# Patient Record
Sex: FEMALE | Race: WHITE | Employment: UNEMPLOYED | URBAN - METROPOLITAN AREA
[De-identification: names, ages, dates, MRNs, and addresses within clinical notes are randomized per-mention and may not be internally consistent; named-entity substitution may affect disease eponyms.]

---

## 2018-06-04 ENCOUNTER — OFFICE VISIT (OUTPATIENT)
Dept: ORTHOPEDIC SURGERY | Age: 53
End: 2018-06-04
Payer: COMMERCIAL

## 2018-06-04 VITALS — BODY MASS INDEX: 44.39 KG/M2 | HEIGHT: 64 IN | WEIGHT: 260 LBS

## 2018-06-04 DIAGNOSIS — M17.12 PRIMARY OSTEOARTHRITIS OF LEFT KNEE: Primary | ICD-10-CM

## 2018-06-04 DIAGNOSIS — M77.50 TENDONITIS OF ANKLE: ICD-10-CM

## 2018-06-04 PROCEDURE — 99204 OFFICE O/P NEW MOD 45 MIN: CPT | Performed by: ORTHOPAEDIC SURGERY

## 2018-06-04 RX ORDER — METHYLPREDNISOLONE 4 MG/1
4 TABLET ORAL SEE ADMIN INSTRUCTIONS
Qty: 1 KIT | Refills: 0 | Status: SHIPPED | OUTPATIENT
Start: 2018-06-04 | End: 2018-06-10

## 2018-06-04 RX ORDER — ATENOLOL AND CHLORTHALIDONE TABLET 50; 25 MG/1; MG/1
1 TABLET ORAL NIGHTLY
COMMUNITY
Start: 2018-05-16 | End: 2022-01-07

## 2018-06-04 RX ORDER — MELOXICAM 15 MG/1
15 TABLET ORAL DAILY
Qty: 30 TABLET | Refills: 3 | Status: SHIPPED | OUTPATIENT
Start: 2018-06-04 | End: 2019-01-25 | Stop reason: SDUPTHER

## 2018-06-04 RX ORDER — CITALOPRAM 40 MG/1
40 TABLET ORAL NIGHTLY
COMMUNITY

## 2018-06-04 RX ORDER — POTASSIUM CHLORIDE 1500 MG/1
20 TABLET, EXTENDED RELEASE ORAL DAILY
COMMUNITY
Start: 2018-03-29

## 2019-01-25 ENCOUNTER — OFFICE VISIT (OUTPATIENT)
Dept: ORTHOPEDIC SURGERY | Age: 54
End: 2019-01-25
Payer: COMMERCIAL

## 2019-01-25 VITALS — BODY MASS INDEX: 45.75 KG/M2 | HEIGHT: 64 IN | WEIGHT: 268 LBS | TEMPERATURE: 98 F

## 2019-01-25 DIAGNOSIS — M17.11 PRIMARY OSTEOARTHRITIS OF RIGHT KNEE: ICD-10-CM

## 2019-01-25 DIAGNOSIS — M17.12 PRIMARY OSTEOARTHRITIS OF LEFT KNEE: Primary | ICD-10-CM

## 2019-01-25 PROCEDURE — 20610 DRAIN/INJ JOINT/BURSA W/O US: CPT | Performed by: NURSE PRACTITIONER

## 2019-01-25 PROCEDURE — 99213 OFFICE O/P EST LOW 20 MIN: CPT | Performed by: NURSE PRACTITIONER

## 2019-01-25 RX ORDER — MELOXICAM 15 MG/1
15 TABLET ORAL DAILY
Qty: 30 TABLET | Refills: 3 | Status: SHIPPED | OUTPATIENT
Start: 2019-01-25 | End: 2019-11-01 | Stop reason: SDUPTHER

## 2019-04-18 ENCOUNTER — HOSPITAL ENCOUNTER (OUTPATIENT)
Dept: MAMMOGRAPHY | Age: 54
Discharge: HOME | End: 2019-04-20
Payer: COMMERCIAL

## 2019-04-18 DIAGNOSIS — Z12.39 BREAST SCREENING: ICD-10-CM

## 2019-04-18 PROCEDURE — 77067 SCR MAMMO BI INCL CAD: CPT

## 2019-07-24 ENCOUNTER — OFFICE VISIT (OUTPATIENT)
Dept: ORTHOPEDIC SURGERY | Age: 54
End: 2019-07-24
Payer: COMMERCIAL

## 2019-07-24 VITALS — BODY MASS INDEX: 44.39 KG/M2 | WEIGHT: 260 LBS | HEIGHT: 64 IN | TEMPERATURE: 98 F

## 2019-07-24 DIAGNOSIS — M17.11 PRIMARY OSTEOARTHRITIS OF RIGHT KNEE: ICD-10-CM

## 2019-07-24 DIAGNOSIS — M17.12 PRIMARY OSTEOARTHRITIS OF LEFT KNEE: Primary | ICD-10-CM

## 2019-07-24 PROCEDURE — 99213 OFFICE O/P EST LOW 20 MIN: CPT | Performed by: NURSE PRACTITIONER

## 2019-08-22 ENCOUNTER — OFFICE VISIT (OUTPATIENT)
Dept: ORTHOPEDIC SURGERY | Age: 54
End: 2019-08-22
Payer: COMMERCIAL

## 2019-08-22 VITALS — HEIGHT: 64 IN | WEIGHT: 259 LBS | BODY MASS INDEX: 44.22 KG/M2

## 2019-08-22 DIAGNOSIS — M17.11 PRIMARY OSTEOARTHRITIS OF RIGHT KNEE: ICD-10-CM

## 2019-08-22 DIAGNOSIS — M17.12 PRIMARY OSTEOARTHRITIS OF LEFT KNEE: Primary | ICD-10-CM

## 2019-08-22 PROCEDURE — 99213 OFFICE O/P EST LOW 20 MIN: CPT | Performed by: ORTHOPAEDIC SURGERY

## 2019-08-22 PROCEDURE — 20610 DRAIN/INJ JOINT/BURSA W/O US: CPT | Performed by: ORTHOPAEDIC SURGERY

## 2019-08-22 RX ORDER — TRIAMCINOLONE ACETONIDE 40 MG/ML
40 INJECTION, SUSPENSION INTRA-ARTICULAR; INTRAMUSCULAR ONCE
Status: COMPLETED | OUTPATIENT
Start: 2019-08-22 | End: 2019-08-22

## 2019-08-22 RX ADMIN — TRIAMCINOLONE ACETONIDE 40 MG: 40 INJECTION, SUSPENSION INTRA-ARTICULAR; INTRAMUSCULAR at 12:15

## 2019-08-22 NOTE — PROGRESS NOTES
organizations: Not on file     Relationship status: Not on file    Intimate partner violence:     Fear of current or ex partner: Not on file     Emotionally abused: Not on file     Physically abused: Not on file     Forced sexual activity: Not on file   Other Topics Concern    Not on file   Social History Narrative    Not on file     No family history on file. REVIEW OF SYSTEMS:     General/Constitution:  (-)weight loss, (-)fever, (-)chills, (-)weakness. Skin: (-) rash,(-) psoriasis,(-) eczema, (-)skin cancer. Musculoskeletal: (-) fractures,  (-) dislocations,(-) collagen vascular disease, (-) fibromyalgia, (-) multiple sclerosis, (-) muscular dystrophy, (-) RSD,(-) joint pain (-)swelling, (-) joint pain,swelling. Neurologic: (-) epilepsy, (-)seizures,(-) brain tumor,(-) TIA, (-)stroke, (-)headaches, (-)Parkinson disease,(-) memory loss, (-) LOC. Cardiovascular: (-) Chest pain, (-) swelling in legs/feet, (-) SOB, (-) cramping in legs/feet with walking. Respiratory: (-) SOB, (-) Coughing, (-) night sweats. GI: (-) nausea, (-) vomiting, (-) diarrhea, (-) blood in stool, (-) gastric ulcer. Psychiatric: (-) Depression, (-) Anxiety, (-) bipolar disease, (-) Alzheimer's Disease  Allergic/Immunologic: (-) allergies latex, (-) allergies metal, (-) skin sensitivity. Hematlogic: (-) anemia, (-) blood transfusion, (-) DVT/PE, (-) Clotting disorders      Subjective:    Constitution:  Ht 5' 4\" (1.626 m)   Wt 259 lb (117.5 kg)   BMI 44.46 kg/m²     Psycihatric:  The patient is alert and oriented x 3, appears to be stated age and in no distress. Respiratory:  Respiratory effort is not labored. Patient is not gasping. Palpation of the chest reveals no tactile fremitus. Skin:  Upon inspection: the skin appears warm, dry and intact. There is  a previous scar over the affected area. There is any cellulitis, lymphedema or cutaneous lesions noted in the lower extremities.    Upon palpation there is no Knee:  Lachman's negative, Anterior Drawer negative, Posterior Drawer negative  Tigre's positive, Thallasy  positive,   PF grind test positive, Apprehension test negative,  Patellar J sign  Negative      Xray Exam:  Medial joint space narrowing with osteophyte formation     Radiographic findings reviewed with patient    Assessment:  Encounter Diagnoses   Name Primary?  Primary osteoarthritis of left knee Yes    Primary osteoarthritis of right knee      Plan:  Natural history and expected course discussed. Questions answered. Educational materials distributed. Rest, ice, compression, and elevation (RICE) therapy. Reduction in offending activity. I had a lengthy discussion with the patient regarding their diagnosis. I explained treatment options including surgical vs non surgical treatment. I reviewed in detail the risks and benefits and outlined the procedure in detail with expected outcomes and possible complications. I also discussed non surgical treatment such as injections (CSI and visco supplementation), physical therapy, topical creams and NSAID's. They have elected for conservative management at this time. I will proceed with a cortisone injection in the Bilateral knees. Verbal and written consent was obtained for the injections. Skin was prepped with alcohol. 1 ml of Kenalog 40mg and 9 ml of 0.25% Marcaine was  injected to Bilateral knees. The injections were given through the lateral side of the knees. The patient tolerated the injections well. I will see the patient back prn   The patient has failed conservative measures such as NSAIDS, HEP, and cortisone injections. She is an excellent candidate for Zilretta injections  in the Bilateral knee.  We will contact the patient's insurance company and see them back in the office once we have received approval.

## 2019-11-01 ENCOUNTER — OFFICE VISIT (OUTPATIENT)
Dept: ORTHOPEDIC SURGERY | Age: 54
End: 2019-11-01
Payer: COMMERCIAL

## 2019-11-01 VITALS — WEIGHT: 270 LBS | BODY MASS INDEX: 46.35 KG/M2

## 2019-11-01 DIAGNOSIS — M17.12 PRIMARY OSTEOARTHRITIS OF LEFT KNEE: Primary | ICD-10-CM

## 2019-11-01 PROCEDURE — 99213 OFFICE O/P EST LOW 20 MIN: CPT | Performed by: NURSE PRACTITIONER

## 2019-11-01 RX ORDER — MELOXICAM 15 MG/1
15 TABLET ORAL DAILY
Qty: 90 TABLET | Refills: 3 | Status: SHIPPED
Start: 2019-11-01 | End: 2020-03-05 | Stop reason: ALTCHOICE

## 2019-12-03 ENCOUNTER — OFFICE VISIT (OUTPATIENT)
Dept: ORTHOPEDIC SURGERY | Age: 54
End: 2019-12-03
Payer: COMMERCIAL

## 2019-12-03 VITALS — BODY MASS INDEX: 46.1 KG/M2 | HEIGHT: 64 IN | WEIGHT: 270 LBS | TEMPERATURE: 98 F

## 2019-12-03 DIAGNOSIS — M17.11 PRIMARY OSTEOARTHRITIS OF RIGHT KNEE: ICD-10-CM

## 2019-12-03 DIAGNOSIS — M17.12 PRIMARY OSTEOARTHRITIS OF LEFT KNEE: Primary | ICD-10-CM

## 2019-12-03 PROCEDURE — 99214 OFFICE O/P EST MOD 30 MIN: CPT | Performed by: ORTHOPAEDIC SURGERY

## 2019-12-03 PROCEDURE — 20610 DRAIN/INJ JOINT/BURSA W/O US: CPT | Performed by: ORTHOPAEDIC SURGERY

## 2019-12-04 DIAGNOSIS — M17.12 PRIMARY OSTEOARTHRITIS OF LEFT KNEE: Primary | ICD-10-CM

## 2019-12-04 DIAGNOSIS — M17.11 PRIMARY OSTEOARTHRITIS OF RIGHT KNEE: ICD-10-CM

## 2019-12-29 RX ORDER — TRIAMCINOLONE ACETONIDE 40 MG/ML
40 INJECTION, SUSPENSION INTRA-ARTICULAR; INTRAMUSCULAR ONCE
Status: COMPLETED | OUTPATIENT
Start: 2019-12-29 | End: 2019-12-29

## 2019-12-29 RX ADMIN — TRIAMCINOLONE ACETONIDE 40 MG: 40 INJECTION, SUSPENSION INTRA-ARTICULAR; INTRAMUSCULAR at 20:15

## 2020-03-05 ENCOUNTER — OFFICE VISIT (OUTPATIENT)
Dept: ORTHOPEDIC SURGERY | Age: 55
End: 2020-03-05
Payer: COMMERCIAL

## 2020-03-05 VITALS — BODY MASS INDEX: 46.1 KG/M2 | WEIGHT: 270 LBS | HEIGHT: 64 IN

## 2020-03-05 PROCEDURE — 99213 OFFICE O/P EST LOW 20 MIN: CPT | Performed by: ORTHOPAEDIC SURGERY

## 2020-03-05 PROCEDURE — 20610 DRAIN/INJ JOINT/BURSA W/O US: CPT | Performed by: ORTHOPAEDIC SURGERY

## 2020-03-05 RX ORDER — TRIAMCINOLONE ACETONIDE 40 MG/ML
40 INJECTION, SUSPENSION INTRA-ARTICULAR; INTRAMUSCULAR ONCE
Status: COMPLETED | OUTPATIENT
Start: 2020-03-05 | End: 2020-03-05

## 2020-03-05 RX ADMIN — TRIAMCINOLONE ACETONIDE 40 MG: 40 INJECTION, SUSPENSION INTRA-ARTICULAR; INTRAMUSCULAR at 11:31

## 2020-03-05 NOTE — PROGRESS NOTES
Chief Complaint   Patient presents with    Follow-up     follow up from bialteral knee pain, patient last injection was 12/3/19, she states the injection last only a month. Patient is doing home exercise program       Subjective:     Patient ID: Tala Snowden is a 48 y.o..  female    Knee Pain    Patient complains of left knee pain. She has failed the following therapies: weight loss, physical therapy including aqua therapy, Mobic, cane, brace And cortisone injections with short term effects. Snd has only had relief for 6 weeks with regular cotisone injections. The current insurance does not approve visco supplementation.      Past Medical History:   Diagnosis Date    Depression     HTN (hypertension)      Past Surgical History:   Procedure Laterality Date    CHOLECYSTECTOMY      DILATION AND CURETTAGE OF UTERUS      TONSILLECTOMY         Current Outpatient Medications:     Elastic Bandages & Supports (KNEE BRACE ADJUSTABLE HINGED) MISC, Wear brace when active, Disp: 1 each, Rfl: 0    KLOR-CON M20 20 MEQ extended release tablet, , Disp: , Rfl:     atenolol-chlorthalidone (TENORETIC) 50-25 MG per tablet, , Disp: , Rfl:     citalopram (CELEXA) 40 MG tablet, Take 40 mg by mouth daily, Disp: , Rfl:   Allergies   Allergen Reactions    Ace Inhibitors Other (See Comments)    Pcn [Penicillins]      Social History     Socioeconomic History    Marital status:      Spouse name: Not on file    Number of children: Not on file    Years of education: Not on file    Highest education level: Not on file   Occupational History    Not on file   Social Needs    Financial resource strain: Not on file    Food insecurity:     Worry: Not on file     Inability: Not on file    Transportation needs:     Medical: Not on file     Non-medical: Not on file   Tobacco Use    Smoking status: Never Smoker    Smokeless tobacco: Never Used   Substance and Sexual Activity    Alcohol use: No    Drug use: Never LMP  (Approximate) Comment: 2-3 months ago  BMI 46.35 kg/m²     Psycihatric:  The patient is alert and oriented x 3, appears to be stated age and in no distress. Respiratory:  Respiratory effort is not labored. Patient is not gasping. Palpation of the chest reveals no tactile fremitus. Skin:  Upon inspection: the skin appears warm, dry and intact. There is  a previous scar over the affected area. There is any cellulitis, lymphedema or cutaneous lesions noted in the lower extremities. Upon palpation there is no induration noted. Neurologic:  Gait: normal;  Motor exam of the lower extremities show ; quadriceps, hamstrings, foot dorsi and plantar flexors intact R.  5/5 and L. 5/5. Deep tendon reflexes are 2/4 at the knees and 2/4 at the ankles with strong extensor hallicus longus motor strength bilaterally. Sensory to both feet is intact to all sensory roots. Cardiovascular: The vascular exam is normal and is well perfused to distal extremities. Distal pulses DP/PT: R. 2+; L. 2+. There is cap refill noted less than two seconds in all digits. There is not edema of the bilateral lower extremities. There is not varicosities noted in the distal extremities. Lymph:  Upon palpation,  there is no lymphadenopathy noted in bilateral lower extremities. Musculoskeletal:  Gait: normal; examination of the nails and digits reveal no cyanosis or clubbing. Lumbar exam:  On visual inspection, there is not deformity of the spine. full range of motion, no tenderness, palpable spasm or pain on motion. Special tests: Straight Leg Raise negative, Rubén test negative. Hip exam:   Upon inspection, there is not deformity noted. Upon palpation there is not tenderness. ROM: is  full and symmetrical.   Strength: Hip Flexors 5/5; Hip Abductors 5/5; Hip Adduction 5/5. Knee exam:  Left knee exam shows;  range of motion of R. Knee is 0 to 115, and L. Knee is 0 to 110.  The patient does have  pain on lateral side of the knees. The patient tolerated the injections well.  I will see the patient back prn

## 2020-06-25 ENCOUNTER — OFFICE VISIT (OUTPATIENT)
Dept: ORTHOPEDIC SURGERY | Age: 55
End: 2020-06-25
Payer: COMMERCIAL

## 2020-06-25 VITALS — HEIGHT: 64 IN | WEIGHT: 270 LBS | BODY MASS INDEX: 46.1 KG/M2 | TEMPERATURE: 98 F

## 2020-06-25 PROCEDURE — 99213 OFFICE O/P EST LOW 20 MIN: CPT | Performed by: NURSE PRACTITIONER

## 2020-06-25 RX ORDER — CELECOXIB 200 MG/1
200 CAPSULE ORAL DAILY
Qty: 30 CAPSULE | Refills: 3 | Status: SHIPPED
Start: 2020-06-25 | End: 2021-05-21 | Stop reason: ALTCHOICE

## 2020-06-25 NOTE — PROGRESS NOTES
Physical activity     Days per week: Not on file     Minutes per session: Not on file    Stress: Not on file   Relationships    Social connections     Talks on phone: Not on file     Gets together: Not on file     Attends Jewish service: Not on file     Active member of club or organization: Not on file     Attends meetings of clubs or organizations: Not on file     Relationship status: Not on file    Intimate partner violence     Fear of current or ex partner: Not on file     Emotionally abused: Not on file     Physically abused: Not on file     Forced sexual activity: Not on file   Other Topics Concern    Not on file   Social History Narrative    Not on file     Family History   Problem Relation Age of Onset    Diabetes Mother     Hypertension Mother     High Cholesterol Father          REVIEW OF SYSTEMS:     General/Constitution:  (-)weight loss, (-)fever, (-)chills, (-)weakness. Skin: (-) rash,(-) psoriasis,(-) eczema, (-)skin cancer. Musculoskeletal: (-) fractures,  (-) dislocations,(-) collagen vascular disease, (-) fibromyalgia, (-) multiple sclerosis, (-) muscular dystrophy, (-) RSD,(-) joint pain (-)swelling, (-) joint pain,swelling. Neurologic: (-) epilepsy, (-)seizures,(-) brain tumor,(-) TIA, (-)stroke, (-)headaches, (-)Parkinson disease,(-) memory loss, (-) LOC. Cardiovascular: (-) Chest pain, (-) swelling in legs/feet, (-) SOB, (-) cramping in legs/feet with walking. Respiratory: (-) SOB, (-) Coughing, (-) night sweats. GI: (-) nausea, (-) vomiting, (-) diarrhea, (-) blood in stool, (-) gastric ulcer. Psychiatric: (-) Depression, (-) Anxiety, (-) bipolar disease, (-) Alzheimer's Disease  Allergic/Immunologic: (-) allergies latex, (-) allergies metal, (-) skin sensitivity.   Hematlogic: (-) anemia, (-) blood transfusion, (-) DVT/PE, (-) Clotting disorders      Subjective:    Constitution:  Temp 98 °F (36.7 °C)   Ht 5' 4\" (1.626 m)   Wt 270 lb (122.5 kg)   BMI 46.35 kg/m² Psycihatric:  The patient is alert and oriented x 3, appears to be stated age and in no distress. Respiratory:  Respiratory effort is not labored. Patient is not gasping. Palpation of the chest reveals no tactile fremitus. Skin:  Upon inspection: the skin appears warm, dry and intact. There is  a previous scar over the affected area. There is any cellulitis, lymphedema or cutaneous lesions noted in the lower extremities. Upon palpation there is no induration noted. Neurologic:  Gait: normal;  Motor exam of the lower extremities show ; quadriceps, hamstrings, foot dorsi and plantar flexors intact R.  5/5 and L. 5/5. Deep tendon reflexes are 2/4 at the knees and 2/4 at the ankles with strong extensor hallicus longus motor strength bilaterally. Sensory to both feet is intact to all sensory roots. Cardiovascular: The vascular exam is normal and is well perfused to distal extremities. Distal pulses DP/PT: R. 2+; L. 2+. There is cap refill noted less than two seconds in all digits. There is not edema of the bilateral lower extremities. There is not varicosities noted in the distal extremities. Lymph:  Upon palpation,  there is no lymphadenopathy noted in bilateral lower extremities. Musculoskeletal:  Gait: normal; examination of the nails and digits reveal no cyanosis or clubbing. Lumbar exam:  On visual inspection, there is not deformity of the spine. full range of motion, no tenderness, palpable spasm or pain on motion. Special tests: Straight Leg Raise negative, Rubén test negative. Hip exam:   Upon inspection, there is not deformity noted. Upon palpation there is not tenderness. ROM: is  full and symmetrical.   Strength: Hip Flexors 5/5; Hip Abductors 5/5; Hip Adduction 5/5. Knee exam:  Left knee exam shows;  range of motion of R. Knee is 0 to 115, and L. Knee is 0 to 110.  The patient does have  pain on motion, effusion is mild, there is tenderness over the  medial region, there are not any masses, there is ligamentous instability, there is  Varus deformity noted. Knee exam: left positive for moderate crepitations, some mild tenderness laxity is noted with valgus stress. There is not a popliteal cyst.    R. Knee:  Lachman's negative, Anterior Drawer negative, Posterior Drawer negative  Tigre's negative, Thallasy  negative,   PF grind test negative, Apprehension test negative, Patellar J sign  negative  L. Knee:  Lachman's negative, Anterior Drawer negative, Posterior Drawer negative  Tigre's positive, Thallasy  positive,   PF grind test positive, Apprehension test negative,  Patellar J sign  Negative      Xray Exam:  Right knee: Right knee demonstrates medial compartment grade 2/3   osteoarthritis changes with moderate joint space loss, small marginal   osteophytes, and no subchondral cystic change.  Findings have progressed   compared to January 2019.  Mild genu varus.  The lateral joint space is   maintained.  Mild patellofemoral osteoarthritis (grade 1/2).  No joint   effusion.       Left knee: Grade 4 medial compartment osteoarthritis with bone on bone and   genu varus.           Radiographic findings reviewed with patient    Assessment:  Encounter Diagnoses   Name Primary?  Primary osteoarthritis of left knee Yes    Primary osteoarthritis of right knee      Plan:    I had a lengthy discussion with the patient regarding their diagnosis. I explained treatment options including surgical vs non surgical treatment. I reviewed in detail the risks and benefits and outlined the procedure in detail with expected outcomes and possible complications. I also discussed non surgical treatment such as injections (CSI and visco supplementation), physical therapy, topical creams and NSAID's. They have elected for conservative management at this time. The patient has failed conservative measures such as NSAIDS, HEP, PT and cortisone injections, weight loss .   She is an excellent candidate for Zilretta injections  in the bilateral knee.  We will contact the patient's insurance company and see them back in the office once we have received approval.  celebrex

## 2020-07-16 ENCOUNTER — TELEPHONE (OUTPATIENT)
Dept: ORTHOPEDIC SURGERY | Age: 55
End: 2020-07-16

## 2020-07-16 NOTE — TELEPHONE ENCOUNTER
Left a message on voicemail, in regards to samples of Zilretta injections, and to call the office to schedule with Sai or .

## 2020-07-20 ENCOUNTER — NURSE ONLY (OUTPATIENT)
Dept: ORTHOPEDIC SURGERY | Age: 55
End: 2020-07-20
Payer: COMMERCIAL

## 2020-07-20 PROCEDURE — 20610 DRAIN/INJ JOINT/BURSA W/O US: CPT | Performed by: NURSE PRACTITIONER

## 2020-11-24 ENCOUNTER — OFFICE VISIT (OUTPATIENT)
Dept: ORTHOPEDIC SURGERY | Age: 55
End: 2020-11-24
Payer: COMMERCIAL

## 2020-11-24 VITALS — WEIGHT: 270 LBS | HEIGHT: 64 IN | BODY MASS INDEX: 46.1 KG/M2

## 2020-11-24 PROCEDURE — 99213 OFFICE O/P EST LOW 20 MIN: CPT | Performed by: ORTHOPAEDIC SURGERY

## 2020-11-24 NOTE — PROGRESS NOTES
Chief Complaint   Patient presents with    Knee Pain     Bilateral knee follow up. She did have some relief from zilretta. using cane and braces. Risa Benitez returns today for follow-up of her bilateral knee pain R>L. she reports this is worse than when I saw her last.  The patient's pain level is a 8/10. The previous treatment was not successful.     Past Medical History:   Diagnosis Date    Depression     HTN (hypertension)      Past Surgical History:   Procedure Laterality Date    CHOLECYSTECTOMY      DILATION AND CURETTAGE OF UTERUS      TONSILLECTOMY         Current Outpatient Medications:     celecoxib (CELEBREX) 200 MG capsule, Take 1 capsule by mouth daily, Disp: 30 capsule, Rfl: 3    Elastic Bandages & Supports (KNEE BRACE ADJUSTABLE HINGED) MISC, Wear brace when active, Disp: 1 each, Rfl: 0    KLOR-CON M20 20 MEQ extended release tablet, , Disp: , Rfl:     atenolol-chlorthalidone (TENORETIC) 50-25 MG per tablet, , Disp: , Rfl:     citalopram (CELEXA) 40 MG tablet, Take 40 mg by mouth daily, Disp: , Rfl:   Allergies   Allergen Reactions    Ace Inhibitors Other (See Comments)    Pcn [Penicillins]      Social History     Socioeconomic History    Marital status:      Spouse name: Not on file    Number of children: Not on file    Years of education: Not on file    Highest education level: Not on file   Occupational History    Not on file   Social Needs    Financial resource strain: Not on file    Food insecurity     Worry: Not on file     Inability: Not on file    Transportation needs     Medical: Not on file     Non-medical: Not on file   Tobacco Use    Smoking status: Never Smoker    Smokeless tobacco: Never Used   Substance and Sexual Activity    Alcohol use: No    Drug use: Never    Sexual activity: Not on file   Lifestyle    Physical activity     Days per week: Not on file     Minutes per session: Not on file    Stress: Not on file   Relationships    Social connections     Talks on phone: Not on file     Gets together: Not on file     Attends Rastafarian service: Not on file     Active member of club or organization: Not on file     Attends meetings of clubs or organizations: Not on file     Relationship status: Not on file    Intimate partner violence     Fear of current or ex partner: Not on file     Emotionally abused: Not on file     Physically abused: Not on file     Forced sexual activity: Not on file   Other Topics Concern    Not on file   Social History Narrative    Not on file     Family History   Problem Relation Age of Onset    Diabetes Mother     Hypertension Mother     High Cholesterol Father        Review of Systems:     Skin: (-) rash,(-) psoriasis,(-) eczema, (-)skin cancer. Musculoskeletal: (-) fractures,  (-) dislocations,(-) collagen vascular disease, (-) fibromyalgia, (-) multiple sclerosis, (-) muscular dystrophy, (-) RSD,(-) joint pain (-)swelling, (-) joint pain,swelling. Neurologic: (-) epilepsy, (-)seizures,(-) brain tumor,(-) TIA, (-)stroke, (-)headaches, (-)Parkinson disease,(-) memory loss, (-) LOC. Cardiovascular: (-) Chest pain, (-) swelling in legs/feet, (-) SOB, (-) cramping in legs/feet with walking. Constitutional:  The patient is alert and oriented x 3, appears to be stated age and in no distress. Ht 5' 4\" (1.626 m)   Wt 270 lb (122.5 kg)   BMI 46.35 kg/m²     Skin:  Upon inspection: the skin appears warm, dry and intact. There is not a previous scar over the affected area. There is not any cellulitis, lymphedema or cutaneous lesions noted in the lower extremities. Upon palpation there is no induration noted. Neurologic:  Gait: normal;  Motor exam of the lower extremities show ; quadriceps, hamstrings, foot dorsi and plantar flexors intact R.  5/5 and L. 5/5. Deep tendon reflexes are 2/4 at the knees and 2/4 at the ankles with strong extensor hallicus longus motor strength bilaterally.  Sensory to both feet is intact to all sensory roots. Cardiovascular: The vascular exam is normal and is well perfused to distal extremities. Distal pulses DP/PT: R. 2+; L. 2+. There is cap refill noted less than two seconds in all digits. There is not edema of the bilateral lower extremities. There is not varicosities noted in the distal extremities. Lymph:  Upon palpation,  there is no lymphadenopathy noted in bilateral lower extremities. Musculoskeletal:  Gait: antalgic; examination of the nails and digits reveal no cyanosis or clubbing    Lumbar exam:  On visual inspection, there is no deformity of the spine. full range of motion, no tenderness, palpable spasm or pain on motion. Special tests: Straight Leg Raise negative, Rubén testnegative. Hip exam:  Upon inspection, there is no deformity noted. Upon palpation there is not tenderness. ROM: is   full and semetrical.   Strength: Hip Flexors 5/5; Hip Abductors 5/5; Hip Adduction 5/5. Knee exam:  Bilateral knee exam shows;  range of motion of R. Knee is 0 to 110, and L. Knee is 0 to 110. She does have  pain on motion, effusion is mild, there is tenderness over the  medial region, there are not any masses, there is ligamentous instability, there is  Varus deformity noted. Knee exam: bilateral positive for moderate crepitations, some mild tenderness laxity is not noted with  stress. R. Knee:  Lachman's negative, Anterior Drawer negative, Posterior Drawer negative  Tigre's negative, Thallasy  negative,   PF grind test positive, Apprehension test negative, Patellar J sign  negative  L. Knee:  Lachman's negative, Anterior Drawer negative, Posterior Drawer negative  Tigre's negative, Thallasy  negative,   PF grind test positive, Apprehension test negative,  Patellar J sign  negative    Xrays:   1.  Severe osteoarthritis involving the left knee, with a medial compartment    predominance, increased in severity since the 01/25/2019 exam.        Right knee demonstrates grade 2/3 osteoarthritis in the medial compartment. Grade 1/2 patellofemoral osteoarthritis.         Left knee grade 4 osteoarthritis in the medial compartment. MRI:   n/a  Radiographic findings reviewed with patient    Impression:  Encounter Diagnoses   Name Primary?  Primary osteoarthritis of right knee Yes    Primary osteoarthritis of left knee        Plan:   Natural history and expected course discussed. Questions answered. Educational materials distributed. Rest, ice, compression, and elevation (RICE) therapy. Reduction in offending activity. I had a lengthy discussion with the patient regarding their diagnosis. I explained treatment options including surgical vs non surgical treatment. I reviewed in detail the risks and benefits and outlined the procedure in detail with expected outcomes and possible complications. I also discussed non surgical treatment such as injections (CSI and visco supplementation), physical therapy, topical creams and NSAID's. They have elected for conservative  management at this time. The patient has failed conservative measures such as NSAIDS, HEP, and cortisone injections. She is an excellent candidate for viscous supplementation injections including euflexxa in the Bilateral knee.    We will contact the patient's insurance company and see them back in the office once we have received approval.

## 2020-12-16 ENCOUNTER — NURSE ONLY (OUTPATIENT)
Dept: ORTHOPEDIC SURGERY | Age: 55
End: 2020-12-16
Payer: COMMERCIAL

## 2020-12-16 PROCEDURE — 20610 DRAIN/INJ JOINT/BURSA W/O US: CPT | Performed by: NURSE PRACTITIONER

## 2020-12-16 NOTE — PROGRESS NOTES
Chief Complaint   Patient presents with    Injections     b/l sinvisc #1       Verbal and written consent was obtained by the patient. The following is a well known patient to me that is here synvisc number 1 injection. Her knee was prepped in a sterile fashion. synvisc 16mg was injected into the Bilateral lateral knee . The patient tolerated the injection well. I will see the patient back in 1 weeks time for repeat injection. There are no diagnoses linked to this encounter.

## 2020-12-22 ENCOUNTER — OFFICE VISIT (OUTPATIENT)
Dept: ORTHOPEDIC SURGERY | Age: 55
End: 2020-12-22
Payer: COMMERCIAL

## 2020-12-22 PROCEDURE — 20610 DRAIN/INJ JOINT/BURSA W/O US: CPT | Performed by: ORTHOPAEDIC SURGERY

## 2020-12-23 NOTE — PROGRESS NOTES
Chief Complaint   Patient presents with    Knee Pain     Bilateral Knee Synvisc #2       Verbal and written consent was obtained by the patient. The following is a well known to me that is here for bilateral knee injections. They are here for  Synvisc  # 2. Her knees were prepped in sterile fashion. Synvisc 16mg injected to Bilateral knees. The patient tolerated the injections well and I will see the patient back in 1 week for repeat injections. There are no diagnoses linked to this encounter.

## 2020-12-29 ENCOUNTER — OFFICE VISIT (OUTPATIENT)
Dept: ORTHOPEDIC SURGERY | Age: 55
End: 2020-12-29
Payer: COMMERCIAL

## 2020-12-29 PROCEDURE — 20610 DRAIN/INJ JOINT/BURSA W/O US: CPT | Performed by: ORTHOPAEDIC SURGERY

## 2020-12-30 NOTE — PROGRESS NOTES
Chief Complaint   Patient presents with    Knee Pain     Bilateral Knee Synvisc #3       Verbal and written consent was obtained by the patient. The following is a well known to me that is here for bilateral knee injections. They are here for  Synvisc  # 3. Her knees were prepped in sterile fashion. Synvisc 16mg injected to Bilateral knees. The patient tolerated the injections well and I will see the patient back in 1 week for repeat injections. Roly Carrillo was seen today for knee pain.     Diagnoses and all orders for this visit:    Primary osteoarthritis of right knee  -     [place] ARTHROCENTESIS ASPIR&/INJ MAJOR JT/BURSA W/O US  -     Hylan injection 16 mg    Primary osteoarthritis of left knee  -     [place] ARTHROCENTESIS ASPIR&/INJ MAJOR JT/BURSA W/O US  -     Hylan injection 16 mg

## 2021-04-12 ENCOUNTER — OFFICE VISIT (OUTPATIENT)
Dept: ORTHOPEDIC SURGERY | Age: 56
End: 2021-04-12
Payer: COMMERCIAL

## 2021-04-12 VITALS — TEMPERATURE: 98 F | HEIGHT: 64 IN | BODY MASS INDEX: 42.34 KG/M2 | WEIGHT: 248 LBS

## 2021-04-12 DIAGNOSIS — M17.11 PRIMARY OSTEOARTHRITIS OF RIGHT KNEE: Primary | ICD-10-CM

## 2021-04-12 DIAGNOSIS — M17.12 PRIMARY OSTEOARTHRITIS OF LEFT KNEE: ICD-10-CM

## 2021-04-12 PROCEDURE — 99214 OFFICE O/P EST MOD 30 MIN: CPT | Performed by: ORTHOPAEDIC SURGERY

## 2021-04-12 NOTE — PROGRESS NOTES
Chief Complaint   Patient presents with    Knee Pain     Bilatearl Knee F/U, finished Synvisc on 12/29/2020 with minimal relief, has tried PT and cortisone injections with minimal relief. Александр Gillette returns today for follow-up of her bilateral knee pain. she reports this is worse than when I saw her last.  The patient's pain level is a 8-9/10. The previous treatment was not successful.     Past Medical History:   Diagnosis Date    Depression     HTN (hypertension)      Past Surgical History:   Procedure Laterality Date    CHOLECYSTECTOMY      DILATION AND CURETTAGE OF UTERUS      TONSILLECTOMY         Current Outpatient Medications:     Elastic Bandages & Supports (KNEE BRACE ADJUSTABLE HINGED) MISC, Wear brace when active, Disp: 1 each, Rfl: 0    KLOR-CON M20 20 MEQ extended release tablet, , Disp: , Rfl:     atenolol-chlorthalidone (TENORETIC) 50-25 MG per tablet, , Disp: , Rfl:     citalopram (CELEXA) 40 MG tablet, Take 40 mg by mouth daily, Disp: , Rfl:     celecoxib (CELEBREX) 200 MG capsule, Take 1 capsule by mouth daily, Disp: 30 capsule, Rfl: 3  Allergies   Allergen Reactions    Ace Inhibitors Other (See Comments)    Pcn [Penicillins]      Social History     Socioeconomic History    Marital status:      Spouse name: Not on file    Number of children: Not on file    Years of education: Not on file    Highest education level: Not on file   Occupational History    Not on file   Social Needs    Financial resource strain: Not on file    Food insecurity     Worry: Not on file     Inability: Not on file    Transportation needs     Medical: Not on file     Non-medical: Not on file   Tobacco Use    Smoking status: Never Smoker    Smokeless tobacco: Never Used   Substance and Sexual Activity    Alcohol use: No    Drug use: Never    Sexual activity: Not on file   Lifestyle    Physical activity     Days per week: Not on file     Minutes per session: Not on file    Stress: Not and in no distress. Temp 98 °F (36.7 °C)   Ht 5' 4\" (1.626 m)   Wt 248 lb (112.5 kg)   BMI 42.57 kg/m²     Skin:  Upon inspection: the skin appears warm, dry and intact. There is not a previous scar over the affected area. There is not any cellulitis, lymphedema or cutaneous lesions noted in the lower extremities. Upon palpation there is no induration noted. Neurologic:  Gait: normal;  Motor exam of the lower extremities show ; quadriceps, hamstrings, foot dorsi and plantar flexors intact R.  5/5 and L. 5/5. Deep tendon reflexes are 2/4 at the knees and 2/4 at the ankles with strong extensor hallicus longus motor strength bilaterally. Sensory to both feet is intact to all sensory roots. Cardiovascular: The vascular exam is normal and is well perfused to distal extremities. Distal pulses DP/PT: R. 2+; L. 2+. There is cap refill noted less than two seconds in all digits. There is not edema of the bilateral lower extremities. There is not varicosities noted in the distal extremities. Lymph:  Upon palpation,  there is no lymphadenopathy noted in bilateral lower extremities. Musculoskeletal:  Gait: antalgic; examination of the nails and digits reveal no cyanosis or clubbing    Lumbar exam:  On visual inspection, there is no deformity of the spine. full range of motion, no tenderness, palpable spasm or pain on motion. Special tests: Straight Leg Raise negative, Rubén testnegative. Hip exam:  Upon inspection, there is no deformity noted. Upon palpation there is not tenderness. ROM: is   full and semetrical.   Strength: Hip Flexors 5/5; Hip Abductors 5/5; Hip Adduction 5/5. Knee exam:  Bilateral knee exam shows;  range of motion of R. Knee is 5 to 115, and L. Knee is 5 to 115. She does have  pain on motion, effusion is mild, there is tenderness over the  medial region, there are not any masses, there is not ligamentous instability, there is  Varus deformity noted.  Knee exam: bilateral positive for moderate crepitations, some mild tenderness laxity is not noted with  stress. R. Knee:  Lachman's negative, Anterior Drawer negative, Posterior Drawer negative  Tigre's positive, Thallasy  positive,   PF grind test positive, Apprehension test negative, Patellar J sign  negative  L. Knee:  Lachman's negative, Anterior Drawer negative, Posterior Drawer negative  Tigre's positive, Thallasy  positive,   PF grind test positive, Apprehension test negative,  Patellar J sign  negative    Xrays:   Right knee demonstrates grade 2/3 osteoarthritis in the medial compartment. Grade 1/2 patellofemoral osteoarthritis.       Left knee grade 4 osteoarthritis in the medial compartment.             MRI:   n/a  Radiographic findings reviewed with patient    Impression:  Encounter Diagnoses   Name Primary?  Primary osteoarthritis of right knee Yes    Primary osteoarthritis of left knee        Plan:   Natural history and expected course discussed. Questions answered. Educational materials distributed. Rest, ice, compression, and elevation (RICE) therapy. Reduction in offending activity. Patellar compression sleeve. I had a lengthy discussion with the patient regarding their diagnosis. I explained treatment options including surgical vs non surgical treatment. I reviewed in detail the risks and benefits and outlined the procedure in detail with expected outcomes and possible complications. I also discussed non surgical treatment such as injections (CSI and visco supplementation), physical therapy, topical creams and NSAID's. They have elected for surgical management at this time. We discussed various treatment options both surgical and non-surgical.   The patient is unable to ambulate more than 100 feet and is unable to perform the average daily activities including:  Light housework, ADLs, donning clothes, toileting and exercise.   Patient has failed previous conservative measures including cortisone injections, NSAIDs, PT, HEP and pain medication and is currently a fall risk to the disability and decreased functioning. The patient wishes to have the total knee arthroplasty. The risks and benefits of a total knee replacement were discussed with the patient. The risks include but are not limited to: infection, injury to blood vessels and nerves, non relief of symptoms, arthrofibrosis of knee, aseptic loosening of prosthesis, intraoperative fracture, blood loss, PE/DVT, MI, dislocation of hip and knee, need for further operative intervention and death. The patient is aware of the risks and wished to proceed with a Right total knee replacement 6/2/2021. We will obtain medical clearence from the PCP. The patient was counseled at length about the risks of mayra Covid-19 during their perioperative period and any recovery window from their procedure. The patient was made aware that mayra Covid-19  may worsen their prognosis for recovering from their procedure  and lend to a higher morbidity and/or mortality risk. All material risks, benefits, and reasonable alternatives including postponing the procedure were discussed. The patient does wish to proceed with the procedure at this time. At least 30 minutes was spent discussing the diagnosis and treatment options with the patient with at least 50% of the time was spent with decision making and counseling the patient.

## 2021-05-14 ASSESSMENT — PROMIS GLOBAL HEALTH SCALE
TO WHAT EXTENT ARE YOU ABLE TO CARRY OUT YOUR EVERYDAY PHYSICAL ACTIVITIES SUCH AS WALKING, CLIMBING STAIRS, CARRYING GROCERIES, OR MOVING A CHAIR [ON A SCALE OF 1 (NOT AT ALL) TO 5 (COMPLETELY)]?: 3
IN GENERAL, WOULD YOU SAY YOUR QUALITY OF LIFE IS...[ON A SCALE OF 1 (POOR) TO 5 (EXCELLENT)]: 2
IN THE PAST 7 DAYS, HOW OFTEN HAVE YOU BEEN BOTHERED BY EMOTIONAL PROBLEMS, SUCH AS FEELING ANXIOUS, DEPRESSED, OR IRRITABLE [ON A SCALE FROM 1 (NEVER) TO 5 (ALWAYS)]?: 3
IN GENERAL, HOW WOULD YOU RATE YOUR PHYSICAL HEALTH [ON A SCALE OF 1 (POOR) TO 5 (EXCELLENT)]?: 2
WHO IS THE PERSON COMPLETING THE PROMIS V1.1 SURVEY?: 0
SUM OF RESPONSES TO QUESTIONS 2, 4, 5, & 10: 12
HOW IS THE PROMIS V1.1 BEING ADMINISTERED?: 2

## 2021-05-14 ASSESSMENT — KOOS JR
TWISING OR PIVOTING ON KNEE: 3
RISING FROM SITTING: 3
STANDING UPRIGHT: 3
STRAIGHTENING KNEE FULLY: 2

## 2021-05-21 ENCOUNTER — ANESTHESIA EVENT (OUTPATIENT)
Dept: OPERATING ROOM | Age: 56
End: 2021-05-21
Payer: COMMERCIAL

## 2021-05-21 ENCOUNTER — HOSPITAL ENCOUNTER (OUTPATIENT)
Dept: PREADMISSION TESTING | Age: 56
Discharge: HOME | End: 2021-05-21
Payer: COMMERCIAL

## 2021-05-21 ENCOUNTER — HOSPITAL ENCOUNTER (OUTPATIENT)
Dept: GENERAL RADIOLOGY | Age: 56
Discharge: HOME | End: 2021-05-23
Payer: COMMERCIAL

## 2021-05-21 VITALS
HEART RATE: 70 BPM | SYSTOLIC BLOOD PRESSURE: 110 MMHG | HEIGHT: 64 IN | BODY MASS INDEX: 41.23 KG/M2 | RESPIRATION RATE: 18 BRPM | DIASTOLIC BLOOD PRESSURE: 57 MMHG | TEMPERATURE: 98.1 F | OXYGEN SATURATION: 95 % | WEIGHT: 241.5 LBS

## 2021-05-21 DIAGNOSIS — M17.11 PRIMARY OSTEOARTHRITIS OF RIGHT KNEE: ICD-10-CM

## 2021-05-21 LAB
ABO/RH: NORMAL
ANTIBODY SCREEN: NORMAL
APTT: 30.1 SEC (ref 24.5–35.1)
INR BLD: 1
PREALBUMIN: 20 MG/DL (ref 20–40)
PROTHROMBIN TIME: 11.8 SEC (ref 9.3–12.4)

## 2021-05-21 PROCEDURE — 87088 URINE BACTERIA CULTURE: CPT

## 2021-05-21 PROCEDURE — 84134 ASSAY OF PREALBUMIN: CPT

## 2021-05-21 PROCEDURE — 86901 BLOOD TYPING SEROLOGIC RH(D): CPT

## 2021-05-21 PROCEDURE — 85610 PROTHROMBIN TIME: CPT

## 2021-05-21 PROCEDURE — 36415 COLL VENOUS BLD VENIPUNCTURE: CPT

## 2021-05-21 PROCEDURE — 86850 RBC ANTIBODY SCREEN: CPT

## 2021-05-21 PROCEDURE — 85730 THROMBOPLASTIN TIME PARTIAL: CPT

## 2021-05-21 PROCEDURE — 87081 CULTURE SCREEN ONLY: CPT

## 2021-05-21 PROCEDURE — 71046 X-RAY EXAM CHEST 2 VIEWS: CPT

## 2021-05-21 PROCEDURE — 86900 BLOOD TYPING SEROLOGIC ABO: CPT

## 2021-05-21 RX ORDER — CETIRIZINE HYDROCHLORIDE 10 MG/1
10 TABLET ORAL DAILY PRN
COMMUNITY

## 2021-05-21 RX ORDER — SODIUM CHLORIDE, SODIUM LACTATE, POTASSIUM CHLORIDE, CALCIUM CHLORIDE 600; 310; 30; 20 MG/100ML; MG/100ML; MG/100ML; MG/100ML
INJECTION, SOLUTION INTRAVENOUS CONTINUOUS
Status: CANCELLED | OUTPATIENT
Start: 2021-05-21

## 2021-05-21 RX ORDER — MIDAZOLAM HYDROCHLORIDE 1 MG/ML
1 INJECTION INTRAMUSCULAR; INTRAVENOUS PRN
Status: CANCELLED | OUTPATIENT
Start: 2021-05-21

## 2021-05-21 RX ORDER — ROPIVACAINE HYDROCHLORIDE 5 MG/ML
40 INJECTION, SOLUTION EPIDURAL; INFILTRATION; PERINEURAL
Status: CANCELLED | OUTPATIENT
Start: 2021-05-21 | End: 2021-05-21

## 2021-05-21 RX ORDER — FENTANYL CITRATE 50 UG/ML
50 INJECTION, SOLUTION INTRAMUSCULAR; INTRAVENOUS EVERY 10 MIN PRN
Status: DISCONTINUED | OUTPATIENT
Start: 2021-05-21 | End: 2021-05-22 | Stop reason: HOSPADM

## 2021-05-21 ASSESSMENT — PAIN DESCRIPTION - FREQUENCY: FREQUENCY: CONTINUOUS

## 2021-05-21 ASSESSMENT — PAIN DESCRIPTION - LOCATION: LOCATION: KNEE

## 2021-05-21 ASSESSMENT — PAIN DESCRIPTION - PAIN TYPE: TYPE: CHRONIC PAIN

## 2021-05-21 NOTE — ANESTHESIA PRE PROCEDURE
Department of Anesthesiology  Preprocedure Note       Name:  Pino Castorena   Age:  50 y.o.  :  1965                                          MRN:  [id number]         Date:  2021      Surgeon: Evelia Awan): Erica Olivier DO    Procedure: Procedure(s):  RIGHT KNEE TOTAL KNEE ARTHROPLASTY [facility])    Medications prior to admission:   Prior to Admission medications    Medication Sig Start Date End Date Taking?  Authorizing Provider   KLOR-CON M20 20 MEQ extended release tablet Take 40 mEq by mouth daily  3/29/18  Yes Historical Provider, MD   atenolol-chlorthalidone (TENORETIC) 50-25 MG per tablet Take 1 tablet by mouth nightly  18  Yes Historical Provider, MD   citalopram (CELEXA) 40 MG tablet Take 40 mg by mouth nightly    Yes Historical Provider, MD   cetirizine (ZYRTEC) 10 MG tablet Take 10 mg by mouth daily    Historical Provider, MD   Elastic Bandages & Supports (KNEE BRACE ADJUSTABLE HINGED) MISC Wear brace when active 12/3/19   Erica Olivier DO       Current medications:    Current Facility-Administered Medications   Medication Dose Route Frequency Provider Last Rate Last Admin    chlorhexidine (HIBICLENS) 4 % liquid   Topical On Call to [facility], APRN - CNP        sodium chloride flush 0.9 % injection 10 mL  10 mL Intravenous 2 times per day Chuck Brumfield APRN - CNP        sodium chloride flush 0.9 % injection 10 mL  10 mL Intravenous PRN Chuck Brumfield APRN - CNP        0.9 % sodium chloride infusion  25 mL Intravenous PRN Chuck Brumfield APRN - CNP        vancomycin (VANCOCIN) 1,000 mg in dextrose 5 % 250 mL IVPB  1,000 mg Intravenous On Call to [facility], APRN - CNP        ortho mix injection   Injection On Call Erica Olivier DO        lactated ringers infusion   Intravenous Continuous Howard Becerra MD 50 mL/hr at 21 1130 New Bag at 21 1130    midazolam (VERSED) injection 1 mg  1 mg Intravenous PRN Howard Becerra complications:   Airway: Mallampati: I  TM distance: >3 FB   Neck ROM: full  Mouth opening: > = 3 FB Dental:          Pulmonary:Negative Pulmonary ROS breath sounds clear to auscultation      (-) not a current smoker          Patient did not smoke on day of surgery. ROS comment: Seasonal Allergies. Cardiovascular:    (+) hypertension:,       ECG reviewed  Rhythm: regular  Rate: normal           Beta Blocker:  Dose within 24 Hrs      ROS comment: EKG: Normal Sinus Rhythm 61. Neuro/Psych:   (+) psychiatric history:            GI/Hepatic/Renal:   (+) morbid obesity          Endo/Other:    (+) : arthritis: OA., .                 Abdominal:   (+) obese,         Vascular: negative vascular ROS. Anesthesia Plan      spinal     ASA 3     (Single shot Right Adductor Canal Nerve Block)  Induction: intravenous. MIPS: Postoperative opioids intended. Anesthetic plan and risks discussed with patient. Plan discussed with surgical team.                Chayito Cortez MD   5/21/2021    DOS STAFF ADDENDUM:    Pt seen and examined, chart reviewed (including anesthesia, drug and allergy history). Anesthetic plan, risks, benefits, alternatives, and personnel involved discussed with patient. Patient verbalized an understanding and agrees to proceed. Plan discussed with care team members and agreed upon.     Valentín Moss MD  Staff Anesthesiologist  11:36 AM

## 2021-05-21 NOTE — PROGRESS NOTES
[address] [facility]                                                                                                                    PRE OP INSTRUCTIONS FOR  Sergey Arteaga        Date: 5/21/2021    Date of surgery: 5-26-21   Arrival Time: Hospital will call you between 5pm and 7pm with your final arrival time for surgery    1. Do not eat or drink anything after midnight prior to surgery. This includes no water, chewing gum, mints or ice chips. 2. Take the following medications with a small sip of water on the morning of Surgery: celexa,   - please bring atenolol-chlorthalidone with you in AM    3. Diabetics may take evening dose of insulin but none after midnight. If you feel symptomatic or low blood sugar morning of surgery drink 1-2 ounces of apple juice only. 4. Aspirin, Ibuprofen, Advil, Naproxen, Vitamin E and other Anti-inflammatory products should be stopped  before surgery  as directed by your physician. Take Tylenol only unless instructed otherwise by your surgeon. 5. Check with your Doctor regarding stopping Plavix, Coumadin, Lovenox, Eliquis, Effient, or other blood thinners. 6. Do not smoke,use illicit drugs and do not drink any alcoholic beverages 24 hours prior to surgery. 7. You may brush your teeth the morning of surgery. DO NOT SWALLOW WATER    8. You MUST make arrangements for a responsible adult to take you home after your surgery. You will not be allowed to leave alone or drive yourself home. It is strongly suggested someone stay with you the first 24 hrs. Your surgery will be cancelled if you do not have a ride home. 9. PEDIATRIC PATIENTS ONLY:  A parent/legal guardian must accompany a child scheduled for surgery and plan to stay at the hospital until the child is discharged. Please do not bring other children with you.     10. Please wear simple, loose fitting clothing to the hospital.  Do not bring valuables (money, credit cards, checkbooks, etc.) Do not wear any makeup (including no eye makeup) or nail polish on your fingers or toes. 11. DO NOT wear any jewelry or piercings on day of surgery. All body piercing jewelry must be removed. 12. Shower the night before surgery with __x_Antibacterial soap /SEAN WIPES__x____    13. TOTAL JOINT REPLACEMENT/HYSTERECTOMY PATIENTS ONLY---Remember to bring Blood Bank bracelet to the hospital on the day of surgery. 14. If you have a Living Will and Durable Power of  for Healthcare, please bring in a copy. 15. If appropriate bring crutches, inspirex, WALKER, CANE etc... 12. Notify your Surgeon if you develop any illness between now and surgery time, cough, cold, fever, sore throat, nausea, vomiting, etc.  Please notify your surgeon if you experience dizziness, shortness of breath or blurred vision between now & the time of your surgery. 17. If you have ___dentures, they will be removed before going to the OR; we will provide you a container. If you wear ___contact lenses or __x_glasses, they will be removed; please bring a case for them. 18. To provide excellent care visitors will be limited to 2 in the room at any given time. 19. Please bring picture ID and insurance card. 20. Sleep apnea patients need to bring CPAP AND SETTINGS to hospital on day of surgery. 21. During flu season no children under the age of 15 are permitted in the hospital for the safety of all patients. 22. Other              Please call AMBULATORY CARE if you have any further questions.    [address] [facility]     [address]

## 2021-05-22 LAB — MRSA CULTURE ONLY: NORMAL

## 2021-05-23 LAB — URINE CULTURE, ROUTINE: NORMAL

## 2021-05-26 ENCOUNTER — HOSPITAL ENCOUNTER (OUTPATIENT)
Age: 56
Setting detail: OBSERVATION
Discharge: HOME | End: 2021-05-27
Attending: ORTHOPAEDIC SURGERY | Admitting: ORTHOPAEDIC SURGERY
Payer: COMMERCIAL

## 2021-05-26 ENCOUNTER — APPOINTMENT (OUTPATIENT)
Dept: GENERAL RADIOLOGY | Age: 56
End: 2021-05-26
Attending: ORTHOPAEDIC SURGERY
Payer: COMMERCIAL

## 2021-05-26 ENCOUNTER — ANESTHESIA (OUTPATIENT)
Dept: OPERATING ROOM | Age: 56
End: 2021-05-26
Payer: COMMERCIAL

## 2021-05-26 VITALS
RESPIRATION RATE: 16 BRPM | OXYGEN SATURATION: 96 % | DIASTOLIC BLOOD PRESSURE: 68 MMHG | SYSTOLIC BLOOD PRESSURE: 101 MMHG

## 2021-05-26 DIAGNOSIS — Z96.651 STATUS POST TOTAL RIGHT KNEE REPLACEMENT: ICD-10-CM

## 2021-05-26 DIAGNOSIS — Z98.890 HISTORY OF SURGERY: ICD-10-CM

## 2021-05-26 DIAGNOSIS — M17.11 PRIMARY OSTEOARTHRITIS OF RIGHT KNEE: Primary | ICD-10-CM

## 2021-05-26 LAB
ALBUMIN SERPL-MCNC: 4 G/DL (ref 3.5–5.2)
ALP BLD-CCNC: 63 U/L (ref 35–104)
ALT SERPL-CCNC: 24 U/L (ref 0–32)
ANION GAP SERPL CALCULATED.3IONS-SCNC: 14 MMOL/L (ref 7–16)
AST SERPL-CCNC: 27 U/L (ref 0–31)
BASOPHILS ABSOLUTE: 0.02 E9/L (ref 0–0.2)
BASOPHILS RELATIVE PERCENT: 0.2 % (ref 0–2)
BILIRUB SERPL-MCNC: 0.4 MG/DL (ref 0–1.2)
BUN BLDV-MCNC: 14 MG/DL (ref 6–20)
CALCIUM SERPL-MCNC: 8.8 MG/DL (ref 8.6–10.2)
CHLORIDE BLD-SCNC: 101 MMOL/L (ref 98–107)
CO2: 24 MMOL/L (ref 22–29)
CREAT SERPL-MCNC: 0.8 MG/DL (ref 0.5–1)
EOSINOPHILS ABSOLUTE: 0 E9/L (ref 0.05–0.5)
EOSINOPHILS RELATIVE PERCENT: 0 % (ref 0–6)
GFR AFRICAN AMERICAN: >60
GFR NON-AFRICAN AMERICAN: >60 ML/MIN/1.73
GLUCOSE BLD-MCNC: 223 MG/DL (ref 74–99)
HCT VFR BLD CALC: 37.1 % (ref 34–48)
HEMOGLOBIN: 12 G/DL (ref 11.5–15.5)
IMMATURE GRANULOCYTES #: 0.06 E9/L
IMMATURE GRANULOCYTES %: 0.6 % (ref 0–5)
LYMPHOCYTES ABSOLUTE: 0.75 E9/L (ref 1.5–4)
LYMPHOCYTES RELATIVE PERCENT: 7.1 % (ref 20–42)
MCH RBC QN AUTO: 30.7 PG (ref 26–35)
MCHC RBC AUTO-ENTMCNC: 32.3 % (ref 32–34.5)
MCV RBC AUTO: 94.9 FL (ref 80–99.9)
MONOCYTES ABSOLUTE: 0.06 E9/L (ref 0.1–0.95)
MONOCYTES RELATIVE PERCENT: 0.6 % (ref 2–12)
NEUTROPHILS ABSOLUTE: 9.74 E9/L (ref 1.8–7.3)
NEUTROPHILS RELATIVE PERCENT: 91.5 % (ref 43–80)
PDW BLD-RTO: 13.1 FL (ref 11.5–15)
PLATELET # BLD: 248 E9/L (ref 130–450)
PMV BLD AUTO: 10.2 FL (ref 7–12)
POTASSIUM SERPL-SCNC: 3.1 MMOL/L (ref 3.5–5)
RBC # BLD: 3.91 E12/L (ref 3.5–5.5)
SODIUM BLD-SCNC: 139 MMOL/L (ref 132–146)
TOTAL PROTEIN: 7.1 G/DL (ref 6.4–8.3)
TROPONIN, HIGH SENSITIVITY: 8 NG/L (ref 0–9)
WBC # BLD: 10.6 E9/L (ref 4.5–11.5)

## 2021-05-26 PROCEDURE — 6360000002 HC RX W HCPCS: Performed by: ORTHOPAEDIC SURGERY

## 2021-05-26 PROCEDURE — 88311 DECALCIFY TISSUE: CPT

## 2021-05-26 PROCEDURE — C1713 ANCHOR/SCREW BN/BN,TIS/BN: HCPCS | Performed by: ORTHOPAEDIC SURGERY

## 2021-05-26 PROCEDURE — 80053 COMPREHEN METABOLIC PANEL: CPT

## 2021-05-26 PROCEDURE — 2580000003 HC RX 258: Performed by: ANESTHESIOLOGY

## 2021-05-26 PROCEDURE — 6360000002 HC RX W HCPCS: Performed by: ANESTHESIOLOGY

## 2021-05-26 PROCEDURE — 73560 X-RAY EXAM OF KNEE 1 OR 2: CPT

## 2021-05-26 PROCEDURE — 36415 COLL VENOUS BLD VENIPUNCTURE: CPT

## 2021-05-26 PROCEDURE — 97110 THERAPEUTIC EXERCISES: CPT | Performed by: PHYSICAL THERAPIST

## 2021-05-26 PROCEDURE — 3600000015 HC SURGERY LEVEL 5 ADDTL 15MIN: Performed by: ORTHOPAEDIC SURGERY

## 2021-05-26 PROCEDURE — 2500000003 HC RX 250 WO HCPCS: Performed by: ORTHOPAEDIC SURGERY

## 2021-05-26 PROCEDURE — 7100000000 HC PACU RECOVERY - FIRST 15 MIN: Performed by: ORTHOPAEDIC SURGERY

## 2021-05-26 PROCEDURE — 84484 ASSAY OF TROPONIN QUANT: CPT

## 2021-05-26 PROCEDURE — 6370000000 HC RX 637 (ALT 250 FOR IP): Performed by: ORTHOPAEDIC SURGERY

## 2021-05-26 PROCEDURE — 2709999900 HC NON-CHARGEABLE SUPPLY: Performed by: ORTHOPAEDIC SURGERY

## 2021-05-26 PROCEDURE — 97112 NEUROMUSCULAR REEDUCATION: CPT | Performed by: PHYSICAL THERAPIST

## 2021-05-26 PROCEDURE — 3700000000 HC ANESTHESIA ATTENDED CARE: Performed by: ORTHOPAEDIC SURGERY

## 2021-05-26 PROCEDURE — 85025 COMPLETE CBC W/AUTO DIFF WBC: CPT

## 2021-05-26 PROCEDURE — G0378 HOSPITAL OBSERVATION PER HR: HCPCS

## 2021-05-26 PROCEDURE — 7100000001 HC PACU RECOVERY - ADDTL 15 MIN: Performed by: ORTHOPAEDIC SURGERY

## 2021-05-26 PROCEDURE — 2580000003 HC RX 258: Performed by: ORTHOPAEDIC SURGERY

## 2021-05-26 PROCEDURE — C1776 JOINT DEVICE (IMPLANTABLE): HCPCS | Performed by: ORTHOPAEDIC SURGERY

## 2021-05-26 PROCEDURE — 6360000002 HC RX W HCPCS

## 2021-05-26 PROCEDURE — 97161 PT EVAL LOW COMPLEX 20 MIN: CPT | Performed by: PHYSICAL THERAPIST

## 2021-05-26 PROCEDURE — 6360000002 HC RX W HCPCS: Performed by: NURSE PRACTITIONER

## 2021-05-26 PROCEDURE — 3600000005 HC SURGERY LEVEL 5 BASE: Performed by: ORTHOPAEDIC SURGERY

## 2021-05-26 PROCEDURE — 88305 TISSUE EXAM BY PATHOLOGIST: CPT

## 2021-05-26 PROCEDURE — 27447 TOTAL KNEE ARTHROPLASTY: CPT | Performed by: ORTHOPAEDIC SURGERY

## 2021-05-26 PROCEDURE — 76942 ECHO GUIDE FOR BIOPSY: CPT | Performed by: ANESTHESIOLOGY

## 2021-05-26 PROCEDURE — 6370000000 HC RX 637 (ALT 250 FOR IP): Performed by: NURSE PRACTITIONER

## 2021-05-26 PROCEDURE — 3700000001 HC ADD 15 MINUTES (ANESTHESIA): Performed by: ORTHOPAEDIC SURGERY

## 2021-05-26 DEVICE — GEN II 7.5MM RESUR PAT 29MM
Type: IMPLANTABLE DEVICE | Site: KNEE | Status: FUNCTIONAL
Brand: GENESIS II

## 2021-05-26 DEVICE — LEGION POSTERIOR STABILIZED                                    OXINIUM FEMORAL SIZE 6 RIGHT
Type: IMPLANTABLE DEVICE | Site: KNEE | Status: FUNCTIONAL
Brand: LEGION

## 2021-05-26 DEVICE — KNEE K1 TOT HEMI STD CEM IMPL CAPPED K1 SN: Type: IMPLANTABLE DEVICE | Status: FUNCTIONAL

## 2021-05-26 DEVICE — LEGION PS HIGH FLEX XLPE SZ 3-4 13MM
Type: IMPLANTABLE DEVICE | Site: KNEE | Status: FUNCTIONAL
Brand: LEGION

## 2021-05-26 DEVICE — GENESIS II NON-POROUS TIBIAL                                    BASEPLATE SIZE 4 RIGHT
Type: IMPLANTABLE DEVICE | Site: KNEE | Status: FUNCTIONAL
Brand: GENESIS II

## 2021-05-26 DEVICE — CEMENT BNE 40 GM RADIOPAQUE BA SIMPLEX P: Type: IMPLANTABLE DEVICE | Site: KNEE | Status: FUNCTIONAL

## 2021-05-26 RX ORDER — FENTANYL CITRATE 50 UG/ML
INJECTION, SOLUTION INTRAMUSCULAR; INTRAVENOUS
Status: COMPLETED
Start: 2021-05-26 | End: 2021-05-26

## 2021-05-26 RX ORDER — CEFAZOLIN SODIUM 2 G/50ML
2000 SOLUTION INTRAVENOUS EVERY 8 HOURS
Status: DISCONTINUED | OUTPATIENT
Start: 2021-05-26 | End: 2021-05-26 | Stop reason: CLARIF

## 2021-05-26 RX ORDER — MEPERIDINE HYDROCHLORIDE 25 MG/ML
12.5 INJECTION INTRAMUSCULAR; INTRAVENOUS; SUBCUTANEOUS EVERY 5 MIN PRN
Status: DISCONTINUED | OUTPATIENT
Start: 2021-05-26 | End: 2021-05-26 | Stop reason: HOSPADM

## 2021-05-26 RX ORDER — ATENOLOL 50 MG/1
50 TABLET ORAL NIGHTLY
Status: DISCONTINUED | OUTPATIENT
Start: 2021-05-26 | End: 2021-05-27 | Stop reason: HOSPADM

## 2021-05-26 RX ORDER — MIDAZOLAM HYDROCHLORIDE 1 MG/ML
INJECTION INTRAMUSCULAR; INTRAVENOUS
Status: DISPENSED
Start: 2021-05-26 | End: 2021-05-26

## 2021-05-26 RX ORDER — ATENOLOL AND CHLORTHALIDONE TABLET 50; 25 MG/1; MG/1
1 TABLET ORAL NIGHTLY
Status: DISCONTINUED | OUTPATIENT
Start: 2021-05-26 | End: 2021-05-26

## 2021-05-26 RX ORDER — PROMETHAZINE HYDROCHLORIDE 25 MG/ML
6.25 INJECTION, SOLUTION INTRAMUSCULAR; INTRAVENOUS
Status: DISCONTINUED | OUTPATIENT
Start: 2021-05-26 | End: 2021-05-26 | Stop reason: HOSPADM

## 2021-05-26 RX ORDER — VANCOMYCIN HYDROCHLORIDE 1 G/20ML
INJECTION, POWDER, LYOPHILIZED, FOR SOLUTION INTRAVENOUS PRN
Status: DISCONTINUED | OUTPATIENT
Start: 2021-05-26 | End: 2021-05-26 | Stop reason: SDUPTHER

## 2021-05-26 RX ORDER — CELECOXIB 100 MG/1
200 CAPSULE ORAL EVERY 12 HOURS
Status: DISCONTINUED | OUTPATIENT
Start: 2021-05-26 | End: 2021-05-27 | Stop reason: HOSPADM

## 2021-05-26 RX ORDER — ROPIVACAINE HYDROCHLORIDE 5 MG/ML
INJECTION, SOLUTION EPIDURAL; INFILTRATION; PERINEURAL
Status: COMPLETED | OUTPATIENT
Start: 2021-05-26 | End: 2021-05-26

## 2021-05-26 RX ORDER — ONDANSETRON 2 MG/ML
INJECTION INTRAMUSCULAR; INTRAVENOUS PRN
Status: DISCONTINUED | OUTPATIENT
Start: 2021-05-26 | End: 2021-05-26 | Stop reason: SDUPTHER

## 2021-05-26 RX ORDER — DEXAMETHASONE SODIUM PHOSPHATE 10 MG/ML
8 INJECTION INTRAMUSCULAR; INTRAVENOUS ONCE
Status: COMPLETED | OUTPATIENT
Start: 2021-05-26 | End: 2021-05-26

## 2021-05-26 RX ORDER — VANCOMYCIN HYDROCHLORIDE 1 G/20ML
INJECTION, POWDER, LYOPHILIZED, FOR SOLUTION INTRAVENOUS PRN
Status: DISCONTINUED | OUTPATIENT
Start: 2021-05-26 | End: 2021-05-26 | Stop reason: ALTCHOICE

## 2021-05-26 RX ORDER — DOCUSATE SODIUM 100 MG/1
100 CAPSULE, LIQUID FILLED ORAL 2 TIMES DAILY
Status: DISCONTINUED | OUTPATIENT
Start: 2021-05-26 | End: 2021-05-27 | Stop reason: HOSPADM

## 2021-05-26 RX ORDER — PROPOFOL 10 MG/ML
INJECTION, EMULSION INTRAVENOUS PRN
Status: DISCONTINUED | OUTPATIENT
Start: 2021-05-26 | End: 2021-05-26 | Stop reason: SDUPTHER

## 2021-05-26 RX ORDER — FENTANYL CITRATE 50 UG/ML
INJECTION, SOLUTION INTRAMUSCULAR; INTRAVENOUS PRN
Status: DISCONTINUED | OUTPATIENT
Start: 2021-05-26 | End: 2021-05-26 | Stop reason: SDUPTHER

## 2021-05-26 RX ORDER — BISACODYL 10 MG
10 SUPPOSITORY, RECTAL RECTAL PRN
Status: DISCONTINUED | OUTPATIENT
Start: 2021-05-26 | End: 2021-05-27 | Stop reason: HOSPADM

## 2021-05-26 RX ORDER — ASPIRIN 325 MG
325 TABLET, DELAYED RELEASE (ENTERIC COATED) ORAL 2 TIMES DAILY
Qty: 56 TABLET | Refills: 0 | Status: ON HOLD | OUTPATIENT
Start: 2021-05-26 | End: 2021-12-01 | Stop reason: HOSPADM

## 2021-05-26 RX ORDER — CHLORTHALIDONE 25 MG/1
25 TABLET ORAL NIGHTLY
Status: DISCONTINUED | OUTPATIENT
Start: 2021-05-26 | End: 2021-05-27 | Stop reason: HOSPADM

## 2021-05-26 RX ORDER — OXYCODONE AND ACETAMINOPHEN 10; 325 MG/1; MG/1
1 TABLET ORAL EVERY 4 HOURS PRN
Status: DISCONTINUED | OUTPATIENT
Start: 2021-05-26 | End: 2021-05-27 | Stop reason: HOSPADM

## 2021-05-26 RX ORDER — CITALOPRAM 20 MG/1
40 TABLET ORAL NIGHTLY
Status: DISCONTINUED | OUTPATIENT
Start: 2021-05-26 | End: 2021-05-27 | Stop reason: HOSPADM

## 2021-05-26 RX ORDER — ROPIVACAINE HYDROCHLORIDE 5 MG/ML
40 INJECTION, SOLUTION EPIDURAL; INFILTRATION; PERINEURAL
Status: DISCONTINUED | OUTPATIENT
Start: 2021-05-26 | End: 2021-05-26 | Stop reason: HOSPADM

## 2021-05-26 RX ORDER — LIDOCAINE HYDROCHLORIDE 20 MG/ML
INJECTION, SOLUTION INTRAVENOUS PRN
Status: DISCONTINUED | OUTPATIENT
Start: 2021-05-26 | End: 2021-05-26 | Stop reason: SDUPTHER

## 2021-05-26 RX ORDER — MORPHINE SULFATE 4 MG/ML
4 INJECTION, SOLUTION INTRAMUSCULAR; INTRAVENOUS EVERY 4 HOURS PRN
Status: DISCONTINUED | OUTPATIENT
Start: 2021-05-26 | End: 2021-05-27 | Stop reason: HOSPADM

## 2021-05-26 RX ORDER — CETIRIZINE HYDROCHLORIDE 10 MG/1
10 TABLET ORAL DAILY
Status: DISCONTINUED | OUTPATIENT
Start: 2021-05-26 | End: 2021-05-27 | Stop reason: HOSPADM

## 2021-05-26 RX ORDER — OXYCODONE HYDROCHLORIDE AND ACETAMINOPHEN 5; 325 MG/1; MG/1
1 TABLET ORAL EVERY 6 HOURS PRN
Qty: 28 TABLET | Refills: 0 | Status: SHIPPED | OUTPATIENT
Start: 2021-05-26 | End: 2021-06-07 | Stop reason: SDUPTHER

## 2021-05-26 RX ORDER — M-VIT,TX,IRON,MINS/CALC/FOLIC 27MG-0.4MG
1 TABLET ORAL DAILY
Status: DISCONTINUED | OUTPATIENT
Start: 2021-05-26 | End: 2021-05-27 | Stop reason: HOSPADM

## 2021-05-26 RX ORDER — CHLORHEXIDINE GLUCONATE 4 G/100ML
SOLUTION TOPICAL
Status: DISCONTINUED | OUTPATIENT
Start: 2021-05-26 | End: 2021-05-26 | Stop reason: HOSPADM

## 2021-05-26 RX ORDER — ACETAMINOPHEN 325 MG/1
650 TABLET ORAL EVERY 4 HOURS PRN
Status: DISCONTINUED | OUTPATIENT
Start: 2021-05-26 | End: 2021-05-27 | Stop reason: HOSPADM

## 2021-05-26 RX ORDER — CELECOXIB 100 MG/1
200 CAPSULE ORAL ONCE
Status: COMPLETED | OUTPATIENT
Start: 2021-05-26 | End: 2021-05-26

## 2021-05-26 RX ORDER — SODIUM CHLORIDE, SODIUM LACTATE, POTASSIUM CHLORIDE, CALCIUM CHLORIDE 600; 310; 30; 20 MG/100ML; MG/100ML; MG/100ML; MG/100ML
INJECTION, SOLUTION INTRAVENOUS CONTINUOUS
Status: DISCONTINUED | OUTPATIENT
Start: 2021-05-26 | End: 2021-05-26

## 2021-05-26 RX ORDER — DEXTROSE, SODIUM CHLORIDE, AND POTASSIUM CHLORIDE 5; .45; .15 G/100ML; G/100ML; G/100ML
INJECTION INTRAVENOUS CONTINUOUS
Status: DISCONTINUED | OUTPATIENT
Start: 2021-05-26 | End: 2021-05-27 | Stop reason: HOSPADM

## 2021-05-26 RX ORDER — MIDAZOLAM HYDROCHLORIDE 1 MG/ML
1 INJECTION INTRAMUSCULAR; INTRAVENOUS PRN
Status: COMPLETED | OUTPATIENT
Start: 2021-05-26 | End: 2021-05-26

## 2021-05-26 RX ORDER — POTASSIUM CHLORIDE 20 MEQ/1
40 TABLET, EXTENDED RELEASE ORAL DAILY
Status: DISCONTINUED | OUTPATIENT
Start: 2021-05-26 | End: 2021-05-27 | Stop reason: HOSPADM

## 2021-05-26 RX ORDER — PROMETHAZINE HYDROCHLORIDE 25 MG/ML
25 INJECTION, SOLUTION INTRAMUSCULAR; INTRAVENOUS EVERY 6 HOURS PRN
Status: DISCONTINUED | OUTPATIENT
Start: 2021-05-26 | End: 2021-05-27 | Stop reason: HOSPADM

## 2021-05-26 RX ORDER — ACETAMINOPHEN 500 MG
1000 TABLET ORAL ONCE
Status: COMPLETED | OUTPATIENT
Start: 2021-05-26 | End: 2021-05-26

## 2021-05-26 RX ORDER — GABAPENTIN 100 MG/1
100 CAPSULE ORAL 3 TIMES DAILY
Qty: 90 CAPSULE | Refills: 0 | Status: SHIPPED | OUTPATIENT
Start: 2021-05-26 | End: 2021-11-29

## 2021-05-26 RX ORDER — MORPHINE SULFATE 2 MG/ML
2 INJECTION, SOLUTION INTRAMUSCULAR; INTRAVENOUS EVERY 5 MIN PRN
Status: DISCONTINUED | OUTPATIENT
Start: 2021-05-26 | End: 2021-05-26 | Stop reason: HOSPADM

## 2021-05-26 RX ORDER — DEXAMETHASONE SODIUM PHOSPHATE 10 MG/ML
INJECTION, SOLUTION INTRAMUSCULAR; INTRAVENOUS PRN
Status: DISCONTINUED | OUTPATIENT
Start: 2021-05-26 | End: 2021-05-26 | Stop reason: SDUPTHER

## 2021-05-26 RX ORDER — GABAPENTIN 300 MG/1
600 CAPSULE ORAL ONCE
Status: COMPLETED | OUTPATIENT
Start: 2021-05-26 | End: 2021-05-26

## 2021-05-26 RX ORDER — ONDANSETRON 2 MG/ML
4 INJECTION INTRAMUSCULAR; INTRAVENOUS
Status: DISCONTINUED | OUTPATIENT
Start: 2021-05-26 | End: 2021-05-26 | Stop reason: HOSPADM

## 2021-05-26 RX ORDER — CELECOXIB 100 MG/1
100 CAPSULE ORAL DAILY
Qty: 30 CAPSULE | Refills: 0 | Status: SHIPPED | OUTPATIENT
Start: 2021-05-26 | End: 2021-11-29

## 2021-05-26 RX ORDER — SODIUM CHLORIDE 0.9 % (FLUSH) 0.9 %
10 SYRINGE (ML) INJECTION EVERY 12 HOURS SCHEDULED
Status: DISCONTINUED | OUTPATIENT
Start: 2021-05-26 | End: 2021-05-26 | Stop reason: HOSPADM

## 2021-05-26 RX ORDER — SODIUM CHLORIDE 0.9 % (FLUSH) 0.9 %
10 SYRINGE (ML) INJECTION PRN
Status: DISCONTINUED | OUTPATIENT
Start: 2021-05-26 | End: 2021-05-26 | Stop reason: HOSPADM

## 2021-05-26 RX ORDER — FENTANYL CITRATE 50 UG/ML
50 INJECTION, SOLUTION INTRAMUSCULAR; INTRAVENOUS
Status: CANCELLED | OUTPATIENT
Start: 2021-05-26 | End: 2021-05-26

## 2021-05-26 RX ORDER — GABAPENTIN 100 MG/1
100 CAPSULE ORAL EVERY 8 HOURS
Status: DISCONTINUED | OUTPATIENT
Start: 2021-05-26 | End: 2021-05-27 | Stop reason: HOSPADM

## 2021-05-26 RX ORDER — SODIUM CHLORIDE 9 MG/ML
25 INJECTION, SOLUTION INTRAVENOUS PRN
Status: DISCONTINUED | OUTPATIENT
Start: 2021-05-26 | End: 2021-05-26 | Stop reason: HOSPADM

## 2021-05-26 RX ADMIN — FENTANYL CITRATE 25 MCG: 50 INJECTION, SOLUTION INTRAMUSCULAR; INTRAVENOUS at 12:33

## 2021-05-26 RX ADMIN — DEXAMETHASONE SODIUM PHOSPHATE 10 MG: 10 INJECTION, SOLUTION INTRAMUSCULAR; INTRAVENOUS at 12:40

## 2021-05-26 RX ADMIN — PHENYLEPHRINE HYDROCHLORIDE 100 MCG: 10 INJECTION INTRAVENOUS at 12:42

## 2021-05-26 RX ADMIN — ONDANSETRON 4 MG: 2 INJECTION INTRAMUSCULAR; INTRAVENOUS at 14:34

## 2021-05-26 RX ADMIN — VANCOMYCIN HYDROCHLORIDE 1000 MG: 1 INJECTION, POWDER, LYOPHILIZED, FOR SOLUTION INTRAVENOUS at 12:38

## 2021-05-26 RX ADMIN — GABAPENTIN 600 MG: 300 CAPSULE ORAL at 11:31

## 2021-05-26 RX ADMIN — SODIUM CHLORIDE, POTASSIUM CHLORIDE, SODIUM LACTATE AND CALCIUM CHLORIDE: 600; 310; 30; 20 INJECTION, SOLUTION INTRAVENOUS at 11:59

## 2021-05-26 RX ADMIN — PHENYLEPHRINE HYDROCHLORIDE 100 MCG: 10 INJECTION INTRAVENOUS at 13:34

## 2021-05-26 RX ADMIN — PHENYLEPHRINE HYDROCHLORIDE 100 MCG: 10 INJECTION INTRAVENOUS at 14:07

## 2021-05-26 RX ADMIN — ROPIVACAINE HYDROCHLORIDE 35 ML: 5 INJECTION, SOLUTION EPIDURAL; INFILTRATION; PERINEURAL at 11:59

## 2021-05-26 RX ADMIN — PHENYLEPHRINE HYDROCHLORIDE 100 MCG: 10 INJECTION INTRAVENOUS at 12:57

## 2021-05-26 RX ADMIN — DEXAMETHASONE SODIUM PHOSPHATE 8 MG: 10 INJECTION INTRAMUSCULAR; INTRAVENOUS at 11:33

## 2021-05-26 RX ADMIN — PHENYLEPHRINE HYDROCHLORIDE 150 MCG: 10 INJECTION INTRAVENOUS at 13:05

## 2021-05-26 RX ADMIN — CELECOXIB 200 MG: 100 CAPSULE ORAL at 23:32

## 2021-05-26 RX ADMIN — SODIUM CHLORIDE, POTASSIUM CHLORIDE, SODIUM LACTATE AND CALCIUM CHLORIDE: 600; 310; 30; 20 INJECTION, SOLUTION INTRAVENOUS at 11:30

## 2021-05-26 RX ADMIN — OXYCODONE HYDROCHLORIDE AND ACETAMINOPHEN 1 TABLET: 10; 325 TABLET ORAL at 23:31

## 2021-05-26 RX ADMIN — LIDOCAINE HYDROCHLORIDE 40 MG: 20 INJECTION, SOLUTION INTRAVENOUS at 12:40

## 2021-05-26 RX ADMIN — POTASSIUM CHLORIDE, DEXTROSE MONOHYDRATE AND SODIUM CHLORIDE: 150; 5; 450 INJECTION, SOLUTION INTRAVENOUS at 17:15

## 2021-05-26 RX ADMIN — MIDAZOLAM 1 MG: 1 INJECTION INTRAMUSCULAR; INTRAVENOUS at 11:52

## 2021-05-26 RX ADMIN — ACETAMINOPHEN 1000 MG: 500 TABLET ORAL at 11:30

## 2021-05-26 RX ADMIN — MIDAZOLAM 1 MG: 1 INJECTION INTRAMUSCULAR; INTRAVENOUS at 11:57

## 2021-05-26 RX ADMIN — Medication 1 TABLET: at 20:58

## 2021-05-26 RX ADMIN — GABAPENTIN 100 MG: 100 CAPSULE ORAL at 20:58

## 2021-05-26 RX ADMIN — POTASSIUM CHLORIDE 40 MEQ: 20 TABLET, EXTENDED RELEASE ORAL at 20:58

## 2021-05-26 RX ADMIN — PHENYLEPHRINE HYDROCHLORIDE 150 MCG: 10 INJECTION INTRAVENOUS at 13:15

## 2021-05-26 RX ADMIN — CETIRIZINE HYDROCHLORIDE 10 MG: 10 TABLET, FILM COATED ORAL at 20:59

## 2021-05-26 RX ADMIN — DOCUSATE SODIUM 100 MG: 100 CAPSULE, LIQUID FILLED ORAL at 20:59

## 2021-05-26 RX ADMIN — CELECOXIB 200 MG: 100 CAPSULE ORAL at 11:31

## 2021-05-26 RX ADMIN — CITALOPRAM HYDROBROMIDE 40 MG: 20 TABLET ORAL at 20:59

## 2021-05-26 RX ADMIN — PROPOFOL INJECTABLE EMULSION 75 MCG/KG/MIN: 10 INJECTION, EMULSION INTRAVENOUS at 12:40

## 2021-05-26 ASSESSMENT — PULMONARY FUNCTION TESTS
PIF_VALUE: 1
PIF_VALUE: 0
PIF_VALUE: 1
PIF_VALUE: 0
PIF_VALUE: 0
PIF_VALUE: 1
PIF_VALUE: 0
PIF_VALUE: 1
PIF_VALUE: 0
PIF_VALUE: 1
PIF_VALUE: 0
PIF_VALUE: 0
PIF_VALUE: 1
PIF_VALUE: 0
PIF_VALUE: 1
PIF_VALUE: 0
PIF_VALUE: 1
PIF_VALUE: 0
PIF_VALUE: 1
PIF_VALUE: 0
PIF_VALUE: 1
PIF_VALUE: 0

## 2021-05-26 ASSESSMENT — LIFESTYLE VARIABLES: SMOKING_STATUS: 0

## 2021-05-26 ASSESSMENT — PAIN DESCRIPTION - DESCRIPTORS: DESCRIPTORS: ACHING;DISCOMFORT

## 2021-05-26 ASSESSMENT — PAIN DESCRIPTION - LOCATION: LOCATION: KNEE

## 2021-05-26 ASSESSMENT — PAIN SCALES - GENERAL
PAINLEVEL_OUTOF10: 0
PAINLEVEL_OUTOF10: 4

## 2021-05-26 ASSESSMENT — PAIN DESCRIPTION - ORIENTATION: ORIENTATION: RIGHT

## 2021-05-26 NOTE — ANESTHESIA PROCEDURE NOTES
Peripheral Block    Patient location during procedure: PACU  Start time: 5/26/2021 11:52 AM  End time: 5/26/2021 11:59 AM  Staffing  Performed: anesthesiologist and other anesthesia staff   Anesthesiologist: Melany Blackman MD  Other anesthesia staff: Shannon Jaramillo RN  Preanesthetic Checklist  Completed: patient identified, IV checked, site marked, risks and benefits discussed, surgical consent, monitors and equipment checked, pre-op evaluation, timeout performed, anesthesia consent given, oxygen available and patient being monitored  Peripheral Block  Patient position: supine  Prep: ChloraPrep  Patient monitoring: cardiac monitor, continuous pulse ox, frequent blood pressure checks and IV access  Block type: Femoral  Laterality: right  Injection technique: single-shot  Guidance: ultrasound guided  Local infiltration: lidocaine  Infiltration strength: 1 %  Dose: 3 mL  Adductor canal  Provider prep: mask and sterile gloves  Local infiltration: lidocaine  Needle  Needle type: combined needle/nerve stimulator   Needle gauge: 20 G  Needle length: 10 cm  Needle localization: ultrasound guidance  Assessment  Injection assessment: negative aspiration for heme, no paresthesia on injection and local visualized surrounding nerve on ultrasound  Slow fractionated injection: yes  Hemodynamics: stable  Additional Notes  U/S [id number].  (1) Under ultrasound guidance, a 22 gauge needle was inserted and placed in close proximity to the right femoral nerve.  (2) Ultrasound was also used to visualize the spread of the anesthetic in close proximity to the nerve being blocked. (3) The nerve appeared anatomically normal, and (4 there were no apparent abnormal pathological findings on the image that were readily visible and related to the nerve being blocked. (5) A permanent ultrasound image was saved in the patient's record.       Medications Administered  Ropivacaine (NAROPIN) injection 0.5%, 35 mL  Reason for block: post-op pain management and at surgeon's request

## 2021-05-26 NOTE — PROGRESS NOTES
1151 Time out done.  [id number] Right adduc tor nerve block done per Dr. Starlin Kussmaul tolerated well

## 2021-05-26 NOTE — ANESTHESIA PROCEDURE NOTES
Spinal Block    Patient location during procedure: OR  Reason for block: primary anesthetic  Staffing  Performed: anesthesiologist and other anesthesia staff   Anesthesiologist: Zoey Loyd MD  Other anesthesia staff: Franklin Gill RN  Preanesthetic Checklist  Completed: patient identified, IV checked, site marked, risks and benefits discussed, surgical consent, monitors and equipment checked, pre-op evaluation, timeout performed, anesthesia consent given, oxygen available and patient being monitored  Spinal Block  Patient position: sitting  Prep: Betadine  Patient monitoring: continuous pulse ox and frequent blood pressure checks  Location: L4/L5  Provider prep: mask and sterile gloves  Local infiltration: lidocaine  Dose: 0.5  Agent: bupivacaine  Adjuvant: fentanyl  Dose: 1.6  Dose: 1.6  Needle  Needle type: Pencan   Needle gauge: 25 G  Needle length: 3.5 in  Assessment  Swirl obtained: Yes  CSF: clear  Attempts: 2  Hemodynamics: stable

## 2021-05-26 NOTE — CONSULTS
Department of Internal Medicine  Internal Medicine Consultation Note    Primary Care Physician: Chela Vizcaino MD   Admitting Physician:  Juan Antonio Lenz DO  Admission date and time: 5/26/2021 10:25 AM    Room:  [facility]033-  Admitting diagnosis: Primary osteoarthritis of right knee [M17.11]    Patient Name: Sylvester Kay  MRN: [id number]    Date of Service: 5/27/2021     Reason for consultation:   Medical management    History of present illness:    Patient is 61-year-old female who is status post right total knee replacement. Patient states pain is controlled. Denies any fever or chills. Denies any lightheadedness or dizziness. Denies any chest pain or shortness of breath. Patient does not use CPAP or BiPAP overnight. PAST MEDICAL Hx:  Past Medical History:   Diagnosis Date    Arthritis     Depression     HTN (hypertension)     Seasonal allergies        PAST SURGICAL Hx:   Past Surgical History:   Procedure Laterality Date    CHOLECYSTECTOMY      COLONOSCOPY      DILATION AND CURETTAGE OF UTERUS      TONSILLECTOMY         FAMILY Hx:  Family History   Problem Relation Age of Onset    Diabetes Mother     Hypertension Mother     High Cholesterol Father        HOME MEDICATIONS:  Prior to Admission medications    Medication Sig Start Date End Date Taking? Authorizing Provider   aspirin 325 MG EC tablet Take 1 tablet by mouth 2 times daily 5/26/21 5/26/22 Yes Zafar Rosalinda, DO   oxyCODONE-acetaminophen (PERCOCET) 5-325 MG per tablet Take 1 tablet by mouth every 6 hours as needed for Pain for up to 7 days. Intended supply: 7 days. Take lowest dose possible to manage pain 5/26/21 6/2/21 Yes Zafar Rosalinda, DO   celecoxib (CELEBREX) 100 MG capsule Take 1 capsule by mouth daily 5/26/21 6/25/21 Yes Zafar Rosalinda, DO   gabapentin (NEURONTIN) 100 MG capsule Take 1 capsule by mouth 3 times daily for 30 days.  5/26/21 6/25/21 Yes Zafar Rosalinda, DO   cetirizine (ZYRTEC) 10 MG tablet Take 10 mg by mouth daily Emotionally Abused:     Physically Abused:     Sexually Abused:        ROS: Positive in bold  General:   Denies chills, fatigue, fever, malaise, night sweats or weight loss    Psychological:   Denies anxiety, disorientation or hallucinations    ENT:    Denies epistaxis, headaches, vertigo or visual changes    Cardiovascular:   Denies any chest pain, irregular heartbeats, or palpitations. No paroxysmal nocturnal dyspnea. Respiratory:   Denies shortness of breath, coughing, sputum production, hemoptysis, or wheezing. No orthopnea. Gastrointestinal:   Denies nausea, vomiting, diarrhea, or constipation. Denies any abdominal pain. Denies change in bowel habits or stools. Genito-Urinary:    Denies any urgency, frequency, hematuria. Voiding without difficulty. Musculoskeletal:   Denies joint pain, joint stiffness, joint swelling or muscle pain    Neurology:    Denies any headache or focal neurological deficits. No weakness or paresthesia. Derm:    Denies any rashes, ulcers, or excoriations. Denies bruising. Extremities:   Denies any lower extremity swelling or edema. PHYSICAL EXAM: Abnormal findings noted  VITALS:  Vitals:    05/27/21 0149   BP: 105/73   Pulse: 79   Resp: 18   Temp: 97.7 °F (36.5 °C)   SpO2: 95%         CONSTITUTIONAL:    Awake, alert, cooperative, no apparent distress, and appears stated age    EYES:     EOMI, sclera clear without icterus, conjunctiva normal    ENT:    Normocephalic, atraumatic,  External ears without lesions. NECK:    Supple, symmetrical, trachea midline, , no JVD    HEMATOLOGIC/LYMPHATICS:    No cervical lymphadenopathy and no supraclavicular lymphadenopathy    LUNGS:    Symmetric.  No increased work of breathing, good air exchange, clear to auscultation bilaterally, no wheezes, rhonchi, or rales,     CARDIOVASCULAR:    Normal apical impulse, regular rate and rhythm, normal S1 and S2, no S3 or S4, and no murmur noted    ABDOMEN:     soft, above medical record and medical decision making on the date of service and I agree with all pertinent clinical formation unless otherwise noted.     Harriet Pham D.O., F.A.C.O.I.  5/27/2021  9:59 AM

## 2021-05-26 NOTE — OP NOTE
Operative Note      Patient: Rodrigo Meza  YOB: 1965  MRN: [id number]    Date of Procedure: 5/26/2021    Pre-Op Diagnosis: RIGHT KNEE DJD    Post-Op Diagnosis: Same       Procedure(s):  RIGHT KNEE TOTAL KNEE ARTHROPLASTY [facility] NEPHEW)    Surgeon(s): Mckenzie Quintana DO    Assistant:   Resident: Colt Bowen DO; Caprice Marroquin DO; John Sierra DO    Anesthesia: Spinal    Estimated Blood Loss (mL): Minimal    Complications: None    Specimens:   ID Type Source Tests Collected by Time Destination   A : BONE RIGHT KNEE Tissue Tissue SURGICAL [address] 5/26/2021 1251        Implants:  Implant Name Type Inv. Item Serial No.  Lot No. LRB No. Used Action   CEMENT BNE 40 GM RADIOPAQUE BA SIMPLEX P Cement CEMENT BNE 40 GM RADIOPAQUE BA SIMPLEX P  [employer] [facility] [id number] Right 1 Implanted   COMPONENT FEM SZ 6 R KNEE OXINIUM POST STBL VINCE LEGION PS  COMPONENT FEM SZ 6 R KNEE OXINIUM POST STBL VINCE LEGION PS  [employer]- [id number] Right 1 Implanted   BASEPLATE TIB SZ 4 [id number] ML71MM THK2. 3MM R KNEE TI ALLY NP  BASEPLATE TIB SZ 4 [id number] ML71MM THK2. 3MM R KNEE TI ALLY NP  [facility] [id number] Right 1 Implanted   INSERT TIB SZ 3-4 [id number] KNEE XLPE POST STBL HI FLX LEGION  INSERT TIB SZ 3-4 [id number] KNEE XLPE POST STBL HI FLX LEGION  [facility] AND [facility] [employer]THOPAEDICS- [id number] Right 1 Implanted   COMPONENT PAT [id number] THK7. 5MM KNEE POLY RND RESURF GEN II  COMPONENT PAT [id number] THK7. 5MM KNEE POLY RND RESURF GEN II  [employer] [id number] Right 1 Implanted         Drains: * No LDAs found *    Findings: as above    Detailed Description of Procedure:   below    Department of Orthopedic Surgery  Operative Report        Pre-operative Diagnosis:  Right Knee Osteoarthritis    Post-operative Diagnosis:  Right Knee Osteoarthritis    Procedure:  Right Knee Arthroplasty    Surgeon:  Mckenzie Quintana DO make the distal femoral cut, discarding the pieces of cut femoral bone and sent for study. The posterior reference guide was then placed on the distal femur after the intramedullary guide and the distal femoral cutting guide were then removed. The posterior referencing guide was then pinned in appropriate position. Kael wing was used to confirm appropriate femoral trial size according to pre-operative templating. Posterior reference guide was then removed. An appropriate sized 4-in-1 cutting guide was applied to the distal femur. Oscillating saw was used to make the anterior, posterior, and chamfer cuts. The 4-in-1 cutting guide was then removed. Attention was turned to the tibia. Intramedullary drilling was then performed of the proximal tibia. The intramedullary guide with the proximal tibial cutting guide was then placed on the tibia. Proximal tibial cutting guide was then pinned in appropriate position. After pinning the proximal tibial cutting guide in appropriate position, oscillating saw was then used to make the proximal tibial cut. The guide was then removed. The proximal tibia that was cut was sharply excised and removed. At this time, all osteophytes on the proximal tibia were then removed with a rongeur. Tensiometer was then placed in extension and flexion, confirming appropriate ligamentous balancing. The box-cutting guide for the posterior-stabilized knee was then placed on the distal femur. The box was then cut in the distal femur without complication. Box-cutting guide was then removed. An appropriately sized trial tibial baseplate and a polyethylene component trial were then placed into the knee. The appropriately sized femur trial component was then placed. The knee was reduced and taken through a range of motion and found to be appropriately stable. Half pins were then placed in the tibial base plate confirming position in preparation for keel punch. The patella was then prepared.   The patella surface was free hand cut for the appropriate size implant. The patella holes were drilled and the patella was then trialed. There was good alignment and tracking of the patella. Distal femoral component was removed, trial poly was then removed. Keel punch was then performed of the proximal tibial. Tibial base plate was then removed. Copious irrigation was performed of the wound and final inspection for all interposed soft tissue and loose bodies was then performed as cement was being mixed. Copious irrigation was performed once again of the knee. Cement was placed on the proximal tibial component and the tibial component was then impacted into appropriate position with all excess extruded cement being removed with [place] elevator. A polyethylene component was then impacted into appropriate position and checked for stability, which it was stable. Cement was then placed on the distal femoral component. Distal femoral component was then impacted in appropriate position with all excess extruded cement being removed with a [place] elevator. The knee was extended, taken through a range of motion, and found to be appropriately stable. Final inspection for all excess extruded cement was then performed. [place] elevator removed all excess extruded cement. Copious irrigation was performed of the knee. The knee was then soaked with a bedadine solution soak for 3 minutes. The knee was then throughly irrigated with saline solution. Then 1 gram of vanomycin powder was placed within the wound. The capsule was  then closed with strata-fix closure. I then injected our TXA mixture into the knee joint. Copious irrigation was performed of this layer followed by, 2-0 Vicryl for the subcutaneous tissues, and zip line was used to secure incision. A sterile layered dressing was placed over the wound. Tourniquet was deflated.  The patient was awakened from anesthesia, transferred to the hospital bed, and taken to the PACU in stable

## 2021-05-26 NOTE — PROGRESS NOTES
Physical Therapy    Physical Therapy Initial Evaluation/Plan of Care    Room #:  0331/0331-01  Patient Name: Martha Rousseau  YOB: 1965  MRN: [id number]    Date of Service: 5/26/2021      Tentative placement recommendation: [address] [facility] 5 days a week   Equipment recommendation: Patient has needed equipment       Evaluating Physical Therapist: Kannan Sanders  #[id number]      Specific Provider Orders/Date/Referring Provider :  05/26/21 [address] Watts, DO    PT eval and treat (PT/OT CONSULT PANEL) ONE TIME Complete Discontinue   Question: Reason for PT?  Answer: post op total joint replacment         Admitting Diagnosis:   Primary osteoarthritis of right knee [M17.11]   Visit diagnosis:   Visit Diagnoses       Codes    Status post total right knee replacement     Z96.651    History of surgery     Z98.890        Surgery:   Right Total knee arthroplasty (TKA)    Patient Active Problem List   Diagnosis    Primary osteoarthritis of right knee    Primary osteoarthritis of left knee        ASSESSMENT of current deficits: Patient exhibits decreased strength, balance, endurance and range of motion impairing functional mobility, transfers, gait , gait distance and tolerance to activity are barriers to d/c and require skilled intervention during hospital stay   ; pt with audible \"pop\" during standing and d/t spinal and block expressed no pain; skilled intervention for strengthening and motor control to impact independent nobility required to be self sufficient at home       PHYSICAL THERAPY  PLAN OF CARE       Physical therapy plan of care is established based on physician order,  patient diagnosis and clinical assessment    Current Treatment Recommendations:    -Standing Balance: Perform strengthening exercises in standing to promote motor control with or without upper extremity support  and Instruct patient on adequate base of support to maintain balance  -Transfers: Provide instruction on proper hand and foot position for adequate transfer of weight onto lower extremities and use of gait device, Cues for hand placement, technique and safety, Facilitate weight shift forward on to lower extremities and provide necessary stabilization of bilateral lower extremities  and Provide stabilization to prevent fall   -Gait: Gait training, Standing activities to improve: base of support, weight shift, weight bearing , Exercises to improve hip and knee control and Pregait training to emphasize: right knee extension in stance phase of gait   -Endurance: Utilize Supervised activities to increase level of endurance to allow for safe functional mobility including transfers and gait   -Stairs: Stair training with instruction on proper technique and hand placement on rail    PT long term treatment goals are located in below grid    Patient and or family understand(s) diagnosis, prognosis, and plan of care. Frequency of treatments: Patient will be seen  twice daily  for therapeutic exercise, functional retraining, endurance activities, balance exercises, family and patient education.          Prior Level of Function: Patient ambulated independently    Rehab Potential: good    for baseline    Past medical history:   Past Medical History:   Diagnosis Date    Arthritis     Depression     HTN (hypertension)     Seasonal allergies      Past Surgical History:   Procedure Laterality Date    CHOLECYSTECTOMY      COLONOSCOPY      DILATION AND CURETTAGE OF UTERUS      TONSILLECTOMY           SUBJECTIVE:    Precautions:  , falls and alarm , Right FWB (full weight bearing)     Social history: Patient lives with spouse   in a ranch home  with 3 steps  to enter with Rail  Tub shower grab bars    Equipment owned: Cane and [address],  Bedside commode     [facility]   How much difficulty turning over in bed?: A Little  How much difficulty sitting down on / standing up from a chair with arms?: A Little  How much difficulty moving from lying on back to sitting on side of bed?: A Little  How much help from another person moving to and from a bed to a chair?: Total  How much help from another person needed to walk in hospital room?: Total  How much help from another person for climbing 3-5 steps with a railing?: Total  AM-PAC Inpatient Mobility Raw Score : 12  AM-PAC Inpatient T-Scale Score : 35.33  Mobility Inpatient CMS 0-100% Score: 68.66  Mobility Inpatient CMS G-Code Modifier : CL    Nursing cleared patient for PT evaluation. The admitting diagnosis and active problem list as listed above have been reviewed prior to the initiation of this evaluation. OBJECTIVE:   Initial Evaluation  Date: 5/26/2021 Treatment Date: Short Term/ Long Term   Goals   Was pt agreeable to Eval/treatment? Yes     Pain Level 0  /10  Right knee  Spinal intact      Bed Mobility  Rolling: Minimal assist of 1    Supine to sit: Minimal assist of 1    Sit to supine: Minimal assist of 1    Scooting: Minimal assist of 1    Rolling: Independent    Supine to sit: Independent    Sit to supine: Independent    Scooting: Independent     Transfers Sit to stand:  Moderate assist of 1 x 2 reps   Sit to stand: Modified Independent     Ambulation    not assessed poor motor control right knee in standing this session  100 feet using  wheeled walker with Modified Independent    Stair negotiation: ascended and descended   Not assessed       3 steps with bilateral rails supervision    ROM Within functional limits except right lower extremity knee 5-90°      Increase range of motion 10% of affected joints    Strength Within functional limits  except  Right lower extremity 3/5  Within functional limits   Balance Sitting EOB:  good     Dynamic Standing:  poor  wheeled walker   Sitting EOB:  good    Dynamic Standing:  good wheeled walker      Patient is Alert & Oriented x person, place, time and situation and follows directions    Sensation: Patient reports numbness bilateral lower extremities  Edema:  yes right lower extremity         Patient education  Patient educated on role of Physical Therapy, risks of immobility, safety and plan of care and  importance of mobility while in hospital       Patient response to education:   Pt verbalized understanding Pt demonstrated skill Pt requires further education in this area   Yes Partial Yes       Treatment:  Patient practiced and was instructed in the following treatment:     Therapist educated and facilitated patient on techniques to increase safety and independence with bed mobility, balance, functional transfers, and functional mobility. Instruction knee extension in bed with kneecap and toes pointing to ceiling  Instruction and performance of incentive inspirometer. Patient performed ankle pumps, quad sets, glut sets x 15-20 each. Active assist heelslide, hip abduction/adduction, straight leg raise x 10 each    Sat edge of bed 15 minutes with Supervision  to increase dynamic sitting balance and activity tolerance. At end of session, patient in bed with alarm call light and phone within reach,   all lines and tubes intact, nursing notified. Patient would benefit from continued skilled Physical Therapy to improve functional independence and quality of life. Patient's/ family goals   home        Patient and or family understand(s) diagnosis, prognosis, and plan of care. Frequency of treatments: Patient will be seen  twice daily  for therapeutic exercise, functional retraining, endurance activities, balance exercises, family and patient education.        Time in  513  Time out  556    Total Treatment Time  23 minutes    Evaluation time includes thorough review of current medical information, gathering information on past medical history/social history and prior level of function, completion of standardized testing/informal observation of tasks, assessment of data, and development

## 2021-05-26 NOTE — H&P
Updated H&P    Chief Complaint   Patient presents with    Knee Pain       Bilatearl Knee F/U, finished Synvisc on 12/29/2020 with minimal relief, has tried PT and cortisone injections with minimal relief.         Thalia Milligan returns today for follow-up of her bilateral knee pain. she reports this is worse than when I saw her last.  The patient's pain level is a 8-9/10.  The previous treatment was not successful.     Past Medical History        Past Medical History:   Diagnosis Date    Depression      HTN (hypertension)           Past Surgical History         Past Surgical History:   Procedure Laterality Date    CHOLECYSTECTOMY        DILATION AND CURETTAGE OF UTERUS        TONSILLECTOMY               Current Medication      Current Outpatient Medications:     Elastic Bandages & Supports (KNEE BRACE ADJUSTABLE HINGED) MISC, Wear brace when active, Disp: 1 each, Rfl: 0    KLOR-CON M20 20 MEQ extended release tablet, , Disp: , Rfl:     atenolol-chlorthalidone (TENORETIC) 50-25 MG per tablet, , Disp: , Rfl:     citalopram (CELEXA) 40 MG tablet, Take 40 mg by mouth daily, Disp: , Rfl:     celecoxib (CELEBREX) 200 MG capsule, Take 1 capsule by mouth daily, Disp: 30 capsule, Rfl: 3          Allergies   Allergen Reactions    Ace Inhibitors Other (See Comments)    Pcn [Penicillins]        Social History               Socioeconomic History    Marital status:        Spouse name: Not on file    Number of children: Not on file    Years of education: Not on file    Highest education level: Not on file   Occupational History    Not on file   Social Needs    Financial resource strain: Not on file    Food insecurity       Worry: Not on file       Inability: Not on file    Transportation needs       Medical: Not on file       Non-medical: Not on file   Tobacco Use    Smoking status: Never Smoker    Smokeless tobacco: Never Used   Substance and Sexual Activity    Alcohol use: No    Drug use: Never  Sexual activity: Not on file   Lifestyle    Physical activity       Days per week: Not on file       Minutes per session: Not on file    Stress: Not on file   Relationships    Social connections       Talks on phone: Not on file       Gets together: Not on file       Attends Islam service: Not on file       Active member of club or organization: Not on file       Attends meetings of clubs or organizations: Not on file       Relationship status: Not on file    Intimate partner violence       Fear of current or ex partner: Not on file       Emotionally abused: Not on file       Physically abused: Not on file       Forced sexual activity: Not on file   Other Topics Concern    Not on file   Social History Narrative    Not on file         Family History         Family History   Problem Relation Age of Onset    Diabetes Mother      Hypertension Mother      High Cholesterol Father              Review of Systems:      Skin: (-) rash,(-) psoriasis,(-) eczema, (-)skin cancer. Musculoskeletal: (-) fractures,  (-) dislocations,(-) collagen vascular disease, (-) fibromyalgia, (-) multiple sclerosis, (-) muscular dystrophy, (-) RSD,(-) joint pain (-)swelling, (-) joint pain,swelling. Neurologic: (-) epilepsy, (-)seizures,(-) brain tumor,(-) TIA, (-)stroke, (-)headaches, (-)Parkinson disease,(-) memory loss, (-) LOC.   Cardiovascular: (-) Chest pain, (-) swelling in legs/feet, (-) SOB, (-) cramping in legs/feet with walking.     Constitutional:     Vital signs are stable.  In general, patient is awake, alert and oriented X3, in no apparent distress.  Examination of HENT reveals normocephalic, atraumatic.  PERRLA/EOMI sclera are white.  Conjunctivae are clear.  TM's are intact.  Pharynx is pink and moist.  Uvula and tongue are midline.  Heart: Positive S1 and positive S2 with regular rate and rhythm.  Lungs: Clear to auscultation bilaterally without rales, rhonchi or wheezes.  Abdomen: soft, nontender.  Positive bowel sounds.  No organomegaly.  No guarding or rigidity.        The patient is alert and oriented x 3, appears to be stated age and in no distress. /78   Pulse 64   Temp 99.6 °F (37.6 °C) (Infrared)   Resp 16   SpO2 95%        Skin:  Upon inspection: the skin appears warm, dry and intact. There is not a previous scar over the affected area. There is not any cellulitis, lymphedema or cutaneous lesions noted in the lower extremities. Upon palpation there is no induration noted.       Neurologic:  Gait: normal;  Motor exam of the lower extremities show ; quadriceps, hamstrings, foot dorsi and plantar flexors intact R.  5/5 and L. 5/5. Deep tendon reflexes are 2/4 at the knees and 2/4 at the ankles with strong extensor hallicus longus motor strength bilaterally. Sensory to both feet is intact to all sensory roots.     Cardiovascular: The vascular exam is normal and is well perfused to distal extremities. Distal pulses DP/PT: R. 2+; L. 2+. There is cap refill noted less than two seconds in all digits. There is not edema of the bilateral lower extremities. There is not varicosities noted in the distal extremities.       Lymph:  Upon palpation,  there is no lymphadenopathy noted in bilateral lower extremities.       Musculoskeletal:  Gait: antalgic; examination of the nails and digits reveal no cyanosis or clubbing     Lumbar exam:  On visual inspection, there is no deformity of the spine. full range of motion, no tenderness, palpable spasm or pain on motion. Special tests: Straight Leg Raise negative, Rubén testnegative.     Hip exam:  Upon inspection, there is no deformity noted. Upon palpation there is not tenderness. ROM: is   full and semetrical.   Strength: Hip Flexors 5/5; Hip Abductors 5/5; Hip Adduction 5/5.      Knee exam:  Bilateral knee exam shows;  range of motion of R. Knee is 5 to 115, and L. Knee is 5 to 115.  She does have  pain on motion, effusion is mild, there is tenderness over the medial region, there are not any masses, there is not ligamentous instability, there is  Varus deformity noted. Knee exam: bilateral positive for moderate crepitations, some mild tenderness laxity is not noted with  stress.       R. Knee:  Lachman's negative, Anterior Drawer negative, Posterior Drawer negative  Tigre's positive, Thallasy  positive,   PF grind test positive, Apprehension test negative, Patellar J sign  negative  L. Knee:  Lachman's negative, Anterior Drawer negative, Posterior Drawer negative  Tigre's positive, Thallasy  positive,   PF grind test positive, Apprehension test negative,  Patellar J sign  negative     Xrays:   Right knee demonstrates grade 2/3 osteoarthritis in the medial compartment. Grade 1/2 patellofemoral osteoarthritis.       Left knee grade 4 osteoarthritis in the medial compartment.                MRI:   n/a  Radiographic findings reviewed with patient     Impression:       Encounter Diagnoses   Name Primary?  Primary osteoarthritis of right knee Yes    Primary osteoarthritis of left knee           Plan:   Natural history and expected course discussed. Questions answered. Educational materials distributed. Rest, ice, compression, and elevation (RICE) therapy. Reduction in offending activity. Patellar compression sleeve. I had a lengthy discussion with the patient regarding their diagnosis. I explained treatment options including surgical vs non surgical treatment. I reviewed in detail the risks and benefits and outlined the procedure in detail with expected outcomes and possible complications. I also discussed non surgical treatment such as injections (CSI and visco supplementation), physical therapy, topical creams and NSAID's. They have elected for surgical management at this time.    We discussed various treatment options both surgical and non-surgical.   The patient is unable to ambulate more than 100 feet and is unable to perform the average daily

## 2021-05-27 VITALS
OXYGEN SATURATION: 97 % | TEMPERATURE: 98.1 F | RESPIRATION RATE: 18 BRPM | HEART RATE: 76 BPM | SYSTOLIC BLOOD PRESSURE: 133 MMHG | DIASTOLIC BLOOD PRESSURE: 64 MMHG

## 2021-05-27 LAB
ANION GAP SERPL CALCULATED.3IONS-SCNC: 9 MMOL/L (ref 7–16)
BUN BLDV-MCNC: 13 MG/DL (ref 6–20)
CALCIUM SERPL-MCNC: 8.9 MG/DL (ref 8.6–10.2)
CHLORIDE BLD-SCNC: 99 MMOL/L (ref 98–107)
CO2: 28 MMOL/L (ref 22–29)
CREAT SERPL-MCNC: 0.8 MG/DL (ref 0.5–1)
EKG ATRIAL RATE: 69 BPM
EKG P AXIS: 61 DEGREES
EKG P-R INTERVAL: 190 MS
EKG Q-T INTERVAL: 434 MS
EKG QRS DURATION: 92 MS
EKG QTC CALCULATION (BAZETT): 465 MS
EKG R AXIS: 47 DEGREES
EKG T AXIS: 35 DEGREES
EKG VENTRICULAR RATE: 69 BPM
GFR AFRICAN AMERICAN: >60
GFR NON-AFRICAN AMERICAN: >60 ML/MIN/1.73
GLUCOSE BLD-MCNC: 116 MG/DL (ref 74–99)
HBA1C MFR BLD: 6.4 % (ref 4–5.6)
HCT VFR BLD CALC: 36.9 % (ref 34–48)
HEMOGLOBIN: 11.6 G/DL (ref 11.5–15.5)
MAGNESIUM: 2.4 MG/DL (ref 1.6–2.6)
PHOSPHORUS: 3.5 MG/DL (ref 2.5–4.5)
POTASSIUM SERPL-SCNC: 3.8 MMOL/L (ref 3.5–5)
SODIUM BLD-SCNC: 136 MMOL/L (ref 132–146)

## 2021-05-27 PROCEDURE — 97116 GAIT TRAINING THERAPY: CPT

## 2021-05-27 PROCEDURE — 93005 ELECTROCARDIOGRAM TRACING: CPT | Performed by: INTERNAL MEDICINE

## 2021-05-27 PROCEDURE — 97110 THERAPEUTIC EXERCISES: CPT

## 2021-05-27 PROCEDURE — 6370000000 HC RX 637 (ALT 250 FOR IP): Performed by: ORTHOPAEDIC SURGERY

## 2021-05-27 PROCEDURE — G0378 HOSPITAL OBSERVATION PER HR: HCPCS

## 2021-05-27 PROCEDURE — 2580000003 HC RX 258: Performed by: STUDENT IN AN ORGANIZED HEALTH CARE EDUCATION/TRAINING PROGRAM

## 2021-05-27 PROCEDURE — 83036 HEMOGLOBIN GLYCOSYLATED A1C: CPT

## 2021-05-27 PROCEDURE — 83735 ASSAY OF MAGNESIUM: CPT

## 2021-05-27 PROCEDURE — 36415 COLL VENOUS BLD VENIPUNCTURE: CPT

## 2021-05-27 PROCEDURE — 84100 ASSAY OF PHOSPHORUS: CPT

## 2021-05-27 PROCEDURE — 80048 BASIC METABOLIC PNL TOTAL CA: CPT

## 2021-05-27 PROCEDURE — 97530 THERAPEUTIC ACTIVITIES: CPT

## 2021-05-27 PROCEDURE — 6360000002 HC RX W HCPCS: Performed by: STUDENT IN AN ORGANIZED HEALTH CARE EDUCATION/TRAINING PROGRAM

## 2021-05-27 PROCEDURE — 97165 OT EVAL LOW COMPLEX 30 MIN: CPT

## 2021-05-27 PROCEDURE — 97535 SELF CARE MNGMENT TRAINING: CPT

## 2021-05-27 PROCEDURE — 85018 HEMOGLOBIN: CPT

## 2021-05-27 PROCEDURE — 85014 HEMATOCRIT: CPT

## 2021-05-27 RX ADMIN — CELECOXIB 200 MG: 100 CAPSULE ORAL at 11:08

## 2021-05-27 RX ADMIN — ASPIRIN 325 MG: 325 TABLET, COATED ORAL at 08:17

## 2021-05-27 RX ADMIN — VANCOMYCIN HYDROCHLORIDE 1000 MG: 1 INJECTION, POWDER, LYOPHILIZED, FOR SOLUTION INTRAVENOUS at 02:37

## 2021-05-27 RX ADMIN — OXYCODONE HYDROCHLORIDE AND ACETAMINOPHEN 1 TABLET: 10; 325 TABLET ORAL at 15:24

## 2021-05-27 RX ADMIN — ACETAMINOPHEN 650 MG: 325 TABLET ORAL at 08:17

## 2021-05-27 RX ADMIN — GABAPENTIN 100 MG: 100 CAPSULE ORAL at 11:08

## 2021-05-27 RX ADMIN — OXYCODONE HYDROCHLORIDE AND ACETAMINOPHEN 1 TABLET: 10; 325 TABLET ORAL at 11:08

## 2021-05-27 RX ADMIN — GABAPENTIN 100 MG: 100 CAPSULE ORAL at 05:56

## 2021-05-27 RX ADMIN — OXYCODONE HYDROCHLORIDE AND ACETAMINOPHEN 1 TABLET: 10; 325 TABLET ORAL at 05:55

## 2021-05-27 RX ADMIN — DOCUSATE SODIUM 100 MG: 100 CAPSULE, LIQUID FILLED ORAL at 08:17

## 2021-05-27 ASSESSMENT — PAIN SCALES - GENERAL
PAINLEVEL_OUTOF10: 5
PAINLEVEL_OUTOF10: 7
PAINLEVEL_OUTOF10: 5
PAINLEVEL_OUTOF10: 6

## 2021-05-27 ASSESSMENT — PAIN DESCRIPTION - DESCRIPTORS
DESCRIPTORS: ACHING;DISCOMFORT
DESCRIPTORS: ACHING;DISCOMFORT;SORE

## 2021-05-27 ASSESSMENT — PAIN DESCRIPTION - LOCATION
LOCATION: KNEE
LOCATION: KNEE

## 2021-05-27 ASSESSMENT — PAIN DESCRIPTION - ORIENTATION
ORIENTATION: RIGHT
ORIENTATION: RIGHT

## 2021-05-27 ASSESSMENT — PAIN DESCRIPTION - PAIN TYPE
TYPE: SURGICAL PAIN
TYPE: SURGICAL PAIN

## 2021-05-27 NOTE — PLAN OF CARE
Problem: Falls - Risk of:  Goal: Will remain free from falls  Description: Will remain free from falls  Outcome: Met This Shift     Problem: Pain:  Goal: Pain level will decrease  Description: Pain level will decrease  Outcome: Met This Shift     Problem: Pain:  Goal: Control of acute pain  Description: Control of acute pain  Outcome: Met This Shift

## 2021-05-27 NOTE — CARE COORDINATION
SS NOTE: SW met with pt today. Pt resides with her  in a 1floor plan with 3 step entry with bilateral railings. Pt's  will be home with pt and see to her needs after dch. Pt was I pta with adls iadls and driving pta. Pt has a ww at home only. She relates that if other DME is needed her [employer] has a collection of DME and they will provide the extended tub bench if recommended. Pt agrees with [facility] and they have her orders and will begin seeing her at home tomorrow. Pt's PCP is Dr Nav Morris and her pharmacy is [facility]. SS will continue as needed. ZACHARIAH Orlando.5/27/2021.10:46 AM.

## 2021-05-27 NOTE — PROGRESS NOTES
Father        HOME MEDICATIONS:  Prior to Admission medications    Medication Sig Start Date End Date Taking? Authorizing Provider   aspirin 325 MG EC tablet Take 1 tablet by mouth 2 times daily 5/26/21 5/26/22 Yes Bryon Areas, DO   oxyCODONE-acetaminophen (PERCOCET) 5-325 MG per tablet Take 1 tablet by mouth every 6 hours as needed for Pain for up to 7 days. Intended supply: 7 days. Take lowest dose possible to manage pain 5/26/21 6/2/21 Yes Bryon Areas, DO   celecoxib (CELEBREX) 100 MG capsule Take 1 capsule by mouth daily 5/26/21 6/25/21 Yes Bryon Areas, DO   gabapentin (NEURONTIN) 100 MG capsule Take 1 capsule by mouth 3 times daily for 30 days.  5/26/21 6/25/21 Yes Baker Areas, DO   cetirizine (ZYRTEC) 10 MG tablet Take 10 mg by mouth daily   Yes Historical Provider, MD   KLOR-CON M20 20 MEQ extended release tablet Take 40 mEq by mouth daily  3/29/18  Yes Historical Provider, MD   atenolol-chlorthalidone (TENORETIC) 50-25 MG per tablet Take 1 tablet by mouth nightly  5/16/18  Yes Historical Provider, MD   citalopram (CELEXA) 40 MG tablet Take 40 mg by mouth nightly    Yes Historical Provider, MD   Elastic Bandages & Supports ([address]) [address] [facility] Wear brace when active 12/3/19   Mckinleyi Lorman, DO       ALLERGIES:  Ace inhibitors, Adhesive tape, and Pcn [penicillins]    SOCIAL Hx:  Social History     Socioeconomic History    Marital status:      Spouse name: Not on file    Number of children: Not on file    Years of education: Not on file    Highest education level: Not on file   Occupational History    Not on file   Tobacco Use    Smoking status: Never Smoker    Smokeless tobacco: Never Used   Vaping Use    Vaping Use: Never used   Substance and Sexual Activity    Alcohol use: No    Drug use: Never    Sexual activity: Not on file   Other Topics Concern    Not on file   Social History Narrative    Not on file     Social Determinants of Health     Financial Resource Strain:     Difficulty of Paying Living Expenses:    Food Insecurity:     Worried About Running Out of Food in the Last Year:     [address] in the Last Year:    Transportation Needs:     Lack of Transportation (Medical):  Lack of Transportation (Non-Medical):    Physical Activity:     Days of Exercise per Week:     Minutes of Exercise per Session:    Stress:     Feeling of Stress :    Social Connections:     Frequency of Communication with Friends and Family:     Frequency of Social Gatherings with Friends and Family:     Attends Religion Services:     Active Member of Clubs or Organizations:     Attends Club or Organization Meetings:     Marital Status:    Intimate Partner Violence:     Fear of Current or Ex-Partner:     Emotionally Abused:     Physically Abused:     Sexually Abused:        ROS: Positive in bold  General:   Denies chills, fatigue, fever, malaise, night sweats or weight loss    Psychological:   Denies anxiety, disorientation or hallucinations    ENT:    Denies epistaxis, headaches, vertigo or visual changes    Cardiovascular:   Denies any chest pain, irregular heartbeats, or palpitations. No paroxysmal nocturnal dyspnea. Respiratory:   Denies shortness of breath, coughing, sputum production, hemoptysis, or wheezing. No orthopnea. Gastrointestinal:   Denies nausea, vomiting, diarrhea, or constipation. Denies any abdominal pain. Denies change in bowel habits or stools. Genito-Urinary:    Denies any urgency, frequency, hematuria. Voiding without difficulty. Musculoskeletal:   Denies joint pain, joint stiffness, joint swelling or muscle pain    Neurology:    Denies any headache or focal neurological deficits. No weakness or paresthesia. Derm:    Denies any rashes, ulcers, or excoriations. Denies bruising. Extremities:   Denies any lower extremity swelling or edema.       PHYSICAL EXAM: Abnormal findings noted  VITALS:  Vitals:    05/27/21 0550   BP: 116/64   Pulse: 73   Resp: 20   Temp: 97.9 °F (36.6 °C)   SpO2: 94%         CONSTITUTIONAL:    Awake, alert, cooperative, no apparent distress, and appears stated age    EYES:     EOMI, sclera clear without icterus, conjunctiva normal    ENT:    Normocephalic, atraumatic,  External ears without lesions. NECK:    Supple, symmetrical, trachea midline, , no JVD    HEMATOLOGIC/LYMPHATICS:    No cervical lymphadenopathy and no supraclavicular lymphadenopathy    LUNGS:    Symmetric. No increased work of breathing, good air exchange, clear to auscultation bilaterally, no wheezes, rhonchi, or rales,     CARDIOVASCULAR:    Normal apical impulse, regular rate and rhythm, normal S1 and S2, no S3 or S4, and no murmur noted    ABDOMEN:     soft, non-distended, non-tender    MUSCULOSKELETAL:    Dressing in place over right knee      NEUROLOGIC:    Awake, alert, oriented to name, place and time. SKIN:    No bruising or bleeding. No redness, warmth, or swelling    EXTREMITIES:    Peripheral pulses present. No edema, cyanosis, or swelling.     LINES/CATHETERS     LABORATORY DATA:  CBC with Differential:    Lab Results   Component Value Date    WBC 10.6 05/26/2021    RBC 3.91 05/26/2021    HGB 11.6 05/27/2021    HCT 36.9 05/27/2021     05/26/2021    MCV 94.9 05/26/2021    MCH 30.7 05/26/2021    MCHC 32.3 05/26/2021    RDW 13.1 05/26/2021    LYMPHOPCT 7.1 05/26/2021    MONOPCT 0.6 05/26/2021    BASOPCT 0.2 05/26/2021    MONOSABS 0.06 05/26/2021    LYMPHSABS 0.75 05/26/2021    EOSABS 0.00 05/26/2021    BASOSABS 0.02 05/26/2021     CMP:    Lab Results   Component Value Date     05/27/2021    K 3.8 05/27/2021    CL 99 05/27/2021    CO2 28 05/27/2021    BUN 13 05/27/2021    CREATININE 0.8 05/27/2021    GFRAA >60 05/27/2021    LABGLOM >60 05/27/2021    GLUCOSE 116 05/27/2021    PROT 7.1 05/26/2021    LABALBU 4.0 05/26/2021    CALCIUM 8.9 05/27/2021    BILITOT 0.4 05/26/2021    ALKPHOS 63 05/26/2021    AST 27 05/26/2021    ALT 24 05/26/2021       ASSESSMENT/PLAN:  1. Status post right total knee arthroplasty  2. History of cholecystectomy  3. Hypertension  4. Depression  5. Arthritis  6. Hyperglycemia    Patient is status post right total knee replacement. Pain is controlled. No acute complaints currently. She did exhibit hypotension. Antihypertensive medications held. Routine blood work ordered. Diet and activity per orthopedic surgery.     Hemoglobin A1c now  Discharge home when okay with orthopedics - patient follow-up primary care physician as directed      Josi Rodríguez DO  9:19 AM  5/27/2021

## 2021-05-27 NOTE — PROGRESS NOTES
OT BEDSIDE TREATMENT NOTE      Date:2021  Patient Name: Glenn Siegel  MRN: [id number]  : 1965  Room: [address] [place] [address]     Evaluating OT: Eugene Herring OTR/MARISA #[id number]     Referring Provider: Davis Bautista DO  Specific Provider Orders/Date:     OT eval and treat Start: 21 1115, End: 21 1115, ONE TIME, Standing Count: 1 Occurrences, R           Recommended Placement: Home with HHOT     Diagnosis:   1. Primary osteoarthritis of right knee    2. Status post total right knee replacement    3. History of surgery       Surgery: 21 - Procedure(s):  RIGHT KNEE TOTAL KNEE ARTHROPLASTY [facility] & [facility])     Pertinent Medical History:       Past Medical History        Past Medical History:   Diagnosis Date    Arthritis      Depression      HTN (hypertension)      Seasonal allergies              Past Surgical History   Past Surgical History:   Procedure Laterality Date    CHOLECYSTECTOMY        COLONOSCOPY        DILATION AND CURETTAGE OF UTERUS        TONSILLECTOMY                Precautions:  Fall Risk, R TKA, WBAT      Assessment of current deficits    []? Functional mobility            [x]?ADLs           [x]? Strength                   [x]? Cognition    [x]? Functional transfers          [x]? IADLs         [x]? Safety Awareness   [x]? Endurance    [x]? Fine Coordination              [x]? Balance      []? Vision/perception    [x]? Sensation      []? Gross Motor Coordination  []? ROM           []?  Delirium                   []? Motor Control      OT PLAN OF CARE   OT POC based on physician orders, patient diagnosis and results of clinical assessment     Frequency/Duration 1-3 days/wk for 2 weeks PRN   Specific OT Treatment Interventions to include:   Instruction/training on adapted ADL techniques and AE recommendations to increase functional independence within precautions  Training on energy conservation strategies, correct breathing pattern and techniques to improve independence/tolerance for self-care routine  Functional transfer/mobility training/DME recommendations for increased independence, safety, and fall prevention  Patient/Family education to increase follow through with safety techniques and functional independence  Recommendation of environmental modifications for increased safety with functional transfers/mobility and ADLs  Therapeutic exercise to improve motor endurance, ROM, and functional strength for ADLs/functional transfers  Therapeutic activities to facilitate/challenge dynamic balance, stand tolerance for increased safety and independence with ADLs  Therapeutic activities to facilitate gross/fine motor skills for increased independence with ADLs  Neuro-muscular re-education: facilitation of righting/equilibrium reactions, midline orientation, scapular stability/mobility, normalization of muscle tone, and facilitation of volitional active controled movement  Positioning to improve skin integrity, interaction with environment and functional independence     Recommended Adaptive Equipment: Tub bench      Home Living: Pt lives with spouse in a 1 story home, 3 MEG with 1 rail(s).   Bathroom setup: tub/shower  Equipment owned:  brandon Royal, EKTA     Prior Level of Function: Independent with ADLs , Independent with IADLs; ambulated without AD  Driving: Yes  Occupation: Not reported     Pain Level: no c/o pain at this time  Cognition: A&O: 4/4; Follows 2 step directions              Memory:  good              Sequencing:  good              Problem solving:  good              Judgement/safety:  good                Functional Assessment:  AM-PAC Daily Activity Raw Score: 19/24    Initial Eval Status  Date: 5/27/21 Treatment Status  Date:5/27/2021 STGs = LTGs  Time frame: 10-14 days   Feeding Independent  Independent to bring cup to mouth      Grooming Stand by Assist   Standing bilateral tasks SBA when standing sinkside to wash hands after toileting x 2 reps with cuing for walker placement  Independent    UB Dressing Stand by Assist  Pt able to doff gown and don bra and shirt when seated EOB; supervision level  Independent    LB Dressing Moderate Assist  Min A/Mod A ; pt required mod A to don LINO hose with use of easy slide; pt able to don underwear and shorts over B feet when seated EOB; min A for balance as pt donned garments over B hips ; pt able to manage clothing over B hips when toileting at supervision; pt able to don shoes with use of LH shoehorn Modified [place]    Bathing Moderate Assist  Pt education, instruction, demonstration and participation with use of DME in clinic for bathroom safety/safe transfers. Min A for balance as pt stepped to /from tub with cuing for hand placement and assist for balance; pt also completed tub transfer using DME  (TTB) with pt able to support B LE's to/from tub. .     Modified [place]    Toileting Minimal Assist   Using commode in bathroom, steadying assist for hygiene and clothing management Supervision for transfer from toilet in room with cuing for hand placement; pt able to complete hygiene when seated; transfer to/from toilet in room x 2 reps with supervision ; cuing for sequencing, hand placement  Independent    Bed Mobility  Supine to sit: Stand by Assist   Sit to supine: NT     N/T; pt seated on toilet on arrival and in chair at end of session Supine to sit:  Independent   Sit to supine: Independent    Functional Transfers Minimal Assist   Sit<>stand  Bed, commode  Min A progressing to supervision for sit to stand transfers to/from toilet x 2 reps, to/from EOB, to/from w/c and multiple surfaces in clinic with FWW; cuing for walker safety, joint protection; simulated car transfer at mod I with pt able to support B LE's to/from car; transfer from w/c to toilet and from toilet to chair at supervision with FWW Independent    Functional Mobility Minimal Assist   Using [address] household distances Min A progressing to supervision with [facility] Rugraeme Booth for household distances in room and clinic; no LOB noted; pt demo's intermittent step through gait pattern  Modified [place]    Balance Sitting:     Static:  good    Dynamic:good  Standing: fair+ Sitting:     Static:  good    Dynamic:good  Standing: fair+  Sitting:     Static:  good    Dynamic:good  Standing: good   Activity Tolerance Fair+ Fair +  good   Visual/  Perceptual Glasses: Yes             Hand Dominance Right    AROM (PROM) Strength Additional Info:    RUE  WFL 4/5 good  and wfl FMC/dexterity noted during ADL tasks         LUE WFL 4/5 good  and wfl FMC/dexterity noted during ADL tasks         Hearing: WFL   Sensation:  No c/o numbness or tingling   Tone: WFL   Edema: None noted          Comments: Patient cleared by nursing staff. Upon arrival pt seated on commode. Pt agreeable to OT tx session. Pt educated with regards to  hand placement, safety awareness, static sitting balance,  standing balance, transfer training, functional mobility, ADL retraining,  grooming tasks, bathroom safety, DME, AA to don LINO hose, toileting/hygiene x 2 reps, car transfer, tub/shower transfer and toilet transfer,  LE/UE dressing, ECT's. At end of session pt seated in chair   with all lines and tubes intact, call light within reach. Overall, pt demonstrated decreased independence and safety during completion of ADL/functional transfers/mobility tasks. Pt would benefit from continued skilled OT to increase safety and independence with completion of ADL/IADL tasks for functional independence and quality of life. Pt required cues and education as noted above for safe facilitation and completion of tasks. Therapist provided skilled monitoring of patient's response during treatment session. Prior to and at the end of session, environmental modifications completed for patients safety and efficiency of treatment session. Overall, patient demonstrates minimal difficulties with completion of BADLs and IADLs.

## 2021-05-27 NOTE — PROGRESS NOTES
Department of Orthopedic Surgery  Resident Progress Note    Patient seen and examined. Pain controlled. No new complaints. Denies chest pain, shortness of breath, dizziness/lightheadedness. Doing well POD #1. Very motivated. VITALS:  /64   Pulse 73   Temp 97.9 °F (36.6 °C) (Oral)   Resp 20   SpO2 94%     General: alert and oriented to person, place and time, well-developed and well-nourished, in no acute distress    MUSCULOSKELETAL:   right lower extremity:  · Dressing C/D/I  · Compartments soft and compressible  · +PF/DF/EHL  · +2/4 DP & PT pulses, Brisk Cap refill, Toes warm and perfused  · Distal sensation grossly intact to Peroneals, Sural, Saphenous, and tibial nrs    CBC:   Lab Results   Component Value Date    WBC 10.6 05/26/2021    HGB 12.0 05/26/2021    HCT 37.1 05/26/2021     05/26/2021     PT/INR:    Lab Results   Component Value Date    PROTIME 11.8 05/21/2021    INR 1.0 05/21/2021       ASSESSMENT  · S/P R TKA - 5/26/21    PLAN      · Continue physical therapy and protocol: WBAT - RLE  · 24 hour abx coverage  · Deep venous thrombosis prophylaxis - , early mobilization  · Doing very well POD #1  · PT/OT  · Pain Control: IV and PO, wean to orals. · Monitor H&H- awaiting H and H- order in chart. · D/C Plan:  Plan on DC later today pending PT/OT, medical stability.

## 2021-05-27 NOTE — PROGRESS NOTES
Occupational Therapy  OCCUPATIONAL THERAPY INITIAL EVALUATION    [facility] [address] [facility]  [facility] [place]. [place]      Date:2021                                                Patient Name: Rodrigo Meza  MRN: [id number]  : 1965  Room: [address] [place] [address]-    Evaluating OT: Chuck Rodriguez OTR/L #[id number]    Referring Provider: Mckenzie Quintana DO  Specific Provider Orders/Date:    OT eval and treat Start: 21, End: 21 111, ONE TIME, Standing Count: 1 Occurrences, R          Recommended Placement: Home with HHOT    Diagnosis:   1. Primary osteoarthritis of right knee    2. Status post total right knee replacement    3. History of surgery      Surgery: 21 - Procedure(s):  RIGHT KNEE TOTAL KNEE ARTHROPLASTY [facility] & [facility])     Pertinent Medical History:       Past Medical History:   Diagnosis Date    Arthritis     Depression     HTN (hypertension)     Seasonal allergies          Past Surgical History:   Procedure Laterality Date    CHOLECYSTECTOMY      COLONOSCOPY      DILATION AND CURETTAGE OF UTERUS      TONSILLECTOMY         Precautions:  Fall Risk, R TKA, WBAT     Assessment of current deficits    [] Functional mobility  [x]ADLs  [x] Strength               [x]Cognition    [x] Functional transfers   [x] IADLs         [x] Safety Awareness   [x]Endurance    [x] Fine Coordination              [x] Balance      [] Vision/perception   [x]Sensation     []Gross Motor Coordination  [] ROM  [] Delirium                   [] Motor Control     OT PLAN OF CARE   OT POC based on physician orders, patient diagnosis and results of clinical assessment    Frequency/Duration 1-3 days/wk for 2 weeks PRN   Specific OT Treatment Interventions to include:    Instruction/training on adapted ADL techniques and AE recommendations to increase functional independence within precautions  Training on energy conservation strategies, correct breathing pattern and techniques to improve independence/tolerance for self-care routine  Functional transfer/mobility training/DME recommendations for increased independence, safety, and fall prevention  Patient/Family education to increase follow through with safety techniques and functional independence  Recommendation of environmental modifications for increased safety with functional transfers/mobility and ADLs  Therapeutic exercise to improve motor endurance, ROM, and functional strength for ADLs/functional transfers  Therapeutic activities to facilitate/challenge dynamic balance, stand tolerance for increased safety and independence with ADLs  Therapeutic activities to facilitate gross/fine motor skills for increased independence with ADLs  Neuro-muscular re-education: facilitation of righting/equilibrium reactions, midline orientation, scapular stability/mobility, normalization of muscle tone, and facilitation of volitional active controled movement  Positioning to improve skin integrity, interaction with environment and functional independence    Recommended Adaptive Equipment: Tub bench     Home Living: Pt lives with spouse in a 1 story home, 3 MEG with 1 rail(s).   Bathroom setup: tub/shower  Equipment owned: [employer], cane, BSC    Prior Level of Function: Independent with ADLs , Independent with IADLs; ambulated without AD  Driving: Yes  Occupation: Not reported    Pain Level: -/10  Cognition: A&O: 4/4; Follows 2 step directions   Memory:  good   Sequencing:  good   Problem solving:  good   Judgement/safety:  good     Functional Assessment:  AM-PAC Daily Activity Raw Score: 17/24   Initial Eval Status  Date: 5/27/21 Treatment Status  Date: STGs = LTGs  Time frame: 10-14 days   Feeding Independent      Grooming Stand by Assist   Standing bilateral tasks  Independent    UB Dressing Stand by Assist   Independent    LB Dressing Moderate Assist   Modified [place]    Bathing Moderate Assist  Modified [place]    Toileting Minimal [id number]       ADL/Self Care [id number]       Orthotic Management [id number]       Manual [id number]     Neuro Re-Ed [id number]       Non-Billable Time          Evaluation Time additionally includes thorough review of current medical information, gathering information on past medical history/social history and prior level of function, interpretation of standardized testing/informal observation of tasks, assessment of data and development of plan of care and goals.             Kinjal Florez, OTR/L #[id number]

## 2021-05-28 NOTE — DISCHARGE SUMMARY
Physician Discharge Summary    Patient ID:  Rodrigo Meza  [id number]  27 y.o.  1965    Admit date: 5/26/2021    Discharge date and time: 5/27/2021  3:53 PM     Admitting Physician: Mckenzie Quintana DO     Discharge Physician: Mckenzie Quintana DO    Admission Diagnoses: Primary osteoarthritis of right knee [M17.11]    Discharge Diagnoses: s/p right TKA     Admission Condition: good    Discharged Condition: good    Hospital Course: The patient was admitted on 5/26/2021. The patient underwent an uneventful course of right TKA by Mckenzie Quintana DO on 5/26/21. The patient was subsequently taken to the PACU and the floor in stable condition. The patient was started on pain control, DVT prophylaxis, antibiotics, and physical therapy as indicated. Blood counts were followed as needed. Discharge planning was performed and tailored in regards to patient progress, therapy progress, home needs, and support. Once the patient had made appropriate gains, they were able to be discharged to home. Treatments: s/p right TKA    Disposition: The patient was provided instructions to continue antibiotics, pain medication, DVT prophylaxis, Dressing changes as applicable. The patient was instructed on restrictions and activities. The patient was to follow up with Mckenzie Quintana DO, and to call the office for an appointment. Medication List      START taking these medications    aspirin 325 MG EC tablet  Take 1 tablet by mouth 2 times daily     celecoxib 100 MG capsule  Commonly known as: CeleBREX  Take 1 capsule by mouth daily     gabapentin 100 MG capsule  Commonly known as: Neurontin  Take 1 capsule by mouth 3 times daily for 30 days. oxyCODONE-acetaminophen 5-325 MG per tablet  Commonly known as: Percocet  Take 1 tablet by mouth every 6 hours as needed for Pain for up to 7 days. Intended supply: 7 days.  Take lowest dose possible to manage pain        CONTINUE taking these medications

## 2021-06-07 ENCOUNTER — TELEPHONE (OUTPATIENT)
Dept: ORTHOPEDIC SURGERY | Age: 56
End: 2021-06-07

## 2021-06-07 DIAGNOSIS — Z98.890 HISTORY OF SURGERY: ICD-10-CM

## 2021-06-07 RX ORDER — OXYCODONE HYDROCHLORIDE AND ACETAMINOPHEN 5; 325 MG/1; MG/1
1 TABLET ORAL EVERY 6 HOURS PRN
Qty: 28 TABLET | Refills: 0 | Status: SHIPPED | OUTPATIENT
Start: 2021-06-07 | End: 2021-06-14

## 2021-06-08 DIAGNOSIS — M17.11 PRIMARY OSTEOARTHRITIS OF RIGHT KNEE: ICD-10-CM

## 2021-06-08 DIAGNOSIS — Z98.890 HISTORY OF SURGERY: Primary | ICD-10-CM

## 2021-06-10 ENCOUNTER — OFFICE VISIT (OUTPATIENT)
Dept: ORTHOPEDIC SURGERY | Age: 56
End: 2021-06-10

## 2021-06-10 VITALS — TEMPERATURE: 98 F | WEIGHT: 241 LBS | HEIGHT: 64 IN | BODY MASS INDEX: 41.15 KG/M2

## 2021-06-10 DIAGNOSIS — Z96.651 STATUS POST RIGHT KNEE REPLACEMENT: Primary | ICD-10-CM

## 2021-06-10 PROCEDURE — 99024 POSTOP FOLLOW-UP VISIT: CPT | Performed by: ORTHOPAEDIC SURGERY

## 2021-06-10 RX ORDER — TRAMADOL HYDROCHLORIDE 50 MG/1
50 TABLET ORAL EVERY 6 HOURS PRN
Qty: 28 TABLET | Refills: 0 | Status: SHIPPED | OUTPATIENT
Start: 2021-06-10 | End: 2021-06-17

## 2021-06-10 NOTE — PROGRESS NOTES
Subjective:     Post-Operative week: 2 Status Post right Total Knee Arthroplasty, surgery date 05/26/2021. Systemic or Specific Complaints:none    Objective:     General: alert, appears stated age and cooperative   Wound: Wound clean and dry no evidence of infection. , No Erythema, No Edema and No Drainage   Motion: Flexion: 0 to 110 Degrees   DVT Exam: No evidence of DVT seen on physical exam.  Negative Radha's sign. No cords or calf tenderness. No significant calf/ankle edema. Imaging:  Xrays:  No signs of fracture, there is good alignment, and no signs of aseptic loosening. Radiographic findings reviewed with patient    Assessment:     Encounter Diagnosis   Name Primary?  Status post right knee replacement Yes      Doing well postoperatively. Plan:     Continues current post-op course, staples were removed at today's appointment  Patient is to continue taking enteric coated aspirin 81 mg, 1 tablet twice daily. Patient is to continue wearing LINO hose, on during the day and off at night. Outpatient physical therapy is to begin immediately. No bathing/swimming/hot tub for two weeks or until incision is completely closed. We will see the patient back in 3 weeks for repeat xray and evaluation.   Please call office with any questions or concerns at [phone]

## 2021-06-23 ASSESSMENT — PROMIS GLOBAL HEALTH SCALE
IN GENERAL, HOW WOULD YOU RATE YOUR SATISFACTION WITH YOUR SOCIAL ACTIVITIES AND RELATIONSHIPS [ON A SCALE OF 1 (POOR) TO 5 (EXCELLENT)]?: 5
IN GENERAL, WOULD YOU SAY YOUR QUALITY OF LIFE IS...[ON A SCALE OF 1 (POOR) TO 5 (EXCELLENT)]: 5
IN THE PAST 7 DAYS, HOW OFTEN HAVE YOU BEEN BOTHERED BY EMOTIONAL PROBLEMS, SUCH AS FEELING ANXIOUS, DEPRESSED, OR IRRITABLE [ON A SCALE FROM 1 (NEVER) TO 5 (ALWAYS)]?: 2
IN THE PAST 7 DAYS, HOW WOULD YOU RATE YOUR PAIN ON AVERAGE [ON A SCALE FROM 0 (NO PAIN) TO 10 (WORST IMAGINABLE PAIN)]?: 2
HOW IS THE PROMIS V1.1 BEING ADMINISTERED?: 2
IN GENERAL, WOULD YOU SAY YOUR HEALTH IS...[ON A SCALE OF 1 (POOR) TO 5 (EXCELLENT)]: 5

## 2021-06-23 ASSESSMENT — KOOS JR
BENDING TO THE FLOOR TO PICK UP OBJECT: 1
GOING UP OR DOWN STAIRS: 1
HOW SEVERE IS YOUR KNEE STIFFNESS AFTER FIRST WAKING IN MORNING: 2
STANDING UPRIGHT: 1
RISING FROM SITTING: 1

## 2021-07-08 ENCOUNTER — OFFICE VISIT (OUTPATIENT)
Dept: ORTHOPEDIC SURGERY | Age: 56
End: 2021-07-08

## 2021-07-08 VITALS — HEIGHT: 64 IN | WEIGHT: 241 LBS | BODY MASS INDEX: 41.15 KG/M2 | TEMPERATURE: 98 F

## 2021-07-08 DIAGNOSIS — Z96.651 STATUS POST RIGHT KNEE REPLACEMENT: Primary | ICD-10-CM

## 2021-07-08 PROCEDURE — 99024 POSTOP FOLLOW-UP VISIT: CPT | Performed by: ORTHOPAEDIC SURGERY

## 2021-07-08 NOTE — PROGRESS NOTES
Subjective:     Post-Operative week: 6 Status Post right Total Knee Arthroplasty, surgery date 05/26/2021. Systemic or Specific Complaints:none    Objective:     General: alert, appears stated age and cooperative   Wound: Wound clean and dry no evidence of infection. , No Erythema, No Edema and No Drainage   Motion: Flexion: 0 to 120 Degrees   DVT Exam: No evidence of DVT seen on physical exam.  Negative Radha's sign. No cords or calf tenderness. No significant calf/ankle edema. Imaging:  Xrays:  Not performed today. Radiographic findings reviewed with patient    Assessment:     Encounter Diagnosis   Name Primary?  Status post right knee replacement Yes      Doing well postoperatively. Plan:     She will D/C LINO hose and ASA. She will finish up her PT and continue with activities as tolerated. I will see her back in 2 months.

## 2021-08-06 ENCOUNTER — NURSE ONLY (OUTPATIENT)
Dept: ORTHOPEDIC SURGERY | Age: 56
End: 2021-08-06
Payer: COMMERCIAL

## 2021-08-06 DIAGNOSIS — M17.12 PRIMARY OSTEOARTHRITIS OF LEFT KNEE: Primary | ICD-10-CM

## 2021-08-06 PROCEDURE — 20610 DRAIN/INJ JOINT/BURSA W/O US: CPT | Performed by: NURSE PRACTITIONER

## 2021-08-06 RX ORDER — HYALURONATE SODIUM 10 MG/ML
20 SYRINGE (ML) INTRAARTICULAR ONCE
Status: COMPLETED | OUTPATIENT
Start: 2021-08-06 | End: 2021-08-06

## 2021-08-06 RX ADMIN — Medication 20 MG: at 08:55

## 2021-08-06 NOTE — PROGRESS NOTES
Chief Complaint   Patient presents with    Injections     left knee euflexxa #1       Verbal and written consent was obtained by the patient. The following is a well known to me that is here for Euflexxa # 1. The knee was prepped in sterile fashion. Euflexxa 20 mg injected to Left knee. The patient tolerated the injections well and I will see the patient back in 1 week for repeat injections. Audie Murdock was seen today for injections.     Diagnoses and all orders for this visit:    Primary osteoarthritis of left knee  -     [address] - [place] DRAIN/INJECT LARGE JOINT/BURSA    Other orders  -     sodium hyaluronate (EUFLEXXA, HYALGAN) injection 20 mg

## 2021-08-13 ENCOUNTER — NURSE ONLY (OUTPATIENT)
Dept: ORTHOPEDIC SURGERY | Age: 56
End: 2021-08-13
Payer: COMMERCIAL

## 2021-08-13 DIAGNOSIS — M17.12 PRIMARY OSTEOARTHRITIS OF LEFT KNEE: Primary | ICD-10-CM

## 2021-08-13 PROCEDURE — 20610 DRAIN/INJ JOINT/BURSA W/O US: CPT | Performed by: NURSE PRACTITIONER

## 2021-08-13 RX ORDER — HYALURONATE SODIUM 10 MG/ML
20 SYRINGE (ML) INTRAARTICULAR ONCE
Status: COMPLETED | OUTPATIENT
Start: 2021-08-13 | End: 2021-08-13

## 2021-08-13 RX ADMIN — Medication 20 MG: at 08:18

## 2021-08-13 NOTE — PROGRESS NOTES
Chief Complaint   Patient presents with    Injections     left knee euflexxa #2       Verbal and written consent was obtained by the patient. The following is a well known to me that is here for Euflexxa # 2. The knee was prepped in sterile fashion. Euflexxa 20 mg injected to Left knee. The patient tolerated the injections well and I will see the patient back in 1 week for repeat injections. Farhanapeppertristen Hunt was seen today for injections.     Diagnoses and all orders for this visit:    Primary osteoarthritis of left knee  -     [address] - [place] DRAIN/INJECT LARGE JOINT/BURSA    Other orders  -     sodium hyaluronate (EUFLEXXA, HYALGAN) injection 20 mg

## 2021-08-20 ENCOUNTER — NURSE ONLY (OUTPATIENT)
Dept: ORTHOPEDIC SURGERY | Age: 56
End: 2021-08-20
Payer: COMMERCIAL

## 2021-08-20 DIAGNOSIS — M17.12 PRIMARY OSTEOARTHRITIS OF LEFT KNEE: Primary | ICD-10-CM

## 2021-08-20 PROCEDURE — 20610 DRAIN/INJ JOINT/BURSA W/O US: CPT | Performed by: NURSE PRACTITIONER

## 2021-08-20 RX ORDER — HYALURONATE SODIUM 10 MG/ML
20 SYRINGE (ML) INTRAARTICULAR ONCE
Status: COMPLETED | OUTPATIENT
Start: 2021-08-20 | End: 2021-08-20

## 2021-08-20 RX ADMIN — Medication 20 MG: at 08:28

## 2021-08-20 NOTE — PROGRESS NOTES
Chief Complaint   Patient presents with    Injections     left knee euflexxa #3       Verbal and written consent was obtained by the patient. The following is a well known to me that is here for Euflexxa #3. The knee was prepped in sterile fashion. Euflexxa 20 mg injected to Left knee . The patient tolerated the injections well and I will see the patient back as needed. Madie Hunt was seen today for injections.     Diagnoses and all orders for this visit:    Primary osteoarthritis of left knee  -     [address] - [place] DRAIN/INJECT LARGE JOINT/BURSA    Other orders  -     sodium hyaluronate (EUFLEXXA, HYALGAN) injection 20 mg

## 2021-09-09 ENCOUNTER — OFFICE VISIT (OUTPATIENT)
Dept: ORTHOPEDIC SURGERY | Age: 56
End: 2021-09-09
Payer: COMMERCIAL

## 2021-09-09 VITALS — TEMPERATURE: 98 F | BODY MASS INDEX: 40.12 KG/M2 | HEIGHT: 64 IN | WEIGHT: 235 LBS

## 2021-09-09 DIAGNOSIS — Z96.651 STATUS POST RIGHT KNEE REPLACEMENT: Primary | ICD-10-CM

## 2021-09-09 PROCEDURE — 99213 OFFICE O/P EST LOW 20 MIN: CPT | Performed by: ORTHOPAEDIC SURGERY

## 2021-09-09 RX ORDER — CEPHALEXIN 500 MG/1
2000 CAPSULE ORAL DAILY PRN
Qty: 4 CAPSULE | Refills: 0 | Status: SHIPPED
Start: 2021-09-09 | End: 2022-03-01 | Stop reason: ALTCHOICE

## 2021-09-09 NOTE — PROGRESS NOTES
Chief Complaint   Patient presents with    Knee Pain     right knee TKA f/u doing good, need antibiotic for dental procedure next week       [facility] returns today for follow-up of her right knee TKA 05/26/2021. Patient is doing well and continues to exercise. Past Medical History:   Diagnosis Date    Arthritis     Depression     HTN (hypertension)     Seasonal allergies      Past Surgical History:   Procedure Laterality Date    CHOLECYSTECTOMY      COLONOSCOPY      DILATION AND CURETTAGE OF UTERUS      TONSILLECTOMY      TOTAL KNEE ARTHROPLASTY Right 5/26/2021    RIGHT KNEE TOTAL KNEE ARTHROPLASTY ([employer] & NEPHEW) performed by Alphonse Gann DO at [address]       Current Outpatient Medications:     aspirin 325 MG EC tablet, Take 1 tablet by mouth 2 times daily, Disp: 56 tablet, Rfl: 0    cetirizine (ZYRTEC) 10 MG tablet, Take 10 mg by mouth daily, Disp: , Rfl:     Elastic Bandages & Supports (KNEE BRACE ADJUSTABLE HINGED) MISC, Wear brace when active, Disp: 1 each, Rfl: 0    KLOR-CON M20 20 MEQ extended release tablet, Take 40 mEq by mouth daily , Disp: , Rfl:     atenolol-chlorthalidone (TENORETIC) 50-25 MG per tablet, Take 1 tablet by mouth nightly , Disp: , Rfl:     citalopram (CELEXA) 40 MG tablet, Take 40 mg by mouth nightly , Disp: , Rfl:     celecoxib (CELEBREX) 100 MG capsule, Take 1 capsule by mouth daily, Disp: 30 capsule, Rfl: 0    gabapentin (NEURONTIN) 100 MG capsule, Take 1 capsule by mouth 3 times daily for 30 days. , Disp: 90 capsule, Rfl: 0  Allergies   Allergen Reactions    Ace Inhibitors Other (See Comments)    Adhesive Tape Dermatitis    Pcn [Penicillins]      Social History     Socioeconomic History    Marital status:      Spouse name: Not on file    Number of children: Not on file    Years of education: Not on file    Highest education level: Not on file   Occupational History    Not on file   Tobacco Use    Smoking status: Never Smoker    Smokeless 235 lb (106.6 kg)   BMI 40.34 kg/m²     Skin:  Upon inspection: the skin appears warm, dry and intact. There is not a previous scar over the affected area. There is not any cellulitis, lymphedema or cutaneous lesions noted in the lower extremities. Upon palpation there is no induration noted. Neurologic:  Gait: normal;  Motor exam of the lower extremities show ; quadriceps, hamstrings, foot dorsi and plantar flexors intact R.  5/5 and L. 5/5. Deep tendon reflexes are 2/4 at the knees and 2/4 at the ankles with strong extensor hallicus longus motor strength bilaterally. Sensory to both feet is intact to all sensory roots. Cardiovascular: The vascular exam is normal and is well perfused to distal extremities. Distal pulses DP/PT: R. 2+; L. 2+. There is cap refill noted less than two seconds in all digits. There is not edema of the bilateral lower extremities. There is not varicosities noted in the distal extremities. Lymph:  Upon palpation,  there is no lymphadenopathy noted in bilateral lower extremities. Musculoskeletal:  Gait: antalgic; examination of the nails and digits reveal no cyanosis or clubbing    Lumbar exam:  On visual inspection, there is no deformity of the spine. full range of motion, no tenderness, palpable spasm or pain on motion. Special tests: Straight Leg Raise negative, Rubén testnegative. Hip exam:  Upon inspection, there is no deformity noted. Upon palpation there is not tenderness. ROM: is   full and semetrical.   Strength: Hip Flexors 5/5; Hip Abductors 5/5; Hip Adduction 5/5. Knee exam:  Right knee exam shows;  range of motion of R. Knee is 0 to 115, and L. Knee is 0 to 110. She does not have  pain on motion, effusion is none, there is tenderness over the  anterior region, there are not any masses, there is not ligamentous instability, there is  deformity noted.  Knee exam: right positive for moderate crepitations, some mild tenderness laxity is not noted

## 2021-11-11 ENCOUNTER — TELEPHONE (OUTPATIENT)
Dept: ORTHOPEDIC SURGERY | Age: 56
End: 2021-11-11

## 2021-11-29 ENCOUNTER — ANESTHESIA EVENT (OUTPATIENT)
Dept: OPERATING ROOM | Age: 56
End: 2021-11-29
Payer: COMMERCIAL

## 2021-11-29 ENCOUNTER — HOSPITAL ENCOUNTER (OUTPATIENT)
Dept: PREADMISSION TESTING | Age: 56
Discharge: HOME | End: 2021-11-29
Payer: COMMERCIAL

## 2021-11-29 VITALS
TEMPERATURE: 98.2 F | SYSTOLIC BLOOD PRESSURE: 127 MMHG | OXYGEN SATURATION: 97 % | HEIGHT: 60 IN | BODY MASS INDEX: 47.32 KG/M2 | WEIGHT: 241 LBS | HEART RATE: 79 BPM | RESPIRATION RATE: 16 BRPM | DIASTOLIC BLOOD PRESSURE: 75 MMHG

## 2021-11-29 DIAGNOSIS — M17.12 PRIMARY OSTEOARTHRITIS OF LEFT KNEE: ICD-10-CM

## 2021-11-29 LAB
ALBUMIN SERPL-MCNC: 4.1 G/DL (ref 3.5–5.2)
ALP BLD-CCNC: 67 U/L (ref 35–104)
ALT SERPL-CCNC: 22 U/L (ref 0–32)
ANION GAP SERPL CALCULATED.3IONS-SCNC: 11 MMOL/L (ref 7–16)
APTT: 28 SEC (ref 24.5–35.1)
AST SERPL-CCNC: 24 U/L (ref 0–31)
BASOPHILS ABSOLUTE: 0.04 E9/L (ref 0–0.2)
BASOPHILS RELATIVE PERCENT: 0.6 % (ref 0–2)
BILIRUB SERPL-MCNC: 0.5 MG/DL (ref 0–1.2)
BILIRUBIN URINE: NEGATIVE
BLOOD, URINE: NEGATIVE
BUN BLDV-MCNC: 11 MG/DL (ref 6–20)
CALCIUM SERPL-MCNC: 9.5 MG/DL (ref 8.6–10.2)
CHLORIDE BLD-SCNC: 100 MMOL/L (ref 98–107)
CLARITY: CLEAR
CO2: 28 MMOL/L (ref 22–29)
COLOR: NORMAL
CREAT SERPL-MCNC: 0.8 MG/DL (ref 0.5–1)
EOSINOPHILS ABSOLUTE: 0.13 E9/L (ref 0.05–0.5)
EOSINOPHILS RELATIVE PERCENT: 2 % (ref 0–6)
GFR AFRICAN AMERICAN: >60
GFR NON-AFRICAN AMERICAN: >60 ML/MIN/1.73
GLUCOSE BLD-MCNC: 91 MG/DL (ref 74–99)
GLUCOSE URINE: NEGATIVE MG/DL
HBA1C MFR BLD: 6.1 % (ref 4–5.6)
HCT VFR BLD CALC: 40.5 % (ref 34–48)
HEMOGLOBIN: 13.1 G/DL (ref 11.5–15.5)
IMMATURE GRANULOCYTES #: 0.01 E9/L
IMMATURE GRANULOCYTES %: 0.2 % (ref 0–5)
INR BLD: 1
KETONES, URINE: NEGATIVE MG/DL
LEUKOCYTE ESTERASE, URINE: NEGATIVE
LYMPHOCYTES ABSOLUTE: 2.67 E9/L (ref 1.5–4)
LYMPHOCYTES RELATIVE PERCENT: 41 % (ref 20–42)
MCH RBC QN AUTO: 30.8 PG (ref 26–35)
MCHC RBC AUTO-ENTMCNC: 32.3 % (ref 32–34.5)
MCV RBC AUTO: 95.3 FL (ref 80–99.9)
MONOCYTES ABSOLUTE: 0.44 E9/L (ref 0.1–0.95)
MONOCYTES RELATIVE PERCENT: 6.8 % (ref 2–12)
NEUTROPHILS ABSOLUTE: 3.22 E9/L (ref 1.8–7.3)
NEUTROPHILS RELATIVE PERCENT: 49.4 % (ref 43–80)
NITRITE, URINE: NEGATIVE
PDW BLD-RTO: 13.2 FL (ref 11.5–15)
PH UA: 6.5 (ref 5–9)
PLATELET # BLD: 280 E9/L (ref 130–450)
PMV BLD AUTO: 10.1 FL (ref 7–12)
POTASSIUM SERPL-SCNC: 3.4 MMOL/L (ref 3.5–5)
PREALBUMIN: 20 MG/DL (ref 20–40)
PROTEIN UA: NEGATIVE MG/DL
PROTHROMBIN TIME: 11.9 SEC (ref 9.3–12.4)
RBC # BLD: 4.25 E12/L (ref 3.5–5.5)
SODIUM BLD-SCNC: 139 MMOL/L (ref 132–146)
SPECIFIC GRAVITY UA: <=1.005 (ref 1–1.03)
TOTAL PROTEIN: 7.5 G/DL (ref 6.4–8.3)
UROBILINOGEN, URINE: 0.2 E.U./DL
WBC # BLD: 6.5 E9/L (ref 4.5–11.5)

## 2021-11-29 PROCEDURE — 36415 COLL VENOUS BLD VENIPUNCTURE: CPT

## 2021-11-29 PROCEDURE — 85610 PROTHROMBIN TIME: CPT

## 2021-11-29 PROCEDURE — 83036 HEMOGLOBIN GLYCOSYLATED A1C: CPT

## 2021-11-29 PROCEDURE — 84134 ASSAY OF PREALBUMIN: CPT

## 2021-11-29 PROCEDURE — 85730 THROMBOPLASTIN TIME PARTIAL: CPT

## 2021-11-29 PROCEDURE — 87088 URINE BACTERIA CULTURE: CPT

## 2021-11-29 PROCEDURE — 80053 COMPREHEN METABOLIC PANEL: CPT

## 2021-11-29 PROCEDURE — 87081 CULTURE SCREEN ONLY: CPT

## 2021-11-29 PROCEDURE — 81003 URINALYSIS AUTO W/O SCOPE: CPT

## 2021-11-29 PROCEDURE — 85025 COMPLETE CBC W/AUTO DIFF WBC: CPT

## 2021-11-29 RX ORDER — MIDAZOLAM HYDROCHLORIDE 1 MG/ML
1 INJECTION INTRAMUSCULAR; INTRAVENOUS EVERY 5 MIN PRN
Status: CANCELLED | OUTPATIENT
Start: 2021-11-29

## 2021-11-29 RX ORDER — ROPIVACAINE HYDROCHLORIDE 5 MG/ML
30 INJECTION, SOLUTION EPIDURAL; INFILTRATION; PERINEURAL
Status: CANCELLED | OUTPATIENT
Start: 2021-11-29 | End: 2021-11-29

## 2021-11-29 RX ORDER — FENTANYL CITRATE 50 UG/ML
100 INJECTION, SOLUTION INTRAMUSCULAR; INTRAVENOUS ONCE
Status: CANCELLED | OUTPATIENT
Start: 2021-11-29 | End: 2021-11-29

## 2021-11-29 ASSESSMENT — PAIN DESCRIPTION - LOCATION: LOCATION: KNEE

## 2021-11-29 ASSESSMENT — LIFESTYLE VARIABLES: SMOKING_STATUS: 0

## 2021-11-29 ASSESSMENT — PAIN DESCRIPTION - FREQUENCY: FREQUENCY: INTERMITTENT

## 2021-11-29 ASSESSMENT — PAIN DESCRIPTION - ONSET: ONSET: ON-GOING

## 2021-11-29 ASSESSMENT — PAIN DESCRIPTION - PROGRESSION: CLINICAL_PROGRESSION: GRADUALLY WORSENING

## 2021-11-29 ASSESSMENT — PAIN - FUNCTIONAL ASSESSMENT: PAIN_FUNCTIONAL_ASSESSMENT: PREVENTS OR INTERFERES WITH MANY ACTIVE NOT PASSIVE ACTIVITIES

## 2021-11-29 ASSESSMENT — PAIN SCALES - GENERAL: PAINLEVEL_OUTOF10: 8

## 2021-11-29 ASSESSMENT — PAIN DESCRIPTION - PAIN TYPE: TYPE: CHRONIC PAIN

## 2021-11-29 ASSESSMENT — PAIN DESCRIPTION - DESCRIPTORS: DESCRIPTORS: ACHING;SHOOTING

## 2021-11-29 ASSESSMENT — PAIN DESCRIPTION - ORIENTATION: ORIENTATION: LEFT

## 2021-11-29 NOTE — PROGRESS NOTES
[address] [facility]                                                                                                                    PRE OP INSTRUCTIONS FOR  Carlos Marlow        Date: 11/29/2021    Date of surgery: 12/1/21   Arrival Time: Hospital will call you between 5pm and 7pm with your final arrival time for surgery    1. Do not eat or drink anything after midnight  prior to surgery. This includes no water, chewing gum, mints or ice chips. 2. Take the following medications with a small sip of water on the morning of Surgery: allergy med if needed       3. Aspirin, Ibuprofen, Advil, Naproxen, Vitamin E and other Anti-inflammatory products should be stopped  before surgery  as directed by your physician. Take Tylenol only unless instructed otherwise by your surgeon. 4. Do not smoke,use illicit drugs and do not drink any alcoholic beverages 24 hours prior to surgery. 5. You may brush your teeth the morning of surgery. DO NOT SWALLOW WATER        6. Please wear simple, loose fitting clothing to the hospital.  Dorena Reginald not bring valuables (money, credit cards, checkbooks, etc.) Do not wear any makeup (including no eye makeup) or nail polish on your fingers or toes. 7. DO NOT wear any jewelry or piercings on day of surgery. All body piercing jewelry must be removed. 8. Shower the night before surgery with _x__Antibacterial soap /SEAN WIPES_x_______        9. If appropriate bring crutches, inspirex, WALKER, CANE etc...    10. Notify your Surgeon if you develop any illness between now and surgery time, cough, cold, fever, sore throat, nausea, vomiting, etc.  Please notify your surgeon if you experience dizziness, shortness of breath or blurred vision between now & the time of your surgery. 11. If you have ___dentures, they will be removed before going to the OR; we will provide you a container.  If you wear ___contact lenses or _x__glasses, they will be removed; please bring a case for them. 12. To provide excellent care visitors will be limited to 1 in the room at any given time. 13. During flu season no children under the age of 15 are permitted in the hospital for the safety of all patients. 14. Other come in main lobby and stop at                  Please call AMBULATORY CARE if you have any further questions.    [address] [facility]     [address]

## 2021-11-29 NOTE — ANESTHESIA PRE PROCEDURE
Department of Anesthesiology  Preprocedure Note       Name:  Forrest Balderrama   Age:  49 y.o.  :  1965                                          MRN:  [id number]         Date:  2021      Surgeon: Dior Adhikari): Danielle Diamond DO    Procedure: Procedure(s):  LEFT TOTAL KNEE  ARTHROPLASTY([employer] & NEPHEW )    Medications prior to admission:   Prior to Admission medications    Medication Sig Start Date End Date Taking? Authorizing Provider   cephALEXin (KEFLEX) 500 MG capsule Take 4 capsules by mouth daily as needed (take 2 g, 1 hour prior to dental procedure) 21   Danielle Diamond DO   aspirin 325 MG EC tablet Take 1 tablet by mouth 2 times daily 21  Reta Brown DO   celecoxib (CELEBREX) 100 MG capsule Take 1 capsule by mouth daily 21  Reta Brown,    gabapentin (NEURONTIN) 100 MG capsule Take 1 capsule by mouth 3 times daily for 30 days. 21  Reta Brown DO   cetirizine (ZYRTEC) 10 MG tablet Take 10 mg by mouth daily    Historical Provider, MD   Elastic Bandages & Supports (KNEE BRACE ADJUSTABLE HINGED) MISC Wear brace when active 12/3/19   Danielle Diamond DO   KLOR-CON M20 20 MEQ extended release tablet Take 40 mEq by mouth daily  3/29/18   Historical Provider, MD   atenolol-chlorthalidone (TENORETIC) 50-25 MG per tablet Take 1 tablet by mouth nightly  18   Historical Provider, MD   citalopram (CELEXA) 40 MG tablet Take 40 mg by mouth nightly     Historical Provider, MD       Current medications:    Current Outpatient Medications   Medication Sig Dispense Refill    cephALEXin (KEFLEX) 500 MG capsule Take 4 capsules by mouth daily as needed (take 2 g, 1 hour prior to dental procedure) 4 capsule 0    aspirin 325 MG EC tablet Take 1 tablet by mouth 2 times daily 56 tablet 0    celecoxib (CELEBREX) 100 MG capsule Take 1 capsule by mouth daily 30 capsule 0    gabapentin (NEURONTIN) 100 MG capsule Take 1 capsule by mouth 3 times daily for 30 days.  80 capsule 0    cetirizine (ZYRTEC) 10 MG tablet Take 10 mg by mouth daily      Elastic Bandages & Supports (KNEE BRACE ADJUSTABLE HINGED) MISC Wear brace when active 1 each 0    KLOR-CON M20 20 MEQ extended release tablet Take 40 mEq by mouth daily       atenolol-chlorthalidone (TENORETIC) 50-25 MG per tablet Take 1 tablet by mouth nightly       citalopram (CELEXA) 40 MG tablet Take 40 mg by mouth nightly        No current facility-administered medications for this visit. Allergies: Allergies   Allergen Reactions    Ace Inhibitors Other (See Comments)    Adhesive Tape Dermatitis    Pcn [Penicillins]        Problem List:    Patient Active Problem List   Diagnosis Code    Primary osteoarthritis of right knee M17.11    Primary osteoarthritis of left knee M17.12       Past Medical History:        Diagnosis Date    Arthritis     Depression     HTN (hypertension)     Seasonal allergies        Past Surgical History:        Procedure Laterality Date    CHOLECYSTECTOMY      COLONOSCOPY      DILATION AND CURETTAGE OF UTERUS      TONSILLECTOMY      TOTAL KNEE ARTHROPLASTY Right 5/26/2021    RIGHT KNEE TOTAL KNEE ARTHROPLASTY [facility] & NEPHEW) performed by Cecile Purcell DO at [address] History:    Social History     Tobacco Use    Smoking status: Never Smoker    Smokeless tobacco: Never Used   Substance Use Topics    Alcohol use: No                                Counseling given: Not Answered      Vital Signs (Current): There were no vitals filed for this visit.                                            BP Readings from Last 3 Encounters:   05/27/21 133/64   05/26/21 101/68   05/21/21 (!) 110/57       NPO Status:                                                                                 BMI:   Wt Readings from Last 3 Encounters:   09/09/21 235 lb (106.6 kg)   07/08/21 241 lb (109.3 kg)   06/10/21 241 lb (109.3 kg)     There is no height or weight on file to calculate BMI.    CBC:   Lab Results   Component Value Date    WBC 10.6 05/26/2021    RBC 3.91 05/26/2021    HGB 11.6 05/27/2021    HCT 36.9 05/27/2021    MCV 94.9 05/26/2021    RDW 13.1 05/26/2021     05/26/2021       CMP:   Lab Results   Component Value Date     05/27/2021    K 3.8 05/27/2021    CL 99 05/27/2021    CO2 28 05/27/2021    BUN 13 05/27/2021    CREATININE 0.8 05/27/2021    GFRAA >60 05/27/2021    LABGLOM >60 05/27/2021    GLUCOSE 116 05/27/2021    PROT 7.1 05/26/2021    CALCIUM 8.9 05/27/2021    BILITOT 0.4 05/26/2021    ALKPHOS 63 05/26/2021    AST 27 05/26/2021    ALT 24 05/26/2021       POC Tests: No results for input(s): POCGLU, POCNA, POCK, POCCL, POCBUN, POCHEMO, POCHCT in the last 72 hours. Coags:   Lab Results   Component Value Date    PROTIME 11.8 05/21/2021    INR 1.0 05/21/2021    APTT 30.1 05/21/2021       HCG (If Applicable): No results found for: PREGTESTUR, PREGSERUM, HCG, HCGQUANT     ABGs: No results found for: PHART, PO2ART, BEX8JVO, YKJ2YHI, BEART, H8PHYQPP     Type & Screen (If Applicable):  No results found for: LABABO, LABRH    Drug/Infectious Status (If Applicable):  No results found for: HIV, HEPCAB    COVID-19 Screening (If Applicable): No results found for: COVID19        Anesthesia Evaluation  Patient summary reviewed no history of anesthetic complications:   Airway: Mallampati: II  TM distance: >3 FB   Neck ROM: full  Mouth opening: > = 3 FB Dental:          Pulmonary:Negative Pulmonary ROS breath sounds clear to auscultation      (-) not a current smoker          Patient did not smoke on day of surgery. ROS comment: Seasonal Allergies. Cardiovascular:    (+) hypertension:, hyperlipidemia      ECG reviewed  Rhythm: regular  Rate: normal           Beta Blocker:  Dose within 24 Hrs      ROS comment: EKG: Normal Sinus Rhythm 61.      Neuro/Psych:   (+) psychiatric history:depression/anxiety             GI/Hepatic/Renal: Neg GI/Hepatic/Renal ROS Morbid obesity:   Super morbidly obese. Endo/Other:    (+) : arthritis: OA., .                 Abdominal:   (+) obese ( Super morbidly obese),           Vascular: negative vascular ROS. Other Findings:             Anesthesia Plan      spinal and regional     ASA 3     (Single shot Left Adductor Canal Nerve Block for post-op pain management. Back-up general.)  Induction: intravenous. MIPS: Postoperative opioids intended. Anesthetic plan and risks discussed with patient. Plan discussed with CRNA.               Shelia Castaneda MD  Staff Anesthesiologist  6:44 AM

## 2021-11-29 NOTE — PROGRESS NOTES
Spoke with Dr Darien August re K+ of 3.4   Called pt and told her to be sure to take K+ supplement . Also faxed to Mathew Fine and Aundria Moritz.   Rock Alberto RN  11/29/2021  3:08 PM

## 2021-11-30 LAB — MRSA CULTURE ONLY: NORMAL

## 2021-12-01 ENCOUNTER — ANESTHESIA (OUTPATIENT)
Dept: OPERATING ROOM | Age: 56
End: 2021-12-01
Payer: COMMERCIAL

## 2021-12-01 ENCOUNTER — HOSPITAL ENCOUNTER (OUTPATIENT)
Age: 56
Discharge: HOME | End: 2021-12-02
Attending: ORTHOPAEDIC SURGERY | Admitting: ORTHOPAEDIC SURGERY
Payer: COMMERCIAL

## 2021-12-01 ENCOUNTER — APPOINTMENT (OUTPATIENT)
Dept: GENERAL RADIOLOGY | Age: 56
End: 2021-12-01
Attending: ORTHOPAEDIC SURGERY
Payer: COMMERCIAL

## 2021-12-01 VITALS
OXYGEN SATURATION: 94 % | SYSTOLIC BLOOD PRESSURE: 110 MMHG | DIASTOLIC BLOOD PRESSURE: 62 MMHG | TEMPERATURE: 97.7 F | RESPIRATION RATE: 21 BRPM

## 2021-12-01 DIAGNOSIS — M17.12 PRIMARY OSTEOARTHRITIS OF LEFT KNEE: Primary | ICD-10-CM

## 2021-12-01 LAB — URINE CULTURE, ROUTINE: NORMAL

## 2021-12-01 PROCEDURE — 3700000000 HC ANESTHESIA ATTENDED CARE: Performed by: ORTHOPAEDIC SURGERY

## 2021-12-01 PROCEDURE — 6360000002 HC RX W HCPCS: Performed by: NURSE PRACTITIONER

## 2021-12-01 PROCEDURE — C1769 GUIDE WIRE: HCPCS | Performed by: ORTHOPAEDIC SURGERY

## 2021-12-01 PROCEDURE — 6360000002 HC RX W HCPCS

## 2021-12-01 PROCEDURE — 2580000003 HC RX 258: Performed by: NURSE PRACTITIONER

## 2021-12-01 PROCEDURE — 2580000003 HC RX 258: Performed by: ORTHOPAEDIC SURGERY

## 2021-12-01 PROCEDURE — 6360000002 HC RX W HCPCS: Performed by: ANESTHESIOLOGY

## 2021-12-01 PROCEDURE — 6360000002 HC RX W HCPCS: Performed by: NURSE ANESTHETIST, CERTIFIED REGISTERED

## 2021-12-01 PROCEDURE — 64447 NJX AA&/STRD FEMORAL NRV IMG: CPT | Performed by: ANESTHESIOLOGY

## 2021-12-01 PROCEDURE — 3600000005 HC SURGERY LEVEL 5 BASE: Performed by: ORTHOPAEDIC SURGERY

## 2021-12-01 PROCEDURE — 2500000003 HC RX 250 WO HCPCS: Performed by: NURSE ANESTHETIST, CERTIFIED REGISTERED

## 2021-12-01 PROCEDURE — 7100000000 HC PACU RECOVERY - FIRST 15 MIN: Performed by: ORTHOPAEDIC SURGERY

## 2021-12-01 PROCEDURE — 7100000001 HC PACU RECOVERY - ADDTL 15 MIN: Performed by: ORTHOPAEDIC SURGERY

## 2021-12-01 PROCEDURE — 6360000002 HC RX W HCPCS: Performed by: ORTHOPAEDIC SURGERY

## 2021-12-01 PROCEDURE — 3700000001 HC ADD 15 MINUTES (ANESTHESIA): Performed by: ORTHOPAEDIC SURGERY

## 2021-12-01 PROCEDURE — 97161 PT EVAL LOW COMPLEX 20 MIN: CPT | Performed by: PHYSICAL THERAPIST

## 2021-12-01 PROCEDURE — 27447 TOTAL KNEE ARTHROPLASTY: CPT | Performed by: ORTHOPAEDIC SURGERY

## 2021-12-01 PROCEDURE — C1776 JOINT DEVICE (IMPLANTABLE): HCPCS | Performed by: ORTHOPAEDIC SURGERY

## 2021-12-01 PROCEDURE — 2720000010 HC SURG SUPPLY STERILE: Performed by: ORTHOPAEDIC SURGERY

## 2021-12-01 PROCEDURE — 88305 TISSUE EXAM BY PATHOLOGIST: CPT

## 2021-12-01 PROCEDURE — 97116 GAIT TRAINING THERAPY: CPT | Performed by: PHYSICAL THERAPIST

## 2021-12-01 PROCEDURE — 2700000000 HC OXYGEN THERAPY PER DAY

## 2021-12-01 PROCEDURE — 2500000003 HC RX 250 WO HCPCS: Performed by: ORTHOPAEDIC SURGERY

## 2021-12-01 PROCEDURE — 6370000000 HC RX 637 (ALT 250 FOR IP): Performed by: NURSE PRACTITIONER

## 2021-12-01 PROCEDURE — 88311 DECALCIFY TISSUE: CPT

## 2021-12-01 PROCEDURE — 2580000003 HC RX 258: Performed by: ANESTHESIOLOGY

## 2021-12-01 PROCEDURE — 73560 X-RAY EXAM OF KNEE 1 OR 2: CPT

## 2021-12-01 PROCEDURE — 3600000015 HC SURGERY LEVEL 5 ADDTL 15MIN: Performed by: ORTHOPAEDIC SURGERY

## 2021-12-01 PROCEDURE — 2709999900 HC NON-CHARGEABLE SUPPLY: Performed by: ORTHOPAEDIC SURGERY

## 2021-12-01 PROCEDURE — 6370000000 HC RX 637 (ALT 250 FOR IP): Performed by: ORTHOPAEDIC SURGERY

## 2021-12-01 PROCEDURE — 2500000003 HC RX 250 WO HCPCS: Performed by: NURSE PRACTITIONER

## 2021-12-01 DEVICE — LEGION PS HIGH FLEX XLPE SZ 5-6 12MM
Type: IMPLANTABLE DEVICE | Status: FUNCTIONAL
Brand: LEGION

## 2021-12-01 DEVICE — GENESIS II NON-POROUS TIBIAL                                    BASEPLATE SIZE 5 LEFT
Type: IMPLANTABLE DEVICE | Status: FUNCTIONAL
Brand: GENESIS II

## 2021-12-01 DEVICE — GEN II 7.5MM RESUR PAT 29MM
Type: IMPLANTABLE DEVICE | Status: FUNCTIONAL
Brand: GENESIS II

## 2021-12-01 DEVICE — KNEE K1 TOT HEMI STD CEM IMPL CAPPED K1 SN: Type: IMPLANTABLE DEVICE | Status: FUNCTIONAL

## 2021-12-01 DEVICE — LEGION POSTERIOR STABILIZED                                    OXINIUM FEMORAL SIZE 5 LEFT
Type: IMPLANTABLE DEVICE | Status: FUNCTIONAL
Brand: LEGION

## 2021-12-01 RX ORDER — POTASSIUM CHLORIDE 20 MEQ/1
40 TABLET, EXTENDED RELEASE ORAL DAILY
Status: DISCONTINUED | OUTPATIENT
Start: 2021-12-01 | End: 2021-12-02 | Stop reason: HOSPADM

## 2021-12-01 RX ORDER — CELECOXIB 100 MG/1
200 CAPSULE ORAL ONCE
Status: COMPLETED | OUTPATIENT
Start: 2021-12-01 | End: 2021-12-01

## 2021-12-01 RX ORDER — CETIRIZINE HYDROCHLORIDE 10 MG/1
10 TABLET ORAL DAILY PRN
Status: DISCONTINUED | OUTPATIENT
Start: 2021-12-01 | End: 2021-12-02 | Stop reason: HOSPADM

## 2021-12-01 RX ORDER — BUPIVACAINE HYDROCHLORIDE 7.5 MG/ML
INJECTION, SOLUTION INTRASPINAL PRN
Status: DISCONTINUED | OUTPATIENT
Start: 2021-12-01 | End: 2021-12-01 | Stop reason: SDUPTHER

## 2021-12-01 RX ORDER — ROPIVACAINE HYDROCHLORIDE 5 MG/ML
INJECTION, SOLUTION EPIDURAL; INFILTRATION; PERINEURAL
Status: COMPLETED | OUTPATIENT
Start: 2021-12-01 | End: 2021-12-01

## 2021-12-01 RX ORDER — OXYCODONE HYDROCHLORIDE 5 MG/1
10 TABLET ORAL EVERY 4 HOURS PRN
Status: DISCONTINUED | OUTPATIENT
Start: 2021-12-01 | End: 2021-12-02 | Stop reason: HOSPADM

## 2021-12-01 RX ORDER — ONDANSETRON 4 MG/1
4 TABLET, ORALLY DISINTEGRATING ORAL EVERY 8 HOURS PRN
Status: DISCONTINUED | OUTPATIENT
Start: 2021-12-01 | End: 2021-12-02 | Stop reason: HOSPADM

## 2021-12-01 RX ORDER — ACETAMINOPHEN 325 MG/1
650 TABLET ORAL EVERY 6 HOURS
Status: DISCONTINUED | OUTPATIENT
Start: 2021-12-02 | End: 2021-12-02 | Stop reason: HOSPADM

## 2021-12-01 RX ORDER — FENTANYL CITRATE 50 UG/ML
100 INJECTION, SOLUTION INTRAMUSCULAR; INTRAVENOUS ONCE
Status: COMPLETED | OUTPATIENT
Start: 2021-12-01 | End: 2021-12-01

## 2021-12-01 RX ORDER — CHLORTHALIDONE 25 MG/1
25 TABLET ORAL NIGHTLY
Status: DISCONTINUED | OUTPATIENT
Start: 2021-12-01 | End: 2021-12-02 | Stop reason: HOSPADM

## 2021-12-01 RX ORDER — DEXAMETHASONE SODIUM PHOSPHATE 10 MG/ML
INJECTION, SOLUTION INTRAMUSCULAR; INTRAVENOUS PRN
Status: DISCONTINUED | OUTPATIENT
Start: 2021-12-01 | End: 2021-12-01

## 2021-12-01 RX ORDER — SODIUM CHLORIDE 0.9 % (FLUSH) 0.9 %
5-40 SYRINGE (ML) INJECTION EVERY 12 HOURS SCHEDULED
Status: DISCONTINUED | OUTPATIENT
Start: 2021-12-01 | End: 2021-12-01 | Stop reason: HOSPADM

## 2021-12-01 RX ORDER — DEXAMETHASONE SODIUM PHOSPHATE 10 MG/ML
8 INJECTION INTRAMUSCULAR; INTRAVENOUS ONCE
Status: COMPLETED | OUTPATIENT
Start: 2021-12-01 | End: 2021-12-01

## 2021-12-01 RX ORDER — ONDANSETRON 2 MG/ML
4 INJECTION INTRAMUSCULAR; INTRAVENOUS EVERY 6 HOURS PRN
Status: DISCONTINUED | OUTPATIENT
Start: 2021-12-01 | End: 2021-12-02 | Stop reason: HOSPADM

## 2021-12-01 RX ORDER — PROPOFOL 10 MG/ML
INJECTION, EMULSION INTRAVENOUS CONTINUOUS PRN
Status: DISCONTINUED | OUTPATIENT
Start: 2021-12-01 | End: 2021-12-01 | Stop reason: SDUPTHER

## 2021-12-01 RX ORDER — MIDAZOLAM HYDROCHLORIDE 1 MG/ML
1 INJECTION INTRAMUSCULAR; INTRAVENOUS EVERY 5 MIN PRN
Status: COMPLETED | OUTPATIENT
Start: 2021-12-01 | End: 2021-12-01

## 2021-12-01 RX ORDER — SODIUM CHLORIDE, SODIUM LACTATE, POTASSIUM CHLORIDE, CALCIUM CHLORIDE 600; 310; 30; 20 MG/100ML; MG/100ML; MG/100ML; MG/100ML
INJECTION, SOLUTION INTRAVENOUS CONTINUOUS
Status: DISCONTINUED | OUTPATIENT
Start: 2021-12-01 | End: 2021-12-01

## 2021-12-01 RX ORDER — CELECOXIB 200 MG/1
200 CAPSULE ORAL 2 TIMES DAILY
Qty: 60 CAPSULE | Refills: 0 | Status: SHIPPED | OUTPATIENT
Start: 2021-12-01 | End: 2022-01-07

## 2021-12-01 RX ORDER — SODIUM CHLORIDE 0.9 % (FLUSH) 0.9 %
5-40 SYRINGE (ML) INJECTION PRN
Status: DISCONTINUED | OUTPATIENT
Start: 2021-12-01 | End: 2021-12-02 | Stop reason: HOSPADM

## 2021-12-01 RX ORDER — ASPIRIN 325 MG
325 TABLET, DELAYED RELEASE (ENTERIC COATED) ORAL 2 TIMES DAILY
Qty: 56 TABLET | Refills: 0 | Status: SHIPPED | OUTPATIENT
Start: 2021-12-01 | End: 2022-01-07

## 2021-12-01 RX ORDER — ATENOLOL AND CHLORTHALIDONE TABLET 50; 25 MG/1; MG/1
1 TABLET ORAL NIGHTLY
Status: DISCONTINUED | OUTPATIENT
Start: 2021-12-01 | End: 2021-12-01 | Stop reason: CLARIF

## 2021-12-01 RX ORDER — ROPIVACAINE HYDROCHLORIDE 5 MG/ML
INJECTION, SOLUTION EPIDURAL; INFILTRATION; PERINEURAL
Status: COMPLETED
Start: 2021-12-01 | End: 2021-12-01

## 2021-12-01 RX ORDER — PREGABALIN 75 MG/1
75 CAPSULE ORAL ONCE
Status: DISCONTINUED | OUTPATIENT
Start: 2021-12-01 | End: 2021-12-01 | Stop reason: HOSPADM

## 2021-12-01 RX ORDER — ACETAMINOPHEN 500 MG
1000 TABLET ORAL ONCE
Status: COMPLETED | OUTPATIENT
Start: 2021-12-01 | End: 2021-12-01

## 2021-12-01 RX ORDER — VANCOMYCIN HYDROCHLORIDE 1 G/20ML
INJECTION, POWDER, LYOPHILIZED, FOR SOLUTION INTRAVENOUS PRN
Status: DISCONTINUED | OUTPATIENT
Start: 2021-12-01 | End: 2021-12-01 | Stop reason: ALTCHOICE

## 2021-12-01 RX ORDER — MORPHINE SULFATE 2 MG/ML
2 INJECTION, SOLUTION INTRAMUSCULAR; INTRAVENOUS
Status: DISCONTINUED | OUTPATIENT
Start: 2021-12-01 | End: 2021-12-02 | Stop reason: HOSPADM

## 2021-12-01 RX ORDER — DEXTROSE AND SODIUM CHLORIDE 5; .45 G/100ML; G/100ML
INJECTION, SOLUTION INTRAVENOUS CONTINUOUS
Status: DISCONTINUED | OUTPATIENT
Start: 2021-12-01 | End: 2021-12-02 | Stop reason: HOSPADM

## 2021-12-01 RX ORDER — ROPIVACAINE HYDROCHLORIDE 5 MG/ML
30 INJECTION, SOLUTION EPIDURAL; INFILTRATION; PERINEURAL
Status: DISCONTINUED | OUTPATIENT
Start: 2021-12-01 | End: 2021-12-01 | Stop reason: HOSPADM

## 2021-12-01 RX ORDER — SODIUM CHLORIDE 9 MG/ML
25 INJECTION, SOLUTION INTRAVENOUS PRN
Status: DISCONTINUED | OUTPATIENT
Start: 2021-12-01 | End: 2021-12-02 | Stop reason: HOSPADM

## 2021-12-01 RX ORDER — ATENOLOL 50 MG/1
50 TABLET ORAL NIGHTLY
Status: DISCONTINUED | OUTPATIENT
Start: 2021-12-01 | End: 2021-12-02 | Stop reason: HOSPADM

## 2021-12-01 RX ORDER — MEPERIDINE HYDROCHLORIDE 25 MG/ML
12.5 INJECTION INTRAMUSCULAR; INTRAVENOUS; SUBCUTANEOUS EVERY 5 MIN PRN
Status: DISCONTINUED | OUTPATIENT
Start: 2021-12-01 | End: 2021-12-01 | Stop reason: HOSPADM

## 2021-12-01 RX ORDER — SODIUM CHLORIDE 9 MG/ML
25 INJECTION, SOLUTION INTRAVENOUS PRN
Status: DISCONTINUED | OUTPATIENT
Start: 2021-12-01 | End: 2021-12-01 | Stop reason: HOSPADM

## 2021-12-01 RX ORDER — SODIUM CHLORIDE 0.9 % (FLUSH) 0.9 %
5-40 SYRINGE (ML) INJECTION EVERY 12 HOURS SCHEDULED
Status: DISCONTINUED | OUTPATIENT
Start: 2021-12-01 | End: 2021-12-02 | Stop reason: HOSPADM

## 2021-12-01 RX ORDER — DEXAMETHASONE SODIUM PHOSPHATE 10 MG/ML
INJECTION, SOLUTION INTRAMUSCULAR; INTRAVENOUS PRN
Status: DISCONTINUED | OUTPATIENT
Start: 2021-12-01 | End: 2021-12-01 | Stop reason: SDUPTHER

## 2021-12-01 RX ORDER — MIDAZOLAM HYDROCHLORIDE 1 MG/ML
INJECTION INTRAMUSCULAR; INTRAVENOUS
Status: COMPLETED
Start: 2021-12-01 | End: 2021-12-01

## 2021-12-01 RX ORDER — FENTANYL CITRATE 50 UG/ML
INJECTION, SOLUTION INTRAMUSCULAR; INTRAVENOUS
Status: COMPLETED
Start: 2021-12-01 | End: 2021-12-01

## 2021-12-01 RX ORDER — SODIUM CHLORIDE 0.9 % (FLUSH) 0.9 %
5-40 SYRINGE (ML) INJECTION PRN
Status: DISCONTINUED | OUTPATIENT
Start: 2021-12-01 | End: 2021-12-01 | Stop reason: HOSPADM

## 2021-12-01 RX ORDER — FENTANYL CITRATE 50 UG/ML
INJECTION, SOLUTION INTRAMUSCULAR; INTRAVENOUS PRN
Status: DISCONTINUED | OUTPATIENT
Start: 2021-12-01 | End: 2021-12-01 | Stop reason: SDUPTHER

## 2021-12-01 RX ORDER — MELOXICAM 7.5 MG/1
7.5 TABLET ORAL DAILY
Status: DISCONTINUED | OUTPATIENT
Start: 2021-12-01 | End: 2021-12-02 | Stop reason: HOSPADM

## 2021-12-01 RX ORDER — ONDANSETRON 2 MG/ML
INJECTION INTRAMUSCULAR; INTRAVENOUS PRN
Status: DISCONTINUED | OUTPATIENT
Start: 2021-12-01 | End: 2021-12-01 | Stop reason: SDUPTHER

## 2021-12-01 RX ORDER — GABAPENTIN 100 MG/1
100 CAPSULE ORAL 3 TIMES DAILY
Qty: 90 CAPSULE | Refills: 0 | Status: SHIPPED | OUTPATIENT
Start: 2021-12-01 | End: 2022-01-07

## 2021-12-01 RX ORDER — OXYCODONE HYDROCHLORIDE AND ACETAMINOPHEN 5; 325 MG/1; MG/1
1 TABLET ORAL EVERY 6 HOURS PRN
Qty: 28 TABLET | Refills: 0 | Status: SHIPPED | OUTPATIENT
Start: 2021-12-01 | End: 2021-12-08

## 2021-12-01 RX ADMIN — PHENYLEPHRINE HYDROCHLORIDE 100 MCG: 10 INJECTION INTRAVENOUS at 11:30

## 2021-12-01 RX ADMIN — FENTANYL CITRATE 100 MCG: 50 INJECTION INTRAMUSCULAR; INTRAVENOUS at 09:30

## 2021-12-01 RX ADMIN — MELOXICAM 7.5 MG: 7.5 TABLET ORAL at 16:46

## 2021-12-01 RX ADMIN — ACETAMINOPHEN 1000 MG: 500 TABLET ORAL at 09:12

## 2021-12-01 RX ADMIN — PHENYLEPHRINE HYDROCHLORIDE 100 MCG: 10 INJECTION INTRAVENOUS at 10:18

## 2021-12-01 RX ADMIN — MIDAZOLAM 1 MG: 1 INJECTION INTRAMUSCULAR; INTRAVENOUS at 09:30

## 2021-12-01 RX ADMIN — SODIUM CHLORIDE, POTASSIUM CHLORIDE, SODIUM LACTATE AND CALCIUM CHLORIDE: 600; 310; 30; 20 INJECTION, SOLUTION INTRAVENOUS at 09:12

## 2021-12-01 RX ADMIN — FENTANYL CITRATE 25 MCG: 50 INJECTION, SOLUTION INTRAMUSCULAR; INTRAVENOUS at 09:53

## 2021-12-01 RX ADMIN — PROPOFOL INJECTABLE EMULSION 50 MCG/KG/MIN: 10 INJECTION, EMULSION INTRAVENOUS at 09:59

## 2021-12-01 RX ADMIN — PHENYLEPHRINE HYDROCHLORIDE 100 MCG: 10 INJECTION INTRAVENOUS at 11:50

## 2021-12-01 RX ADMIN — MIDAZOLAM 1 MG: 1 INJECTION INTRAMUSCULAR; INTRAVENOUS at 09:35

## 2021-12-01 RX ADMIN — DEXTROSE AND SODIUM CHLORIDE: 5; 450 INJECTION, SOLUTION INTRAVENOUS at 16:45

## 2021-12-01 RX ADMIN — VANCOMYCIN HYDROCHLORIDE 1500 MG: 10 INJECTION, POWDER, LYOPHILIZED, FOR SOLUTION INTRAVENOUS at 10:00

## 2021-12-01 RX ADMIN — POTASSIUM CHLORIDE 40 MEQ: 20 TABLET, EXTENDED RELEASE ORAL at 16:46

## 2021-12-01 RX ADMIN — PHENYLEPHRINE HYDROCHLORIDE 100 MCG: 10 INJECTION INTRAVENOUS at 10:55

## 2021-12-01 RX ADMIN — CELECOXIB 200 MG: 100 CAPSULE ORAL at 09:12

## 2021-12-01 RX ADMIN — OXYCODONE 10 MG: 5 TABLET ORAL at 21:06

## 2021-12-01 RX ADMIN — FENTANYL CITRATE 100 MCG: 50 INJECTION, SOLUTION INTRAMUSCULAR; INTRAVENOUS at 09:30

## 2021-12-01 RX ADMIN — DEXAMETHASONE SODIUM PHOSPHATE 8 MG: 10 INJECTION INTRAMUSCULAR; INTRAVENOUS at 09:12

## 2021-12-01 RX ADMIN — SODIUM CHLORIDE, POTASSIUM CHLORIDE, SODIUM LACTATE AND CALCIUM CHLORIDE: 600; 310; 30; 20 INJECTION, SOLUTION INTRAVENOUS at 10:28

## 2021-12-01 RX ADMIN — DEXAMETHASONE SODIUM PHOSPHATE 10 MG: 10 INJECTION, SOLUTION INTRAMUSCULAR; INTRAVENOUS at 10:07

## 2021-12-01 RX ADMIN — ONDANSETRON 4 MG: 2 INJECTION INTRAMUSCULAR; INTRAVENOUS at 10:07

## 2021-12-01 RX ADMIN — Medication 10 ML: at 23:30

## 2021-12-01 RX ADMIN — ROPIVACAINE HYDROCHLORIDE 35 ML: 5 INJECTION, SOLUTION EPIDURAL; INFILTRATION; PERINEURAL at 09:35

## 2021-12-01 RX ADMIN — PHENYLEPHRINE HYDROCHLORIDE 100 MCG: 10 INJECTION INTRAVENOUS at 10:29

## 2021-12-01 RX ADMIN — PHENYLEPHRINE HYDROCHLORIDE 100 MCG: 10 INJECTION INTRAVENOUS at 10:07

## 2021-12-01 RX ADMIN — BUPIVACAINE HYDROCHLORIDE IN DEXTROSE 2 MG: 7.5 INJECTION, SOLUTION SUBARACHNOID at 09:53

## 2021-12-01 ASSESSMENT — PULMONARY FUNCTION TESTS
PIF_VALUE: 0
PIF_VALUE: 1
PIF_VALUE: 0
PIF_VALUE: 1
PIF_VALUE: 0
PIF_VALUE: 1
PIF_VALUE: 1
PIF_VALUE: 0
PIF_VALUE: 1
PIF_VALUE: 0
PIF_VALUE: 1
PIF_VALUE: 0
PIF_VALUE: 1
PIF_VALUE: 0
PIF_VALUE: 1

## 2021-12-01 ASSESSMENT — PAIN SCALES - GENERAL
PAINLEVEL_OUTOF10: 0
PAINLEVEL_OUTOF10: 6
PAINLEVEL_OUTOF10: 0
PAINLEVEL_OUTOF10: 2

## 2021-12-01 ASSESSMENT — LIFESTYLE VARIABLES: SMOKING_STATUS: 0

## 2021-12-01 ASSESSMENT — PAIN - FUNCTIONAL ASSESSMENT: PAIN_FUNCTIONAL_ASSESSMENT: 0-10

## 2021-12-01 NOTE — OP NOTE
Operative Note      Patient: Prosper Crawford  YOB: 1965  MRN: [id number]    Date of Procedure: 12/1/2021    Pre-Op Diagnosis: LEFT KNEE TKA    Post-Op Diagnosis: Same       Procedure(s):  LEFT TOTAL KNEE  ARTHROPLASTY([employer] NEPHEW )    Surgeon(s): Cecile Purcell DO    Assistant:   Resident: Vincenzo Kaplan DO; Gila Wolf DO; Arthur Landa DO    Anesthesia: Spinal    Estimated Blood Loss (mL): Minimal    Complications: None    Specimens:   ID Type Source Tests Collected by Time Destination   A : Bone left knee Tissue Tissue SURGICAL PATHOLOGY Cecile Purcell DO 12/1/2021 1127        Implants:  Implant Name Type Inv. Item Serial No.  Lot No. LRB No. Used Action   BASEPLATE TIB SZ 5 [id number] THK2. 3MM L KNEE TI ALLY NP  BASEPLATE TIB SZ 5 [id number] ML74MM THK2. 3MM L KNEE TI ALLY NP  [employer]- [id number] Left 1 Implanted   COMPONENT FEM SZ 5 L KNEE OXINIUM POST STBL VINCE LEGION  COMPONENT FEM SZ 5 L KNEE OXINIUM POST STBL VINCE LEGION  [employer] [place] [employer] [id number] Left 1 Implanted   INSERT TIB SZ 5-6 [id number] POST KNEE POLYETH POST STBL HI  INSERT TIB SZ 5-6 [id number] POST KNEE POLYETH POST STBL HI  [employer] [facility]  Left 1 Implanted   COMPONENT PAT [id number] 5MM KNEE POLY RND RESURF GEN II  COMPONENT PAT [id number] THK7. 5MM KNEE POLY RND RESURF GEN II  [employer] [place] [employer] [id number] Left 1 Implanted         Drains: * No LDAs found *    Findings: as above    Detailed Description of Procedure:   below    Department of Orthopedic Surgery  Operative Report        Pre-operative Diagnosis:  Left Knee Osteoarthritis    Post-operative Diagnosis:  Left Knee Osteoarthritis    Procedure:  Left Knee Arthroplasty    Surgeon:  Cecile Purcell DO     Assistant(s):  As above    Anesthesia:  Spinal anesthesia    Estimated blood loss:  Minimal    Specimens:  As above    Implants:  As above    Complications:  none    Condition: Stable    Brief Hospital Course:  Roland Talbot is a patient known to Radha Aldridge DO's practice with persistent complaints of Left knee pain. Knee pain has failed to be relieved by non-operative conservative measures, and has began affecting daily activities of living. After examination of the patient, review of the radiologic studies, and appropriate pre-operative risk assessment, Radha Aldridge DO recommended Left knee arthroplasty, which the patient was agreeable towards. Operative Course: The patient was seen and identified outside the operative suite, in which the operative site was marked as appropriate by patient, surgeon, staff, and anesthesia. The patient was then taken into the operative suite, transferred to the operative table with all bony prominences and neurovascular structures well padded and protected. A tourniquet was placed high on the proximal thigh of the operative extremity. The patient was sedated under the care of the anesthesia team. The operative site was prepped and draped in standard sterile fashion. The tourniquet was inflated to 300 mmHg. An anterior midline incision was made over the knee with full-thickness flaps reflected revealing the anterior joint capsule. Medial parapatellar arthrotomy was performed reflecting the patella laterally. Anterior fat pad and anterior horns of the lateral and medial meniscus were sharply excised. The knee was flexed up. Anterior cruciate ligament and posterior cruciate ligament were then excised. All osteophytes on the distal femur were removed with rongeur. At this point, drilling of the intramedullary canal of the distal femur was then performed. Distal femoral cutting jig was then placed and pinned in appropriate position. An oscillating saw was used to make the distal femoral cut, discarding the pieces of cut femoral bone and sent for study.  The posterior reference guide was then placed on the distal femur after the intramedullary Distal femoral component was removed, trial poly was then removed. Keel punch was then performed of the proximal tibial. Tibial base plate was then removed. Copious irrigation was performed of the wound and final inspection for all interposed soft tissue and loose bodies was then performed as cement was being mixed. Copious irrigation was performed once again of the knee. Cement was placed on the proximal tibial component and the tibial component was then impacted into appropriate position with all excess extruded cement being removed with Jewett elevator. A polyethylene component was then impacted into appropriate position and checked for stability, which it was stable. Cement was then placed on the distal femoral component. Distal femoral component was then impacted in appropriate position with all excess extruded cement being removed with a Jewett elevator. The knee was extended, taken through a range of motion, and found to be appropriately stable. Final inspection for all excess extruded cement was then performed. Jewett elevator removed all excess extruded cement. Copious irrigation was performed of the knee. The knee was then soaked with a bedadine solution soak for 3 minutes. The knee was then throughly irrigated with saline solution. Then 1 gram of vanomycin powder was placed within the wound. The capsule was  then closed with strata-fix closure. I then injected our TXA mixture into the knee joint. Copious irrigation was performed of this layer followed by, 2-0 Vicryl for the subcutaneous tissues, and skin staples was used to secure incision. A sterile layered dressing was placed over the wound. Tourniquet was deflated. The patient was awakened from anesthesia, transferred to the hospital bed, and taken to the PACU in stable condition. Disposition: The patient was taken to PACU in stable condition. Once stable, the patient will be transferred to the floor.  Orders have been provided to begin physical therapy, weight bear as tolerated Left lower extremity. Patient received a dose of antibiotics preoperatively. We will continue this for 24 hours postoperatively for infection prophylaxis. The patient will also be started on asa for DVT prophylaxis. We have consulted  and case management for discharge planning and consulted the PCP for medical management.        Electronically signed by Maty Bran DO on 12/1/2021 at 11:38 AM

## 2021-12-01 NOTE — ANESTHESIA PROCEDURE NOTES
Spinal Block    Patient location during procedure: OR  Start time: 12/1/2021 9:46 AM  End time: 12/1/2021 9:53 AM  Reason for block: primary anesthetic  Staffing  Performed: resident/CRNA   Anesthesiologist: Liz Shelton MD  Resident/CRNA: JERE Brewster CRNA  Spinal Block  Patient position: sitting  Prep: Betadine  Patient monitoring: cardiac monitor, continuous pulse ox, continuous capnometry and frequent blood pressure checks  Approach: midline  Provider prep: mask and sterile gloves  Local infiltration: lidocaine  Dose: 0.5  Agent: bupivacaine  Adjuvant: fentanyl  Dose: 2  Dose: 2  Needle  Needle type: Pencan   Needle gauge: 25 G  Needle length: 3.5 in  Assessment  Sensory level: T6  Swirl obtained: Yes  CSF: clear  Attempts: 1

## 2021-12-01 NOTE — ANESTHESIA POSTPROCEDURE EVALUATION
Department of Anesthesiology  Postprocedure Note    Patient: Dara Villalpando  MRN: [id number]  YOB: 1965  Date of evaluation: 12/1/2021  Time:  12:18 PM     Procedure Summary     Date: 12/01/21 Room / Location: [address] [place] [address] / [address] [place] [address]    Anesthesia Start: 8362 Anesthesia Stop: 8151    Procedure: LEFT TOTAL KNEE  ARTHROPLASTY([facility] & NEPHEW ) (Left Knee) Diagnosis: (LEFT KNEE TKA)    Surgeons: Muriel Kohli DO Responsible Provider: Saurav Rolon MD    Anesthesia Type: spinal ASA Status: 3          Anesthesia Type: spinal    Reena Phase I: Reena Score: 10    Reena Phase II:      Last vitals: Reviewed and per EMR flowsheets.        Anesthesia Post Evaluation    Patient location during evaluation: PACU  Patient participation: complete - patient participated  Level of consciousness: awake  Airway patency: patent  Nausea & Vomiting: no nausea and no vomiting  Complications: no  Cardiovascular status: hemodynamically stable  Respiratory status: acceptable

## 2021-12-01 NOTE — H&P
 Drug use: Never    Sexual activity: Not on file   Lifestyle    Physical activity       Days per week: Not on file       Minutes per session: Not on file    Stress: Not on file   Relationships    Social connections       Talks on phone: Not on file       Gets together: Not on file       Attends Mosque service: Not on file       Active member of club or organization: Not on file       Attends meetings of clubs or organizations: Not on file       Relationship status: Not on file    Intimate partner violence       Fear of current or ex partner: Not on file       Emotionally abused: Not on file       Physically abused: Not on file       Forced sexual activity: Not on file   Other Topics Concern    Not on file   Social History Narrative    Not on file          Family History             Family History   Problem Relation Age of Onset    Diabetes Mother      Hypertension Mother      High Cholesterol Father              Review of Systems:      Skin: (-) rash,(-) psoriasis,(-) eczema, (-)skin cancer. Musculoskeletal: (-) fractures,  (-) dislocations,(-) collagen vascular disease, (-) fibromyalgia, (-) multiple sclerosis, (-) muscular dystrophy, (-) RSD,(-) joint pain (-)swelling, (-) joint pain,swelling. Neurologic: (-) epilepsy, (-)seizures,(-) brain tumor,(-) TIA, (-)stroke, (-)headaches, (-)Parkinson disease,(-) memory loss, (-) LOC.   Cardiovascular: (-) Chest pain, (-) swelling in legs/feet, (-) SOB, (-) cramping in legs/feet with walking.     Constitutional:      Vital signs are stable.  In general, patient is awake, alert and oriented X3, in no apparent distress.  Examination of HENT reveals normocephalic, atraumatic.  PERRLA/EOMI sclera are white.  Conjunctivae are clear.  TM's are intact.  Pharynx is pink and moist.  Uvula and tongue are midline.  Heart: Positive S1 and positive S2 with regular rate and rhythm.  Lungs: Clear to auscultation bilaterally without rales, rhonchi or wheezes.  Abdomen: mild, there is tenderness over the  medial region, there are not any masses, there is not ligamentous instability, there is  Varus deformity noted. Knee exam: bilateral positive for moderate crepitations, some mild tenderness laxity is not noted with  stress.       R. Knee:  Lachman's negative, Anterior Drawer negative, Posterior Drawer negative  Tigre's positive, Thallasy  positive,   PF grind test positive, Apprehension test negative, Patellar J sign  negative  L. Knee:  Lachman's negative, Anterior Drawer negative, Posterior Drawer negative  Tigre's positive, Thallasy  positive,   PF grind test positive, Apprehension test negative,  Patellar J sign  negative     Xrays:   Right knee demonstrates grade 2/3 osteoarthritis in the medial compartment. Grade 1/2 patellofemoral osteoarthritis.       Left knee grade 4 osteoarthritis in the medial compartment.                MRI:   n/a  Radiographic findings reviewed with patient     Impression:          Encounter Diagnoses   Name Primary?  Primary osteoarthritis of right knee Yes    Primary osteoarthritis of left knee           Plan:   Natural history and expected course discussed. Questions answered. Educational materials distributed. Rest, ice, compression, and elevation (RICE) therapy. Reduction in offending activity. Patellar compression sleeve.   I had a lengthy discussion with the patient regarding their diagnosis. I explained treatment options including surgical vs non surgical treatment. I reviewed in detail the risks and benefits and outlined the procedure in detail with expected outcomes and possible complications.  I also discussed non surgical treatment such as injections (CSI and visco supplementation), physical therapy, topical creams and NSAID's. They have elected for surgical management at this time.    We discussed various treatment options both surgical and non-surgical.   The patient is unable to ambulate more than 100 feet and is unable to perform the average daily activities including:  Light housework, ADLs, donning clothes, toileting and exercise.  Patient has failed previous conservative measures including cortisone injections, NSAIDs, PT, HEP and pain medication and is currently a fall risk to the disability and decreased functioning.  The patient wishes to have the total knee arthroplasty. The risks and benefits of a total knee replacement were discussed with the patient.  The risks include but are not limited to: infection, injury to blood vessels and nerves, non relief of symptoms, arthrofibrosis of knee, aseptic loosening of prosthesis, intraoperative fracture, blood loss, PE/DVT, MI, dislocation of hip and knee, need for further operative intervention and death.       The patient was counseled at length about the risks of mayra Covid-19 during their perioperative period and any recovery window from their procedure.  The patient was made aware that mayra Covid-19  may worsen their prognosis for recovering from their procedure  and lend to a higher morbidity and/or mortality risk.  All material risks, benefits, and reasonable alternatives including postponing the procedure were discussed.  The patient does wish to proceed with the procedure at this time.

## 2021-12-01 NOTE — ANESTHESIA PRE PROCEDURE
Department of Anesthesiology  Preprocedure Note       Name:  Saadia Hendrickson   Age:  49 y.o.  :  1965                                          MRN:  [id number]         Date:  2021      Surgeon: Freddie Krueger): Christopher Lopez DO    Procedure: Procedure(s):  LEFT TOTAL KNEE  ARTHROPLASTY(OFWLER & NEPHEW )    Medications prior to admission:   Prior to Admission medications    Medication Sig Start Date End Date Taking? Authorizing Provider   cephALEXin (KEFLEX) 500 MG capsule Take 4 capsules by mouth daily as needed (take 2 g, 1 hour prior to dental procedure) 21   Christopher Lopez,    aspirin 325 MG EC tablet Take 1 tablet by mouth 2 times daily 21  Lora Flow, DO   cetirizine (ZYRTEC) 10 MG tablet Take 10 mg by mouth daily as needed     Historical Provider, MD   Elastic Bandages & Supports (KNEE BRACE ADJUSTABLE HINGED) MISC Wear brace when active 12/3/19   Christopher Lopez DO   KLOR-CON M20 20 MEQ extended release tablet Take 40 mEq by mouth daily  3/29/18   Historical Provider, MD   atenolol-chlorthalidone (TENORETIC) 50-25 MG per tablet Take 1 tablet by mouth nightly  18   Historical Provider, MD   citalopram (CELEXA) 40 MG tablet Take 40 mg by mouth nightly     Historical Provider, MD       Current medications:    No current facility-administered medications for this visit. No current outpatient medications on file.      Facility-Administered Medications Ordered in Other Visits   Medication Dose Route Frequency Provider Last Rate Last Admin    ortho mix injection   Injection On Call Estanisla Getting, APRN - CNP        acetaminophen (TYLENOL) tablet 1,000 mg  1,000 mg Oral Once Estanislado Getting, APRN - CNP        pregabalin (LYRICA) capsule 75 mg  75 mg Oral Once Estanislado Getting, APRN - CNP        celecoxib (CELEBREX) capsule 200 mg  200 mg Oral Once Estanislado Getting, APRN - CNP        dexamethasone (DECADRON) injection 8 mg  8 mg IntraVENous Once [place] L Therisa [place], APRN - CNP        sodium chloride flush 0.9 % injection 5-40 mL  5-40 mL IntraVENous 2 times per day Staceytristen Als, APRN - CNP        sodium chloride flush 0.9 % injection 5-40 mL  5-40 mL IntraVENous PRN Staceytristen Als, APRN - CNP        0.9 % sodium chloride infusion  25 mL IntraVENous PRN Staceytristen Als, APRN - CNP        vancomycin (VANCOCIN) 1,500 mg in dextrose 5 % 300 mL IVPB  1,500 mg IntraVENous On Call to [facility], APRN - CNP        lactated ringers infusion   IntraVENous Continuous Liz Shelton MD        fentaNYL (SUBLIMAZE) injection 100 mcg  100 mcg IntraVENous Once Opal Hartmann MD        midazolam (VERSED) injection 1 mg  1 mg IntraVENous Q5 Min PRN Opal Hartmann MD        ropivacaine (NAROPIN) 0.5% injection 30 mL  30 mL Infiltration Once PRN Opal Hartmann MD           Allergies: Allergies   Allergen Reactions    Pcn [Penicillins] Hives     Unsure  As a child    Ace Inhibitors Other (See Comments)     unknown    Adhesive Tape Dermatitis       Problem List:    Patient Active Problem List   Diagnosis Code    Primary osteoarthritis of right knee M17.11    Primary osteoarthritis of left knee M17.12    Primary osteoarthritis of one knee, left M17.12       Past Medical History:        Diagnosis Date    Arthritis     Depression     HTN (hypertension)     Seasonal allergies        Past Surgical History:        Procedure Laterality Date    CHOLECYSTECTOMY      COLONOSCOPY      DILATION AND CURETTAGE OF UTERUS      TONSILLECTOMY      TOTAL KNEE ARTHROPLASTY Right 5/26/2021    RIGHT KNEE TOTAL KNEE ARTHROPLASTY [facility]PHE[facility]) performed by Francisco Vasquez DO at [address] History:    Social History     Tobacco Use    Smoking status: Never Smoker    Smokeless tobacco: Never Used   Substance Use Topics    Alcohol use: No                                Counseling given: Not Answered      Vital Signs (Current):    There were no vitals filed for this visit. BP Readings from Last 3 Encounters:   12/01/21 130/69   11/29/21 127/75   05/27/21 133/64       NPO Status:                                                                                 BMI:   Wt Readings from Last 3 Encounters:   11/29/21 241 lb (109.3 kg)   09/09/21 235 lb (106.6 kg)   07/08/21 241 lb (109.3 kg)     There is no height or weight on file to calculate BMI.    CBC:   Lab Results   Component Value Date    WBC 6.5 11/29/2021    RBC 4.25 11/29/2021    HGB 13.1 11/29/2021    HCT 40.5 11/29/2021    MCV 95.3 11/29/2021    RDW 13.2 11/29/2021     11/29/2021       CMP:   Lab Results   Component Value Date     11/29/2021    K 3.4 11/29/2021     11/29/2021    CO2 28 11/29/2021    BUN 11 11/29/2021    CREATININE 0.8 11/29/2021    GFRAA >60 11/29/2021    LABGLOM >60 11/29/2021    GLUCOSE 91 11/29/2021    PROT 7.5 11/29/2021    CALCIUM 9.5 11/29/2021    BILITOT 0.5 11/29/2021    ALKPHOS 67 11/29/2021    AST 24 11/29/2021    ALT 22 11/29/2021       POC Tests: No results for input(s): POCGLU, POCNA, POCK, POCCL, POCBUN, POCHEMO, POCHCT in the last 72 hours.     Coags:   Lab Results   Component Value Date    PROTIME 11.9 11/29/2021    INR 1.0 11/29/2021    APTT 28.0 11/29/2021       HCG (If Applicable): No results found for: PREGTESTUR, PREGSERUM, HCG, HCGQUANT     ABGs: No results found for: PHART, PO2ART, IKO2AIM, LHR5JGV, BEART, D4DIFBAT     Type & Screen (If Applicable):  No results found for: LABABO, LABRH    Drug/Infectious Status (If Applicable):  No results found for: HIV, HEPCAB    COVID-19 Screening (If Applicable): No results found for: COVID19        Anesthesia Evaluation  Patient summary reviewed no history of anesthetic complications:   Airway: Mallampati: I  TM distance: >3 FB   Neck ROM: full  Mouth opening: > = 3 FB Dental:          Pulmonary:Negative Pulmonary ROS breath sounds clear to auscultation      (-) not a current smoker          Patient did not smoke on day of surgery. ROS comment: Seasonal Allergies. Cardiovascular:    (+) hypertension:,       ECG reviewed  Rhythm: regular  Rate: normal           Beta Blocker:  Dose within 24 Hrs      ROS comment: EKG: Normal Sinus Rhythm 68. Neuro/Psych:   (+) psychiatric history:            GI/Hepatic/Renal: Neg GI/Hepatic/Renal ROS  (+) morbid obesity          Endo/Other:    (+) : arthritis: OA., .                 Abdominal:   (+) obese,           Vascular: negative vascular ROS. Other Findings:               Anesthesia Plan      spinal     ASA 3     (Single shot Right Adductor Canal Nerve Block)  Induction: intravenous. MIPS: Postoperative opioids intended. Anesthetic plan and risks discussed with patient. Plan discussed with surgical team.                  Karly Funk MD   5/21/2021    DOS STAFF ADDENDUM:    Pt seen and examined, chart reviewed (including anesthesia, drug and allergy history). Anesthetic plan, risks, benefits, alternatives, and personnel involved discussed with patient. Patient verbalized an understanding and agrees to proceed. Plan discussed with care team members and agreed upon.     Karly Funk MD  Staff Anesthesiologist  8:45 AM     Patient seen and evaluated  [facility], APRN - CRNA

## 2021-12-01 NOTE — ANESTHESIA PROCEDURE NOTES
Peripheral Block    Patient location during procedure: PACU  Start time: 12/1/2021 9:32 AM  End time: 12/1/2021 9:37 AM  Staffing  Anesthesiologist: Hannah Islas MD  Preanesthetic Checklist  Completed: patient identified, IV checked, site marked, risks and benefits discussed, surgical consent, monitors and equipment checked, pre-op evaluation, timeout performed, anesthesia consent given, oxygen available and patient being monitored  Peripheral Block  Patient position: supine  Prep: ChloraPrep  Patient monitoring: cardiac monitor, continuous pulse ox, frequent blood pressure checks and IV access  Block type: Femoral  Laterality: left  Injection technique: single-shot  Guidance: ultrasound guided  Local infiltration: lidocaine  Adductor canal  Provider prep: mask and sterile gloves  Local infiltration: lidocaine  Needle  Needle type: combined needle/nerve stimulator   Needle gauge: 20 G  Needle length: 10 cm  Needle localization: ultrasound guidance  Needle insertion depth: 8 cm  Assessment  Injection assessment: negative aspiration for heme, no paresthesia on injection and local visualized surrounding nerve on ultrasound  Slow fractionated injection: yes  Hemodynamics: stable  Additional Notes  Versed 2 mgm IV abnd Fentanyl 100 microgram IV, Left groin prepared. Left groin and medial thigh are sterilized and draped. Ultra sound picture at the junction of the upper third and the lower two third of medial thigh, till we got a picture of the femoral artery and vein, then I passed the stimuplex needle on the proximal side of the femoral artery and injected 35 ccof Ropivicaine  6.0%  No complications.         Medications Administered  Ropivacaine (NAROPIN) injection 0.5%, 35 mL  Reason for block: post-op pain management and at surgeon's request

## 2021-12-01 NOTE — PROGRESS NOTES
Physical Therapy /Plan of Care    Room #:  0320/0320-01  Patient Name: Garry Lipscomb  YOB: 1965  MRN: [id number]    Date of Service: 12/1/2021     Tentative placement recommendation: [address] [facility] 5 days a week   Equipment recommendation: Patient has needed equipment       Evaluating Physical Therapist: Kannan Porras  #[id number]     Specific Provider Orders/Date/Referring Provider :  12/01/21 0915   PT eval and treat Start: 12/01/21 0915, End: 12/01/21 0915, ONE TIME, Standing Count: 1 Occurrences, R    Fatmata Motta DO      Admitting Diagnosis:   Primary osteoarthritis of one knee, left [M17.12]   Visit diagnosis:   Surgery:     left Total knee arthroplasty (TKA)    Patient Active Problem List   Diagnosis    Primary osteoarthritis of right knee    Primary osteoarthritis of left knee    Primary osteoarthritis of one knee, left       ASSESSMENT of current deficits: Patient exhibits decreased strength, balance, endurance, range of motion and pain left thigh impairing functional mobility, transfers, gait , gait distance and tolerance to activity are barriers to d/c and require skilled intervention during hospital stay to attain pre hospital level of function. Decreased strength, balance and endurance  increases patient's risk for fall. PHYSICAL THERAPY  PLAN OF CARE       Physical therapy plan of care is established based on physician order,  patient diagnosis and clinical assessment    Current Treatment Recommendations:    -Standing Balance: Perform strengthening exercises in standing to promote motor control with or without upper extremity support   -Transfers: Provide instruction on proper hand and foot position for adequate transfer of weight onto lower extremities and use of gait device if needed and Cues for hand placement, technique and safety.  Provide stabilization to prevent fall   -Gait: Gait training and Standing activities to improve: base of support, weight shift, weight bearing    -Endurance: Utilize Supervised activities to increase level of endurance to allow for safe functional mobility including transfers and gait   -Stairs: Stair training with instruction on proper technique and hand placement on rail    PT long term treatment goals are located in below grid    Patient and or family understand(s) diagnosis, prognosis, and plan of care. Frequency of treatments: Patient will be seen  twice daily  for therapeutic exercise, functional retraining, endurance activities, balance exercises, family and patient education.        Prior Level of Function: Patient ambulated independently    Rehab Potential: good    for baseline    Past medical history:   Past Medical History:   Diagnosis Date    Arthritis     Depression     HTN (hypertension)     Seasonal allergies      Past Surgical History:   Procedure Laterality Date    CHOLECYSTECTOMY      COLONOSCOPY      DILATION AND CURETTAGE OF UTERUS      TONSILLECTOMY      TOTAL KNEE ARTHROPLASTY Right 5/26/2021    RIGHT KNEE TOTAL KNEE ARTHROPLASTY [facility] & NEPH) performed by Harvey Bowser DO at [address]:    Precautions:  Ambulate patient , falls, alarm and O2 ,Left FWB (full weight bearing)     Social history: Patient lives with spouse in a ranch home  with 3 steps  to enter with Rail  Tub shower grab bars    Equipment owned: Jo Ann Cheng and Bedside commode,       [address] [facility]   How much difficulty turning over in bed?: None  How much difficulty sitting down on / standing up from a chair with arms?: A Lot  How much difficulty moving from lying on back to sitting on side of bed?: A Little  How much help from another person moving to and from a bed to a chair?: A Little  How much help from another person needed to walk in hospital room?: A Little  How much help from another person for climbing 3-5 steps with a railing?: A Lot  AM-PAC Inpatient Mobility Raw Score : 17  AM-PAC Inpatient T-Scale Score : 42.13  Mobility Inpatient CMS 0-100% Score: 50.57  Mobility Inpatient CMS G-Code Modifier : CK    Nursing cleared patient for PT evaluation. The admitting diagnosis and active problem list as listed above have been reviewed prior to the initiation of this evaluation. OBJECTIVE:   Initial Evaluation  Date: 12/1/2021 Treatment Date: Short Term/ Long Term   Goals   Was pt agreeable to Eval/treatment? Yes     Pain Level  2/10   Left knee        Bed Mobility  Rolling: Supervision     Supine to sit: Supervision     Sit to supine: Minimal assist of 1    Scooting: Supervision     Rolling: Independent    Supine to sit: Independent    Sit to supine: Independent    Scooting: Independent     Transfers Sit to stand:  Moderate assist of 1 from commode and min a from bed   Sit to stand: Modified Independent     Ambulation    2x25 feet using  wheeled walker with Minimal assist of 1   for walker approximation, weight shift, multiplane instability, increased weight bearing Left, Left extension in stance phase of gait and increased Left hip and knee flexion in swing phase of gait, Patient with shuffling steps and cues for sequencing, upright posture, walker approximation, safety and proper hand placement  100 feet using  wheeled walker with Modified Independent    Stair negotiation: ascended and descended   Not assessed       3 steps full weight bearing rail and crutch vs cane   ROM Within functional limits except Left knee 0-80- °     Increase range of motion 10% of affected joints    Strength Within functional limits  except  Left lower extremity 3/5  Within functional limits   Balance Sitting EOB:  good     Dynamic Standing:  fair    wheeled walker   Sitting EOB:  good    Dynamic Standing:  good wheeled walker      Patient is Alert & Oriented x person, place, time and situation and follows directions    Sensation:  Patient reports numbness/tingling and perineum      Edema:  yes left lower extremity        Patient education  Patient educated on role of Physical Therapy, risks of immobility, safety and plan of care,  importance of mobility while in hospital , ankle pumps, quad set and glut set for edema control, blood clot prevention, importance and purpose of adaptive device and adjusted to proper height for the patient. , safety , weight bearing status  and left knee extension in bed and during stance phase of gait      Patient response to education:   Pt verbalized understanding Pt demonstrated skill Pt requires further education in this area   Yes Partial Yes       Treatment:  Patient practiced and was instructed in the following treatment:     Therapist educated and facilitated patient on techniques to increase safety and independence with bed mobility, balance, functional transfers, and functional mobility. Instruction knee extension in bed with kneecap and toes pointing to ceiling  Instruction and performance of incentive inspirometer. Patient performed ankle pumps, quad sets, glut sets x 15-20 each. Active assist heelslide, hip abduction/adduction, straight leg raise x 10 each    Sat edge of bed 10 minutes with Supervision  to increase dynamic sitting balance and activity tolerance. At end of session, patient in bed with alarm call light and phone within reach,   all lines and tubes intact, nursing notified. Patient would benefit from continued skilled Physical Therapy to improve functional independence and quality of life. Patient's/ family goals   home      Patient and or family understand(s) diagnosis, prognosis, and plan of care. Frequency of treatments: Patient will be seen  twice daily  for therapeutic exercise, functional retraining, endurance activities, balance exercises, family and patient education.      Time in  417  Time out  451    Total Treatment Time  14 minutes    Evaluation time includes thorough review of current medical information, gathering

## 2021-12-02 VITALS
RESPIRATION RATE: 16 BRPM | DIASTOLIC BLOOD PRESSURE: 62 MMHG | HEIGHT: 64 IN | OXYGEN SATURATION: 97 % | SYSTOLIC BLOOD PRESSURE: 116 MMHG | BODY MASS INDEX: 41.37 KG/M2 | HEART RATE: 88 BPM | TEMPERATURE: 98.3 F

## 2021-12-02 LAB
HCT VFR BLD CALC: 33.4 % (ref 34–48)
HEMOGLOBIN: 11 G/DL (ref 11.5–15.5)

## 2021-12-02 PROCEDURE — 2580000003 HC RX 258: Performed by: ORTHOPAEDIC SURGERY

## 2021-12-02 PROCEDURE — 97165 OT EVAL LOW COMPLEX 30 MIN: CPT

## 2021-12-02 PROCEDURE — 97530 THERAPEUTIC ACTIVITIES: CPT

## 2021-12-02 PROCEDURE — 97110 THERAPEUTIC EXERCISES: CPT

## 2021-12-02 PROCEDURE — 6360000002 HC RX W HCPCS: Performed by: ORTHOPAEDIC SURGERY

## 2021-12-02 PROCEDURE — 6370000000 HC RX 637 (ALT 250 FOR IP): Performed by: ORTHOPAEDIC SURGERY

## 2021-12-02 PROCEDURE — 97535 SELF CARE MNGMENT TRAINING: CPT

## 2021-12-02 PROCEDURE — 85014 HEMATOCRIT: CPT

## 2021-12-02 PROCEDURE — 36415 COLL VENOUS BLD VENIPUNCTURE: CPT

## 2021-12-02 PROCEDURE — 85018 HEMOGLOBIN: CPT

## 2021-12-02 RX ADMIN — OXYCODONE 10 MG: 5 TABLET ORAL at 13:11

## 2021-12-02 RX ADMIN — VANCOMYCIN HYDROCHLORIDE 1000 MG: 1 INJECTION, POWDER, LYOPHILIZED, FOR SOLUTION INTRAVENOUS at 00:00

## 2021-12-02 RX ADMIN — ACETAMINOPHEN 650 MG: 325 TABLET ORAL at 00:00

## 2021-12-02 RX ADMIN — OXYCODONE 10 MG: 5 TABLET ORAL at 09:17

## 2021-12-02 RX ADMIN — ACETAMINOPHEN 650 MG: 325 TABLET ORAL at 05:42

## 2021-12-02 RX ADMIN — ASPIRIN 325 MG: 325 TABLET, COATED ORAL at 09:17

## 2021-12-02 RX ADMIN — VANCOMYCIN HYDROCHLORIDE 1000 MG: 1 INJECTION, POWDER, LYOPHILIZED, FOR SOLUTION INTRAVENOUS at 11:12

## 2021-12-02 ASSESSMENT — PAIN SCALES - GENERAL
PAINLEVEL_OUTOF10: 8
PAINLEVEL_OUTOF10: 0
PAINLEVEL_OUTOF10: 8
PAINLEVEL_OUTOF10: 4
PAINLEVEL_OUTOF10: 3

## 2021-12-02 ASSESSMENT — PAIN DESCRIPTION - DESCRIPTORS: DESCRIPTORS: DISCOMFORT

## 2021-12-02 ASSESSMENT — PAIN DESCRIPTION - PROGRESSION: CLINICAL_PROGRESSION: GRADUALLY IMPROVING

## 2021-12-02 ASSESSMENT — PAIN DESCRIPTION - PAIN TYPE: TYPE: SURGICAL PAIN

## 2021-12-02 ASSESSMENT — PAIN DESCRIPTION - LOCATION: LOCATION: KNEE

## 2021-12-02 ASSESSMENT — PAIN DESCRIPTION - ORIENTATION: ORIENTATION: LEFT

## 2021-12-02 NOTE — CONSULTS
mouth every 6 hours as needed for Pain for up to 7 days. Intended supply: 7 days. Take lowest dose possible to manage pain 12/1/21 12/8/21 Yes Aletha Soles, DO   cephALEXin (KEFLEX) 500 MG capsule Take 4 capsules by mouth daily as needed (take 2 g, 1 hour prior to dental procedure) 9/9/21   Ladi Morris, DO   cetirizine (ZYRTEC) 10 MG tablet Take 10 mg by mouth daily as needed     Historical Provider, MD   Elastic Bandages & Supports (KNEE BRACE ADJUSTABLE HINGED) MISC Wear brace when active 12/3/19   Ladi Morris, DO   KLOR-CON M20 20 MEQ extended release tablet Take 40 mEq by mouth daily  3/29/18   Historical Provider, MD   atenolol-chlorthalidone (TENORETIC) 50-25 MG per tablet Take 1 tablet by mouth nightly  5/16/18   Historical Provider, MD   citalopram (CELEXA) 40 MG tablet Take 40 mg by mouth nightly     Historical Provider, MD       ALLERGIES:  Pcn [penicillins], Ace inhibitors, and Adhesive tape    SOCIAL Hx:  Social History     Socioeconomic History    Marital status:      Spouse name: Not on file    Number of children: Not on file    Years of education: Not on file    Highest education level: Not on file   Occupational History    Not on file   Tobacco Use    Smoking status: Never Smoker    Smokeless tobacco: Never Used   Vaping Use    Vaping Use: Never used   Substance and Sexual Activity    Alcohol use: No    Drug use: Never    Sexual activity: Not on file   Other Topics Concern    Not on file   Social History Narrative    Not on file     Social Determinants of Health     Financial Resource Strain:     Difficulty of Paying Living Expenses: Not on file   Food Insecurity:     Worried About [address] in the Last Year: Not on file    Mar[place] of Food in the Last Year: Not on file   Transportation Needs:     Lack of Transportation (Medical): Not on file    Lack of Transportation (Non-Medical):  Not on file   Physical Activity:     Days of Exercise per Week: Not on file   [employer] of Exercise per Session: Not on file   Stress:     Feeling of Stress : Not on file   Social Connections:     Frequency of Communication with Friends and Family: Not on file    Frequency of Social Gatherings with Friends and Family: Not on file    Attends Protestant Services: Not on file    Active Member of Clubs or Organizations: Not on file    Attends Club or Organization Meetings: Not on file    Marital Status: Not on file   Intimate Partner Violence:     Fear of Current or Ex-Partner: Not on file    Emotionally Abused: Not on file    Physically Abused: Not on file    Sexually Abused: Not on file   Housing Stability:     Unable to Pay for Housing in the Last Year: Not on file    Number of [place] in the Last Year: Not on file    Unstable Housing in the Last Year: Not on file       ROS: Positive in bold  General:   Denies chills, fatigue, fever, malaise, night sweats or weight loss    Psychological:   Denies anxiety, disorientation or hallucinations    ENT:    Denies epistaxis, headaches, vertigo or visual changes    Cardiovascular:   Denies any chest pain, irregular heartbeats, or palpitations. No paroxysmal nocturnal dyspnea. Respiratory:   Denies shortness of breath, coughing, sputum production, hemoptysis, or wheezing. No orthopnea. Gastrointestinal:   Denies nausea, vomiting, diarrhea, or constipation. Denies any abdominal pain. Denies change in bowel habits or stools. Genito-Urinary:    Denies any urgency, frequency, hematuria. Voiding without difficulty. Musculoskeletal:   Denies joint pain, joint stiffness, joint swelling or muscle pain    Neurology:    Denies any headache or focal neurological deficits. No weakness or paresthesia. Derm:    Denies any rashes, ulcers, or excoriations. Denies bruising. Extremities:   Denies any lower extremity swelling or edema.       PHYSICAL EXAM: Abnormal findings noted  VITALS:  Vitals:    12/01/21 1902   BP: 103/68   Pulse: 96   Resp: 18   Temp: 98.4 °F (36.9 °C)   SpO2: 92%         CONSTITUTIONAL:    Awake, alert, cooperative, no apparent distress, and appears stated age    EYES:     EOMI, sclera clear without icterus, conjunctiva normal    ENT:    Normocephalic, atraumatic,  External ears without lesions. NECK:    Supple, symmetrical, trachea midline,, no JVD    HEMATOLOGIC/LYMPHATICS:    No cervical lymphadenopathy and no supraclavicular lymphadenopathy    LUNGS:    Symmetric. No increased work of breathing, good air exchange, clear to auscultation bilaterally, no wheezes, rhonchi, or rales,     CARDIOVASCULAR:    Normal apical impulse, regular rate and rhythm, normal S1 and S2, no S3 or S4, and no murmur noted    ABDOMEN:     soft, non-distended, non-tender    MUSCULOSKELETAL:    There is no redness, warmth, or swelling of the joints. NEUROLOGIC:    Awake, alert, oriented to name, place and time. SKIN:    No bruising or bleeding. No redness, warmth, or swelling    EXTREMITIES:    Peripheral pulses present. No edema, cyanosis, or swelling.   Patient has dressing over left lower extremity    LINES/CATHETERS     LABORATORY DATA:  CBC with Differential:    Lab Results   Component Value Date    WBC 6.5 11/29/2021    RBC 4.25 11/29/2021    HGB 11.0 12/02/2021    HCT 33.4 12/02/2021     11/29/2021    MCV 95.3 11/29/2021    MCH 30.8 11/29/2021    MCHC 32.3 11/29/2021    RDW 13.2 11/29/2021    LYMPHOPCT 41.0 11/29/2021    MONOPCT 6.8 11/29/2021    BASOPCT 0.6 11/29/2021    MONOSABS 0.44 11/29/2021    LYMPHSABS 2.67 11/29/2021    EOSABS 0.13 11/29/2021    BASOSABS 0.04 11/29/2021     CMP:    Lab Results   Component Value Date     11/29/2021    K 3.4 11/29/2021     11/29/2021    CO2 28 11/29/2021    BUN 11 11/29/2021    CREATININE 0.8 11/29/2021    GFRAA >60 11/29/2021    LABGLOM >60 11/29/2021    GLUCOSE 91 11/29/2021    PROT 7.5 11/29/2021    LABALBU 4.1 11/29/2021    CALCIUM 9.5 11/29/2021 BILITOT 0.5 11/29/2021    ALKPHOS 67 11/29/2021    AST 24 11/29/2021    ALT 22 11/29/2021       ASSESSMENT/PLAN:  1. Status post left total knee replacement  2. Depression  3. Hypertension    Patient is status post left total knee replacement. Further pain management, activity, diet per orthopedic surgery. Home medications and routine blood work have been ordered per orthopedic surgery.       Homer DavisPeak  4:52 AM  12/2/2021    Electronically signed by Diane Tatum DO on 12/2/21 at 4:52 AM EST

## 2021-12-02 NOTE — PROGRESS NOTES
[address]  [facility] [place]. [place]        Date:2021                                                  Patient Name: [place]    MRN: [id number]    : 1965    Room: [address] [place] [address]      Evaluating OT: Huber Powell OTR/L; [id number]     Referring Provider and Specific Provider Orders/Date:      21  OT eval and treat  Start:  21,   End:  21,   ONE TIME,   Standing Count:  1 Occurrences,   R        Last continued at transfer on Wed Dec 1, 2021  4:23 PM    Torrey Worrell DO      Placement Recommendation: HHOT       Diagnosis:   1.  Primary osteoarthritis of left knee         Surgery: L TKA      Pertinent Medical History:       Past Medical History:   Diagnosis Date    Arthritis     Depression     HTN (hypertension)     Seasonal allergies          Past Surgical History:   Procedure Laterality Date    CHOLECYSTECTOMY      COLONOSCOPY      DILATION AND CURETTAGE OF UTERUS      TONSILLECTOMY      TOTAL KNEE ARTHROPLASTY Right 2021    RIGHT KNEE TOTAL KNEE ARTHROPLASTY [facility] & [facility]) performed by Torrey Worrell DO at [address] OR      Precautions:  Fall Risk, full weight bearing: L LE d/t TKA     Assessment of current deficits    [x] Functional mobility  [x]ADLs  [x] Strength               []Cognition    [x] Functional transfers   [x] IADLs         [] Safety Awareness   [x]Endurance    [] Fine Coordination              [x] Balance      [] Vision/perception   []Sensation     []Gross Motor Coordination  [x] ROM  [] Delirium                   [] Motor Control     OT PLAN OF CARE   OT POC based on physician orders, patient diagnosis and results of clinical assessment    Frequency/Duration 1-3 days/wk for 2 weeks PRN     Specific OT Treatment Interventions to include:   * Instruction/training on adapted ADL techniques and AE recommendations to increase functional independence within precautions       * Training on energy conservation strategies, correct breathing pattern and techniques to improve independence/tolerance for self-care routine  * Functional transfer/mobility training/DME recommendations for increased independence, safety, and fall prevention  * Patient/Family education to increase follow through with safety techniques and functional independence  * Recommendation of environmental modifications for increased safety with functional transfers/mobility and ADLs  * Therapeutic exercise to improve motor endurance, ROM, and functional strength for ADLs/functional transfers  * Therapeutic activities to facilitate/challenge dynamic balance, stand tolerance for increased safety and independence with ADLs  * Positioning to improve skin integrity, interaction with environment and functional independence    Recommended Adaptive Equipment: none     Home Living: with spouse; single family home, 1 story, 3 steps to enter with rail, tub shower. Equipment owned: wheeled walker, cane, wheelchair, bedside commode, tub transfer bench, grab bars    Prior Level of Function: Independent with ADLs , Independent with IADLs; ambulated with no device    Driving: yes    Pain Level: 8/10 pain in L knee; Nursing notified.       Cognition: A&O: 4/4; Follows 3 step directions   Memory: good    Sequencing: good    Problem solving: good    Judgement/safety: good     [facility]   AM-PAC Daily Activity Inpatient   How much help for putting on and taking off regular lower body clothing?: A Little  How much help for Bathing?: A Little  How much help for Toileting?: A Little  How much help for putting on and taking off regular upper body clothing?: A Little  How much help for taking care of personal grooming?: A Little  How much help for eating meals?: None  AM-[facility] Inpatient Daily Activity Raw Score: 19  AM-PAC Inpatient ADL T-Scale Score : 40.22  ADL Inpatient CMS 0-100% Score: 42.8  ADL Inpatient CMS G-Code Modifier : CK     Functional Assessment:    Initial Eval Status  Date: 12/2/21 Treatment Status  Date: STGs = LTGs  Time frame: 10-14 days   Feeding Independent   Independent    Grooming Supervision   Independent    UB Dressing Supervision to [facility] gown and don bra and shirt   Independent    LB Dressing Supervision to don underpants and pants while seated in bedside chair then stood to bring up around hips and waist.   Doffed ace bandage while seated in bedside chair  Independent    Bathing Minimal Assist  Modified Wheelwright    Toileting Supervision   Independent    Bed Mobility  N/T as pt was seated in bedside chair and returned to bedside chair. Supine to sit: Independent   Sit to supine: Independent    Functional Transfers Supervision from bedside chair to wheeled walker. Supervision for transfer to and from low commode with minimal verbal cues to use grab bar for safe commode transfer  Transfer training with verbal cues for hand placement throughout session to improve safety. Independent    Functional Mobility Supervision with wheeled walker to improve balance to and from bathroom, verbal cues for walker sequence and safety. Modified Wheelwright    Balance Sitting:     Static: good     Dynamic: good   Standing: fair plus with wheeled walker     Activity Tolerance fair   good    Visual/  Perceptual Glasses: yes                 Hand Dominance: right      AROM (PROM) Strength Additional Info:    RUE  WFL 5/5 good  and wfl FMC/dexterity noted during ADL tasks     LUE WFL 5/5 good  and wfl FMC/dexterity noted during ADL tasks       Hearing: WFL   Sensation:  No c/o numbness or tingling  Tone: WFL   Edema: yes, surgical extremity     Comments: Upon arrival the patient was seated in bedside chair. At end of session, patient was returned to bedside chair with call light and phone within reach, all lines and tubes intact.   Overall patient demonstrated decreased independence and safety during completion of ADL/functional transfer/mobility tasks. Pt would benefit from continued skilled OT to increase safety and independence with completion of ADL/IADL tasks for functional independence and quality of life. Treatment: OT treatment provided this date includes:    Instruction/training on safety and adapted techniques for completion of ADLs    Instruction/training on safe functional mobility/transfer techniques    Instruction/training on energy conservation/work simplification for completion of ADLs    Proper Positioning/Alignment   Instruction/training in weight bearing status and walker sequence   Instruction/training in lower body dressing techniques     Rehab Potential: Good for established goals. Patient / Family Goal: return home       Patient and/or family were instructed on functional diagnosis, prognosis/goals and OT plan of care. Demonstrated good understanding. Eval Complexity: Low    Time In: 9:05am  Time Out: 9:35am    Total Treatment Time: 10      Min Units   OT Eval Low 97165  X  1    OT Eval Medium 01575      OT Eval High 29056      OT Re-Eval 67554            ADL/Self Care 44760  10  1    Therapeutic Activities 53011       Therapeutic Ex 03646       Orthotic Management 58949       Manual 87839     Neuro Re-Ed 29199       Non-Billable Time        Evaluation Time additionally includes thorough review of current medical information, gathering information on past medical history/social history and prior level of function, interpretation of standardized testing/informal observation of tasks, assessment of data and development of plan of care and goals.         Evaluating OT: Sera Rajan OTR/L; [id number]

## 2021-12-02 NOTE — PROGRESS NOTES
Department of Internal Medicine  Internal Medicine Progress Note    Primary Care Physician: Destinee Ocampo MD   Admitting Physician:  Jennyfer Winters DO  Admission date and time: 12/1/2021  7:51 AM    Room:  [address] [place] [address]  Admitting diagnosis: Primary osteoarthritis of one knee, left [M17.12]      Patient Name: Darell Gasca  MRN: [id number]    Date of Service: 12/1/2021    Reason for consultation: Status post left total knee replacement    History of present illness:    Patient is 44-year-old female who is status post left total knee replacement. Patient pain is controlled. Patient denies any chest pain or shortness of breath. Denies any lightheadedness or dizziness. Patient did urinate postoperatively. She denies any issues with oral intake. 12/2/2021   Patient seen examined on medical surgical floor. Patient has expected postop discomfort. Patient denies any chest pain, abdominal pain, nausea/vomiting, dizziness. Hemoglobin 11.0. Temperature 98.3 with heart rate 88 blood pressure 116/62. O2 sat 97% room air at rest.      PAST MEDICAL Hx:  Past Medical History:   Diagnosis Date    Arthritis     Depression     HTN (hypertension)     Seasonal allergies        PAST SURGICAL Hx:   Past Surgical History:   Procedure Laterality Date    CHOLECYSTECTOMY      COLONOSCOPY      DILATION AND CURETTAGE OF UTERUS      TONSILLECTOMY      TOTAL KNEE ARTHROPLASTY Right 5/26/2021    RIGHT KNEE TOTAL KNEE ARTHROPLASTY [facility] & [facility]) performed by Jennyfre Winters DO at [address] Hx:  Family History   Problem Relation Age of Onset    Diabetes Mother     Hypertension Mother     High Cholesterol Father        HOME MEDICATIONS:  Prior to Admission medications    Medication Sig Start Date End Date Taking?  Authorizing Provider   aspirin 325 MG EC tablet Take 1 tablet by mouth 2 times daily for 28 days 12/1/21 12/29/21 Yes Jorge Brooke DO   celecoxib (CELEBREX) 200 MG capsule Take 1 capsule by mouth 2 times daily 12/1/21  Yes Jorge Brooke DO   gabapentin (NEURONTIN) 100 MG capsule Take 1 capsule by mouth 3 times daily for 30 days. Intended supply: 30 days 12/1/21 12/31/21 Yes Jorge Brooke DO   oxyCODONE-acetaminophen (PERCOCET) 5-325 MG per tablet Take 1 tablet by mouth every 6 hours as needed for Pain for up to 7 days. Intended supply: 7 days.  Take lowest dose possible to manage pain 12/1/21 12/8/21 Yes Jorge Brooke DO   cephALEXin (KEFLEX) 500 MG capsule Take 4 capsules by mouth daily as needed (take 2 g, 1 hour prior to dental procedure) 9/9/21   Jennyfer [place], DO   cetirizine (ZYRTEC) 10 MG tablet Take 10 mg by mouth daily as needed     Historical Provider, MD   Elastic Bandages & Supports (KNEE BRACE ADJUSTABLE HINGED) MISC Wear brace when active 12/3/19   Jennyfer [place], DO   KLOR-CON M20 20 MEQ extended release tablet Take 40 mEq by mouth daily  3/29/18   Historical Provider, MD   atenolol-chlorthalidone (TENORETIC) 50-25 MG per tablet Take 1 tablet by mouth nightly  5/16/18   Historical Provider, MD   citalopram (CELEXA) 40 MG tablet Take 40 mg by mouth nightly     Historical Provider, MD       ALLERGIES:  Pcn [penicillins], Ace inhibitors, and Adhesive tape    SOCIAL Hx:  Social History     Socioeconomic History    Marital status:      Spouse name: Not on file    Number of children: Not on file    Years of education: Not on file    Highest education level: Not on file   Occupational History    Not on file   Tobacco Use    Smoking status: Never Smoker    Smokeless tobacco: Never Used   Vaping Use    Vaping Use: Never used   Substance and Sexual Activity    Alcohol use: No    Drug use: Never    Sexual activity: Not on file   Other Topics Concern    Not on file   Social History Narrative    Not on file     Social Determinants of Health     Financial Resource Strain:     Difficulty of Paying Living Expenses: Not on file   Food Insecurity:     Worried About Running Out of Food in the Last Year: Not on file    Ran Out of Food in the Last Year: Not on file   Transportation Needs:     Lack of Transportation (Medical): Not on file    Lack of Transportation (Non-Medical): Not on file   Physical Activity:     Days of Exercise per Week: Not on file    Minutes of Exercise per Session: Not on file   Stress:     Feeling of Stress : Not on file   Social Connections:     Frequency of Communication with Friends and Family: Not on file    Frequency of Social Gatherings with Friends and Family: Not on file    Attends Bahai Services: Not on file    Active Member of [address] [place] [address] [employer] or Organizations: Not on file    Attends Club or Organization Meetings: Not on file    Marital Status: Not on file   Intimate Partner Violence:     Fear of Current or Ex-Partner: Not on file    Emotionally Abused: Not on file    Physically Abused: Not on file    Sexually Abused: Not on file   Housing Stability:     Unable to Pay for Housing in the Last Year: Not on file    Number of [place] in the Last Year: Not on file    Unstable Housing in the Last Year: Not on file       ROS: Positive in bold  General:   Denies chills, fatigue, fever, malaise, night sweats or weight loss    Psychological:   Denies anxiety, disorientation or hallucinations    ENT:    Denies epistaxis, headaches, vertigo or visual changes    Cardiovascular:   Denies any chest pain, irregular heartbeats, or palpitations. No paroxysmal nocturnal dyspnea. Respiratory:   Denies shortness of breath, coughing, sputum production, hemoptysis, or wheezing. No orthopnea. Gastrointestinal:   Denies nausea, vomiting, diarrhea, or constipation. Denies any abdominal pain. Denies change in bowel habits or stools. Genito-Urinary:    Denies any urgency, frequency, hematuria. Voiding without difficulty.     Musculoskeletal:   Denies joint pain, joint stiffness, joint swelling or muscle pain    Neurology:    Denies any headache or focal neurological deficits. No weakness or paresthesia. Derm:    Denies any rashes, ulcers, or excoriations. Denies bruising. Extremities:   Denies any lower extremity swelling or edema. PHYSICAL EXAM: Abnormal findings noted  VITALS:  Vitals:    12/02/21 0600   BP: (!) 102/51   Pulse: 71   Resp: 16   Temp: 98 °F (36.7 °C)   SpO2: 96%         CONSTITUTIONAL:    Awake, alert, cooperative, no apparent distress, and appears stated age    EYES:     EOMI, sclera clear without icterus, conjunctiva normal    ENT:    Normocephalic, atraumatic,  External ears without lesions. NECK:    Supple, symmetrical, trachea midline,, no JVD    HEMATOLOGIC/LYMPHATICS:    No cervical lymphadenopathy and no supraclavicular lymphadenopathy    LUNGS:    Symmetric. No increased work of breathing, good air exchange, clear to auscultation bilaterally, no wheezes, rhonchi, or rales,     CARDIOVASCULAR:    Normal apical impulse, regular rate and rhythm, normal S1 and S2, no S3 or S4, and no murmur noted    ABDOMEN:     soft, non-distended, non-tender    MUSCULOSKELETAL:    There is no redness, warmth, or swelling of the joints. NEUROLOGIC:    Awake, alert, oriented to name, place and time. SKIN:    No bruising or bleeding. No redness, warmth, or swelling    EXTREMITIES:    Peripheral pulses present. No edema, cyanosis, or swelling.   Patient has dressing over left lower extremity    LINES/CATHETERS     LABORATORY DATA:  CBC with Differential:    Lab Results   Component Value Date    WBC 6.5 11/29/2021    RBC 4.25 11/29/2021    HGB 11.0 12/02/2021    HCT 33.4 12/02/2021     11/29/2021    MCV 95.3 11/29/2021    MCH 30.8 11/29/2021    MCHC 32.3 11/29/2021    RDW 13.2 11/29/2021    LYMPHOPCT 41.0 11/29/2021    MONOPCT 6.8 11/29/2021    BASOPCT 0.6 11/29/2021    MONOSABS 0.44 11/29/2021    LYMPHSABS 2.67 11/29/2021    EOSABS 0.13 11/29/2021    BASOSABS 0.04 11/29/2021     CMP:    Lab Results Component Value Date     11/29/2021    K 3.4 11/29/2021     11/29/2021    CO2 28 11/29/2021    BUN 11 11/29/2021    CREATININE 0.8 11/29/2021    GFRAA >60 11/29/2021    LABGLOM >60 11/29/2021    GLUCOSE 91 11/29/2021    PROT 7.5 11/29/2021    LABALBU 4.1 11/29/2021    CALCIUM 9.5 11/29/2021    BILITOT 0.5 11/29/2021    ALKPHOS 67 11/29/2021    AST 24 11/29/2021    ALT 22 11/29/2021       ASSESSMENT/PLAN:  1. Status post left total knee replacement 12/1/2021  2. Depression  3. Hypertension  4. Obesity    Patient is status post left total knee replacement. Further pain management, activity, diet per orthopedic surgery. Home medications and routine blood work have been ordered per orthopedic surgery.     Discharge home when okay with orthopedics   Patient to continue home medication  Patient follow-up primary care physician in 1 week or as directed    Huan Leong, DO  9:48 AM  12/2/2021

## 2021-12-02 NOTE — PROGRESS NOTES
Physical Therapy /Plan of Care    Room #:  0320/0320-01  Patient Name: Saud Sam  YOB: 1965  MRN: [id number]    Date of Service: 12/2/2021     Tentative placement recommendation: [address] [facility] 5 days a week   Equipment recommendation: Patient has needed equipment       Evaluating Physical Therapist: Kannan Mabry  #[id number]     Specific Provider Orders/Date/Referring Provider :  12/01/21 0915   PT eval and treat Start: 12/01/21 0915, End: 12/01/21 0915, ONE TIME, Standing Count: 1 Occurrences, R    Jacqueline Waters DO      Admitting Diagnosis:   Primary osteoarthritis of one knee, left [M17.12]   Visit diagnosis:   Surgery:     left Total knee arthroplasty (TKA)    Patient Active Problem List   Diagnosis    Primary osteoarthritis of right knee    Primary osteoarthritis of left knee    Primary osteoarthritis of one knee, left       ASSESSMENT of current deficits: Patient exhibits decreased strength, balance, endurance, range of motion and pain left thigh impairing functional mobility, transfers, gait , gait distance and tolerance to activity are barriers to d/c and require skilled intervention during hospital stay to attain pre hospital level of function. Decreased strength, balance and endurance  increases patient's risk for fall. Progressed transfers and gait distance with supervision, cues needed for neutral foot and  Left knee extension in stance phase of gait. No new complaints noted.        PHYSICAL THERAPY  PLAN OF CARE       Physical therapy plan of care is established based on physician order,  patient diagnosis and clinical assessment    Current Treatment Recommendations:    -Standing Balance: Perform strengthening exercises in standing to promote motor control with or without upper extremity support   -Transfers: Provide instruction on proper hand and foot position for adequate transfer of weight onto lower extremities and use of gait device if needed and Cues for hand placement, technique and safety. Provide stabilization to prevent fall   -Gait: Gait training and Standing activities to improve: base of support, weight shift, weight bearing    -Endurance: Utilize Supervised activities to increase level of endurance to allow for safe functional mobility including transfers and gait   -Stairs: Stair training with instruction on proper technique and hand placement on rail    PT long term treatment goals are located in below grid    Patient and or family understand(s) diagnosis, prognosis, and plan of care. Frequency of treatments: Patient will be seen  twice daily  for therapeutic exercise, functional retraining, endurance activities, balance exercises, family and patient education.        Prior Level of Function: Patient ambulated independently    Rehab Potential: good    for baseline    Past medical history:   Past Medical History:   Diagnosis Date    Arthritis     Depression     HTN (hypertension)     Seasonal allergies      Past Surgical History:   Procedure Laterality Date    CHOLECYSTECTOMY      COLONOSCOPY      DILATION AND CURETTAGE OF UTERUS      TONSILLECTOMY      TOTAL KNEE ARTHROPLASTY Right 5/26/2021    RIGHT KNEE TOTAL KNEE ARTHROPLASTY [facility]) performed by Muriel Kohli DO at [address]:    Precautions:  Ambulate patient , falls, alarm and O2 ,Left FWB (full weight bearing)     Social history: Patient lives with spouse in a ranch home  with 3 steps  to enter with Rail  Tub shower grab bars    Equipment owned: Cane, [address] and Bedside commode,       [facility]   How much difficulty turning over in bed?: None  How much difficulty sitting down on / standing up from a chair with arms?: A Little  How much difficulty moving from lying on back to sitting on side of bed?: A Little  How much help from another person moving to and from a bed to a chair?: A Little  How much help from another person needed to walk in hospital room?: A Little  How much help from another person for climbing 3-5 steps with a railing?: A Little  AM-PAC Inpatient Mobility Raw Score : 19  AM-PAC Inpatient T-Scale Score : 45.44  Mobility Inpatient CMS 0-100% Score: 41.77  Mobility Inpatient CMS G-Code Modifier : CK    Nursing cleared patient for PT treatment. OBJECTIVE:   Initial Evaluation  Date: 12/1/2021 Treatment Date:12/2/2021   Short Term/ Long Term   Goals   Was pt agreeable to Eval/treatment? Yes Yes     Pain Level  2/10   Left knee   2/10     Bed Mobility  Rolling: Supervision     Supine to sit: Supervision     Sit to supine: Minimal assist of 1    Scooting: Supervision    Rolling: Supervision    Supine to sit: Supervision    Sit to supine: Supervision    Scooting: Supervision     Rolling: Independent    Supine to sit: Independent    Sit to supine: Independent    Scooting: Independent     Transfers Sit to stand:  Moderate assist of 1 from commode and min a from bed  Sit to stand: Supervision  cues for hand placement   Sit to stand: Modified Independent     Ambulation    2x25 feet using  wheeled walker with Minimal assist of 1   for walker approximation, weight shift, multiplane instability, increased weight bearing Left, Left extension in stance phase of gait and increased Left hip and knee flexion in swing phase of gait, Patient with shuffling steps and cues for sequencing, upright posture, walker approximation, safety and proper hand placement 25 feet and 90  feet using  wheeled walker with Supervision    for walker approximation, upright, safety and Left extension in stance phase of gait and cues for sequencing and increased step length   100 feet using  wheeled walker with Modified Independent    Stair negotiation: ascended and descended   Not assessed    7 steps bilateral rails supervision     3 steps full weight bearing rail and crutch vs cane   ROM Within functional limits except Left knee 0-80- ° Increase range of motion 10% of affected joints    Strength Within functional limits  except  Left lower extremity 3/5  Within functional limits   Balance Sitting EOB:  good     Dynamic Standing:  fair    wheeled walker  Sitting EOB: good   Dynamic Standing: good- wheeled walker    Sitting EOB:  good    Dynamic Standing:  good wheeled walker      Patient is Alert & Oriented x person, place, time and situation and follows directions    Sensation:  Patient reports numbness/tingling and perineum      Edema:  yes left lower extremity        Patient education  Patient educated on role of Physical Therapy, risks of immobility, safety and plan of care,  importance of mobility while in hospital , ankle pumps, quad set and glut set for edema control, blood clot prevention, importance and purpose of adaptive device and adjusted to proper height for the patient. , safety , weight bearing status  and left knee extension in bed and during stance phase of gait      Patient response to education:   Pt verbalized understanding Pt demonstrated skill Pt requires further education in this area   Yes Partial Yes       Treatment:  Patient practiced and was instructed in the following treatment:     Therapist educated and facilitated patient on techniques to increase safety and independence with bed mobility, balance, functional transfers, and functional mobility. Instruction knee extension in bed with kneecap and toes pointing to ceiling,   Gait and transfer training. Patient performed supine ankle pumps, quad sets, glut sets x 8 each. Active assist heelslide, hip abduction/adduction, straight leg raise x8 each. Sat edge of bed 10 minutes with Supervision  to increase dynamic sitting balance and activity tolerance. Stood ambulated into hallway, educated and performed steps. Ambulated further into hallway and back into room. Patient seated in chair.     At end of session, patient in chair with    left with social work call light and phone within reach,   all lines and tubes intact, nursing notified. Patient would benefit from continued skilled Physical Therapy to improve functional independence and quality of life. Patient's/ family goals   home      Patient and or family understand(s) diagnosis, prognosis, and plan of care. Frequency of treatments: Patient will be seen  twice daily  for therapeutic exercise, functional retraining, endurance activities, balance exercises, family and patient education.      Time in  837  Time out  902    Total Treatment Time  25 minutes    CPT codes:    Therapeutic activities (58537)   16 minutes  1 unit(s)  Therapeutic exercises (69907)   9 minutes  1 unit(s)    Augusto Hess, PTA  RZI#[id number]

## 2021-12-02 NOTE — PROGRESS NOTES
CLINICAL PHARMACY NOTE: MEDS TO BEDS    Total # of Prescriptions Filled: 3   The following medications were delivered to the patient:  · Percocet 5-325mg  · Celecoxib 200mg  · Gabapentin 100mg    Additional Documentation:

## 2021-12-02 NOTE — PROGRESS NOTES
OT BEDSIDE TREATMENT NOTE      Date:2021  Patient Name: Fela Chamberlain  MRN: [id number]  : 1965  Room: [address] [place] [address]         Evaluating OT: Stan Krishnamurthy OTR/L; [id number]      Referring Provider and Specific Provider Orders/Date:      21   OT eval and treat  Start:  21,   End:  21,   ONE TIME,   Standing Count:  1 Occurrences,   R        Last continued at transfer on Wed Dec 1, 2021  4:23 PM    Noy Parks DO       Placement Recommendation: HHOT        Diagnosis:   1. Primary osteoarthritis of left knee         Surgery: L TKA       Pertinent Medical History:       Past Medical History        Past Medical History:   Diagnosis Date    Arthritis      Depression      HTN (hypertension)      Seasonal allergies              Past Surgical History   Past Surgical History:   Procedure Laterality Date    CHOLECYSTECTOMY        COLONOSCOPY        DILATION AND CURETTAGE OF UTERUS        TONSILLECTOMY        TOTAL KNEE ARTHROPLASTY Right 2021     RIGHT KNEE TOTAL KNEE ARTHROPLASTY [facility]) performed by Noy Parks DO at [address]          Precautions:  Fall Risk, full weight bearing: L LE d/t TKA      Assessment of current deficits    [x]? Functional mobility            [x]?ADLs           [x]? Strength                   []?Cognition    [x]? Functional transfers          [x]? IADLs         []? Safety Awareness   [x]? Endurance    []? Fine Coordination              [x]? Balance      []? Vision/perception    []? Sensation      []? Gross Motor Coordination  [x]? ROM           []?  Delirium                   []? Motor Control      OT PLAN OF CARE   OT POC based on physician orders, patient diagnosis and results of clinical assessment     Frequency/Duration 1-3 days/wk for 2 weeks PRN      Specific OT Treatment Interventions to include:   * Instruction/training on adapted ADL techniques and AE recommendations to increase functional independence within precautions Treatment Status  Date:12/2/2021 STGs = LTGs  Time frame: 10-14 days   Feeding Independent   pt able to bring cup to mouth independently Independent    Grooming Supervision  Supervision when standing sinkside in clinic to simulate grooming tasks; cuing for sequencing when stepping backward from sink    Independent    UB Dressing Supervision to [facility] gown and don bra and shirt  N/T -Pt had donned shirt prior to session  Independent    LB Dressing Supervision to don underpants and pants while seated in bedside chair then stood to bring up around hips and waist.   Doffed ace bandage while seated in bedside chair Pt donned pants with assist prior to session; mod/max A to don LINO hose with use of easy slide; good understanding noted  Independent    Bathing Minimal Assist Min A; Pt education, instruction demonstration and participation with use of DME in clinic for bathroom safety/safe transfers. Mod I with pt able to support LLE   to/from simulated tub/shower. Modified [place]    Toileting Supervision  Transfer to/from [facility] over toilet in clinic with use of FWW; cuing for hand placement and joint protection  Independent    Bed Mobility  N/T as pt was seated in bedside chair and returned to bedside chair. Min A; pt seated in chair on arrival; min A with cuing and assist to support LLE to supine; assist for positioning, to don SCD's and ice  Supine to sit: Independent   Sit to supine: Independent    Functional Transfers Supervision from bedside chair to wheeled walker.    Supervision for transfer to and from low commode with minimal verbal cues to use grab bar for safe commode transfer  Transfer training with verbal cues for hand placement throughout session to improve safety.   Supervision for sit to stand transfer from chair, to/from w/c and multiple surfaces in clinic; cuing for sequencing, upright position, walker safety, hand placement; transfer to/from simulated car with pt able to support LLE with R LE; transfer from w/c to bed with supervision with [facility] [address] with wheeled walker to improve balance to and from bathroom, verbal cues for walker sequence and safety.   Supervision with FWW for household distances in room and clinic; no LOB noted; cuing for sequencing,upright position Modified [place]    Balance Sitting:     Static: good     Dynamic: good   Standing: fair plus with wheeled walker Sitting:     Static: good     Dynamic: good   Standing: fair plus with wheeled walker      Activity Tolerance fair  fair  good    Visual/  Perceptual Glasses: yes                        Hand Dominance: right      Hearing: WFL   Sensation:  No c/o numbness or tingling  Tone: WFL   Edema: yes, LLE; LINO hose donned; positioning and ice provided; SCD's donned     Comments: Patient cleared by nursing staff. Upon arrival pt seated in bedside chair. Pt agreeable to OT tx session. Pt educated with regards to bed mobility, hand placement, safety awareness, static sitting balance,  standing balance, transfer training, functional mobility, simulated grooming tasks, bathroom safety/DME, LE dressing with AA to don LINO hose, toilet, tub/shower and car transfers, ECT's. At end of session pt supine in bed with HOB slightly elevated  with all lines and tubes intact, call light within reach. Overall, pt demonstrated fair independence and safety during completion of ADL/functional transfers/mobility tasks. Pt would benefit from continued skilled OT to increase safety and independence with completion of ADL/IADL tasks for functional independence and quality of life. Pt required cues and education as noted above for safe facilitation and completion of tasks. Therapist provided skilled monitoring of patient's response during treatment session. Prior to and at the end of session, environmental modifications completed for patients safety and efficiency of treatment session.      Overall, patient demonstrates minimal difficulties with completion of BADLs and IADLs. Factors contributing to these difficulties include  decreased endurance, and generalized weakness. As noted above, patient likely to benefit from further OT intervention to increase independence, safety, and overall quality of life. Treatment:     ? Bed mobility: Facilitated bed mobility with cues for proper body mechanics and sequencing to prepare for ADL completion. ? Functional transfers: Facilitated transfers from various surfaces with cues for body alignment, safety and hand placement. ? ADL completion: Self-care retraining for the above-mentioned ADLs; training on proper hand placement, safety technique, sequencing, and energy conservation techniques. ? Postural Balance: Sitting/standing balance retraining to improve righting reactions with postural changes during ADLs. ? Skilled positioning: Proper positioning to improve interaction with environment, overall functioning and decrease edema     · Pt has made fair progress towards set goals    ·  D/C with HHOT PRN with family supervision/assist     Treatment Time also includes thorough review of current medical information, gathering information on past medical history/social history and prior level of function, informal observation of tasks, assessment of data and education on plan of care and goals.     Treatment Time In: 10:47 AM     Treatment Time Out: 11:21 AM            Treatment Charges: Mins Units   ADL/Home Mgt     96875 4 0   Thera Activities     18976 30 2   Ther Ex                 [id number]     Manual Therapy    92535     Neuro Re-ed         23997     Orthotic manage/training                               68945     Non Billable Time     Total Timed Treatment [address] [facility], WEBB/L #[id number]

## 2021-12-02 NOTE — ANESTHESIA POSTPROCEDURE EVALUATION
Department of Anesthesiology  Postprocedure Note    Patient: Liz Tucker  MRN: [id number]  YOB: 1965  Date of evaluation: 12/2/2021  Time:  7:02 AM     Procedure Summary     Date: 12/01/21 Room / Location: [address] [place] [address] / [address] [place] [address]    Anesthesia Start: 9134 Anesthesia Stop: 2786    Procedure: LEFT TOTAL KNEE  ARTHROPLASTY([facility] ) (Left Knee) Diagnosis: (LEFT KNEE TKA)    Surgeons: Eloy Boeck, DO Responsible Provider: Abiola Bryant MD    Anesthesia Type: spinal ASA Status: 3          Anesthesia Type: spinal    Reena Phase I: Reena Score: 8    Reena Phase II:      Last vitals: Reviewed and per EMR flowsheets.        Anesthesia Post Evaluation

## 2021-12-02 NOTE — CARE COORDINATION
12/02/2021: SS Note/Discharge plan:  SS Consult and HHC order noted, sw met with pt for post-op d/c planning, pt plans to return home later today and prefers [facility], pt had their agency in the past after her other knee surgery, pt has dme needed, referral made to [facility] for [facility], agency accepted referral for start of care tomorrow, nursing informed.  Electronically signed by Dorsie Goldberg, LSW on 12/2/2021 a

## 2021-12-02 NOTE — PROGRESS NOTES
Department of Orthopedic Surgery  Resident Progress Note    Patient seen and examined. Pain controlled. Very motivated. No new complaints. Denies chest pain, shortness of breath, dizziness/lightheadedness. - Flatulence, - BM     VITALS:  BP (!) 102/51   Pulse 71   Temp 98 °F (36.7 °C) (Oral)   Resp 16   Ht 5' 4\" (1.626 m)   SpO2 96%   BMI 41.37 kg/m²     General: alert and oriented to person, place and time, well-developed and well-nourished, in no acute distress    MUSCULOSKELETAL:   left lower extremity:  · Dressing C/D/I  · Compartments soft and compressible  · +PF/DF/EHL  · +2/4 DP & PT pulses, Brisk Cap refill, Toes warm and perfused  · Distal sensation grossly intact to Peroneals, Sural, Saphenous, and tibial nrs    CBC:   Lab Results   Component Value Date    WBC 6.5 11/29/2021    HGB 11.0 12/02/2021    HCT 33.4 12/02/2021     11/29/2021     PT/INR:    Lab Results   Component Value Date    PROTIME 11.9 11/29/2021    INR 1.0 11/29/2021       ASSESSMENT  · S/P L TKA - 12/1/21    PLAN      · Continue physical therapy and protocol: WBAT - LLE  · 24 hour abx coverage  · Deep venous thrombosis prophylaxis - Aspirin 325 BID, early mobilization  · Doing very well POD #1  · PT/OT  · Pain Control: IV and PO  · Monitor H&H- >11 no s/s of anemia this AM.  · D/C Plan:  Home Health pending PT/OT, Medical stability and passing gas. Okay to DC from ortho perspective.

## 2021-12-10 DIAGNOSIS — Z96.652 STATUS POST LEFT KNEE REPLACEMENT: ICD-10-CM

## 2021-12-10 DIAGNOSIS — M17.12 PRIMARY OSTEOARTHRITIS OF LEFT KNEE: Primary | ICD-10-CM

## 2021-12-14 ENCOUNTER — OFFICE VISIT (OUTPATIENT)
Dept: ORTHOPEDIC SURGERY | Age: 56
End: 2021-12-14

## 2021-12-14 VITALS — HEIGHT: 64 IN | WEIGHT: 241 LBS | TEMPERATURE: 98 F | BODY MASS INDEX: 41.15 KG/M2

## 2021-12-14 DIAGNOSIS — Z96.652 S/P TOTAL KNEE ARTHROPLASTY, LEFT: Primary | ICD-10-CM

## 2021-12-14 PROCEDURE — 99024 POSTOP FOLLOW-UP VISIT: CPT | Performed by: ORTHOPAEDIC SURGERY

## 2021-12-14 RX ORDER — HYDROCODONE BITARTRATE AND ACETAMINOPHEN 5; 325 MG/1; MG/1
1 TABLET ORAL EVERY 6 HOURS PRN
Qty: 28 TABLET | Refills: 0 | Status: SHIPPED | OUTPATIENT
Start: 2021-12-14 | End: 2021-12-21

## 2021-12-14 NOTE — PROGRESS NOTES
Subjective:     Post-Operative week: 2 Status Post left Total Knee Arthroplasty, surgery date 12/1/21. Systemic or Specific Complaints:none    Objective:     General: alert, appears stated age and cooperative   Wound: Wound clean and dry no evidence of infection. , No Erythema, No Edema and No Drainage   Motion: Flexion: 0 to 100 Degrees   DVT Exam: No evidence of DVT seen on physical exam.  Negative Radha's sign. No cords or calf tenderness. No significant calf/ankle edema. Imaging:  Xrays:  No signs of fracture, there is good alignment, and no signs of aseptic loosening. Radiographic findings reviewed with patient    Assessment:     Encounter Diagnosis   Name Primary?  S/P total knee arthroplasty, left Yes      Doing well postoperatively. Plan:     Continues current post-op course, staples were removed at today's appointment  Patient is to continue taking enteric coated aspirin 81 mg, 1 tablet twice daily. Patient is to continue wearing LINO hose, on during the day and off at night. Outpatient physical therapy is to begin immediately. No bathing/swimming/hot tub for two weeks or until incision is completely closed. We will see the patient back in 3 weeks for repeat xray and evaluation.   Please call office with any questions or concerns at [phone]

## 2021-12-28 ASSESSMENT — KOOS JR
TWISING OR PIVOTING ON KNEE: 2
STRAIGHTENING KNEE FULLY: 1
RISING FROM SITTING: 1
HOW SEVERE IS YOUR KNEE STIFFNESS AFTER FIRST WAKING IN MORNING: 2
STANDING UPRIGHT: 1
GOING UP OR DOWN STAIRS: 2
BENDING TO THE FLOOR TO PICK UP OBJECT: 1

## 2021-12-28 ASSESSMENT — PROMIS GLOBAL HEALTH SCALE
IN GENERAL, HOW WOULD YOU RATE YOUR PHYSICAL HEALTH [ON A SCALE OF 1 (POOR) TO 5 (EXCELLENT)]?: 4
IN GENERAL, HOW WOULD YOU RATE YOUR SATISFACTION WITH YOUR SOCIAL ACTIVITIES AND RELATIONSHIPS [ON A SCALE OF 1 (POOR) TO 5 (EXCELLENT)]?: 5
SUM OF RESPONSES TO QUESTIONS 3, 6, 7, & 8: 13
IN GENERAL, PLEASE RATE HOW WELL YOU CARRY OUT YOUR USUAL SOCIAL ACTIVITIES (INCLUDES ACTIVITIES AT HOME, AT WORK, AND IN YOUR COMMUNITY, AND RESPONSIBILITIES AS A PARENT, CHILD, SPOUSE, EMPLOYEE, FRIEND, ETC) [ON A SCALE OF 1 (POOR) TO 5 (EXCELLENT)]?: 4
IN THE PAST 7 DAYS, HOW WOULD YOU RATE YOUR PAIN ON AVERAGE [ON A SCALE FROM 0 (NO PAIN) TO 10 (WORST IMAGINABLE PAIN)]?: 2
SUM OF RESPONSES TO QUESTIONS 2, 4, 5, & 10: 15
IN GENERAL, HOW WOULD YOU RATE YOUR MENTAL HEALTH, INCLUDING YOUR MOOD AND YOUR ABILITY TO THINK [ON A SCALE OF 1 (POOR) TO 5 (EXCELLENT)]?: 4
IN GENERAL, WOULD YOU SAY YOUR HEALTH IS...[ON A SCALE OF 1 (POOR) TO 5 (EXCELLENT)]: 4
IN GENERAL, WOULD YOU SAY YOUR QUALITY OF LIFE IS...[ON A SCALE OF 1 (POOR) TO 5 (EXCELLENT)]: 4
IN THE PAST 7 DAYS, HOW OFTEN HAVE YOU BEEN BOTHERED BY EMOTIONAL PROBLEMS, SUCH AS FEELING ANXIOUS, DEPRESSED, OR IRRITABLE [ON A SCALE FROM 1 (NEVER) TO 5 (ALWAYS)]?: 2
HOW IS THE PROMIS V1.1 BEING ADMINISTERED?: 2
IN THE PAST 7 DAYS, HOW WOULD YOU RATE YOUR FATIGUE ON AVERAGE [ON A SCALE FROM 1 (NONE) TO 5 (VERY SEVERE)]?: 4
WHO IS THE PERSON COMPLETING THE PROMIS V1.1 SURVEY?: 0
TO WHAT EXTENT ARE YOU ABLE TO CARRY OUT YOUR EVERYDAY PHYSICAL ACTIVITIES SUCH AS WALKING, CLIMBING STAIRS, CARRYING GROCERIES, OR MOVING A CHAIR [ON A SCALE OF 1 (NOT AT ALL) TO 5 (COMPLETELY)]?: 3

## 2022-01-07 ENCOUNTER — OFFICE VISIT (OUTPATIENT)
Dept: ORTHOPEDIC SURGERY | Age: 57
End: 2022-01-07

## 2022-01-07 VITALS — HEIGHT: 64 IN | BODY MASS INDEX: 41.15 KG/M2 | WEIGHT: 241 LBS

## 2022-01-07 DIAGNOSIS — Z96.652 S/P TOTAL KNEE ARTHROPLASTY, LEFT: Primary | ICD-10-CM

## 2022-01-07 PROCEDURE — 99024 POSTOP FOLLOW-UP VISIT: CPT | Performed by: NURSE PRACTITIONER

## 2022-01-07 RX ORDER — ATENOLOL 50 MG/1
50 TABLET ORAL NIGHTLY
COMMUNITY

## 2022-01-07 NOTE — PROGRESS NOTES
Post-Operative week: 6 Status Post left Total Knee Arthroplasty, DOS: 12/1/2021  Systemic or Specific Complaints:No Complaints    Objective:     General: alert, appears stated age and cooperative   Wound: Wound clean and dry no evidence of infection. , No Erythema, No Edema and No Drainage   Motion: Flexion: 0 to 115 Degrees   DVT Exam: No evidence of DVT seen on physical exam.  Negative Radha's sign. No cords or calf tenderness. No significant calf/ankle edema. Xrays:  N/a    Assessment:     Encounter Diagnosis   Name Primary?  S/P total knee arthroplasty, left Yes      Doing well postoperatively. Plan:     Continues current post-op course  Patient is to discontinue taking enteric coated aspirin 325mg. Patient is to discontinue wearing LINO hose. Continue with outpatient physical therapy.     Follow up 2 months with xr

## 2022-01-20 ENCOUNTER — OFFICE VISIT (OUTPATIENT)
Dept: ORTHOPEDIC SURGERY | Age: 57
End: 2022-01-20
Payer: COMMERCIAL

## 2022-01-20 VITALS — BODY MASS INDEX: 41.15 KG/M2 | TEMPERATURE: 98 F | HEIGHT: 64 IN | WEIGHT: 241 LBS

## 2022-01-20 DIAGNOSIS — G56.02 LEFT CARPAL TUNNEL SYNDROME: Primary | ICD-10-CM

## 2022-01-20 PROCEDURE — 99213 OFFICE O/P EST LOW 20 MIN: CPT | Performed by: ORTHOPAEDIC SURGERY

## 2022-01-20 NOTE — PROGRESS NOTES
Chief Complaint   Patient presents with    Hand Pain     Left hand CTS since September. No treatment for diagnose. Has numbness when doing activities and worse at night. Mariaelena Park is a 49y.o. year old  female who presents for evaluation of left wrist pain. she reports this started 4 months. she does not remember a specific injury that started the pain. The injury was none. The pain/numbness is located mainly palm and radial 3 digits. The pain is worse with activity and better with rest.  The patient has tried splints, otc meds. The treatment has not been effective. The patient is right dominant. The patient is employed at [employer].     Past Medical History:   Diagnosis Date    Arthritis     Depression     HTN (hypertension)     Seasonal allergies      Past Surgical History:   Procedure Laterality Date    CHOLECYSTECTOMY      COLONOSCOPY      DILATION AND CURETTAGE OF UTERUS      TONSILLECTOMY      TOTAL KNEE ARTHROPLASTY Right 5/26/2021    RIGHT KNEE TOTAL KNEE ARTHROPLASTY [facility] & NEPHEW) performed by Sejal Calvert DO at [address] [facility] Left 12/1/2021    LEFT TOTAL KNEE  ARTHROPLASTY([facility] & NEPHEW ) performed by Sejal Calvert DO at [address]       Current Outpatient Medications:     atenolol (TENORMIN) 50 MG tablet, atenolol 50 mg tablet  Take 1 tablet every day by oral route., Disp: , Rfl:     cephALEXin (KEFLEX) 500 MG capsule, Take 4 capsules by mouth daily as needed (take 2 g, 1 hour prior to dental procedure) (Patient not taking: Reported on 1/7/2022), Disp: 4 capsule, Rfl: 0    cetirizine (ZYRTEC) 10 MG tablet, Take 10 mg by mouth daily as needed , Disp: , Rfl:     Elastic Bandages & Supports (KNEE BRACE ADJUSTABLE HINGED) MISC, Wear brace when active (Patient not taking: Reported on 1/7/2022), Disp: 1 each, Rfl: 0    KLOR-CON M20 20 MEQ extended release tablet, Take 40 mEq by mouth daily , Disp: , Rfl:     citalopram (CELEXA) 40 MG tablet, Take 40 mg by mouth nightly , Disp: , Rfl:   Allergies   Allergen Reactions    Pcn [Penicillins] Hives     Unsure  As a child    Ace Inhibitors Other (See Comments)     unknown    Adhesive Tape Dermatitis     Social History     Socioeconomic History    Marital status:      Spouse name: Not on file    Number of children: Not on file    Years of education: Not on file    Highest education level: Not on file   Occupational History    Not on file   Tobacco Use    Smoking status: Never Smoker    Smokeless tobacco: Never Used   Vaping Use    Vaping Use: Never used   Substance and Sexual Activity    Alcohol use: No    Drug use: Never    Sexual activity: Not on file   Other Topics Concern    Not on file   Social History Narrative    Not on file     Social Determinants of Health     Financial Resource Strain:     Difficulty of Paying Living Expenses: Not on file   Food Insecurity:     Worried About Running Out of Food in the Last Year: Not on file    Marilee of Food in the Last Year: Not on file   Transportation Needs:     Lack of Transportation (Medical): Not on file    Lack of Transportation (Non-Medical):  Not on file   Physical Activity:     Days of Exercise per Week: Not on file    Minutes of Exercise per Session: Not on file   Stress:     Feeling of Stress : Not on file   Social Connections:     Frequency of Communication with Friends and Family: Not on file    Frequency of Social Gatherings with Friends and Family: Not on file    Attends Adventism Services: Not on file    Active Member of Clubs or Organizations: Not on file    Attends Club or Organization Meetings: Not on file    Marital Status: Not on file   Intimate Partner Violence:     Fear of Current or Ex-Partner: Not on file    Emotionally Abused: Not on file    Physically Abused: Not on file    Sexually Abused: Not on file   Housing Stability:     Unable to Pay for Housing in the Last Year: Not on file    Number of Places Lived in the Last Year: Not on file    Unstable Housing in the Last Year: Not on file     Family History   Problem Relation Age of Onset    Diabetes Mother     Hypertension Mother     High Cholesterol Father        REVIEW OF SYSTEMS:     General/Constitution:  (-)weight loss, (-)fever, (-)chills, (-)weakness. Skin: (-) rash,(-) psoriasis,(-) eczema, (-)skin cancer. Musculoskeletal: (-) fractures,  (-) dislocations,(-) collagen vascular disease, (-) fibromyalgia, (-) multiple sclerosis, (-) muscular dystrophy, (-) RSD,(-) joint pain (-)swelling, (-) joint pain,swelling. Neurologic: (-) epilepsy, (-)seizures,(-) brain tumor,(-) TIA, (-)stroke, (-)headaches, (-)Parkinson disease,(-) memory loss, (-) LOC. Cardiovascular: (-) Chest pain, (-) swelling in legs/feet, (-) SOB, (-) cramping in legs/feet with walking. Respiratory: (-) SOB, (-) Coughing, (-) night sweats. GI: (-) nausea, (-) vomiting, (-) diarrhea, (-) blood in stool, (-) gastric ulcer. Psychiatric: (-) Depression, (-) Anxiety, (-) bipolar disease, (-) Alzheimer's Disease  Allergic/Immunologic: (-) allergies latex, (-) allergies metal, (-) skin sensitivity. Hematlogic: (-) anemia, (-) blood transfusion, (-) DVT/PE, (-) Clotting disorders      Subjective:    Constitution:  Temp 98 °F (36.7 °C)   Ht 5' 4\" (1.626 m)   Wt 241 lb (109.3 kg)   BMI 41.37 kg/m²     Psycihatric:  The patient is alert and oriented x 3, appears to be stated age and in no distress. Respiratory:  Respiratory effort is not labored. Patient is not gasping. Palpation of the chest reveals no tactile fremitus. Skin:  Upon inspection: the skin appears warm, dry and intact. There is not a previous scar over the affected area. There is not any cellulitis, lymphedema or cutaneous lesions noted in the lower extremities. Upon palpation there is no induration noted. Neurologic:  Motor exam of the upper extremities show:  The reflexes in biceps/triceps/brachioradialis are equal and symmetric. Sensory exam C5-T1 are normal bilaterally. Cardiovascular: The vascular exam is normal and is well perfused to distal extremities. There are 2+ radial pulses bilaterally, and motor and sensation is intact to median, ulnar, and radial, musclocutaneus, and axillary nerve distribution and grossly symmetric bilaterally. There is cap refill noted less than two seconds in all digits. There is not edema of the bilateral upper extremities. There is not varicosities noted in the distal extremities. Lymph:  Upon palpation,  there is no lymphadenopathy noted in bilateral upper extremities. Musculoskeletal:  Gait: normal; examination of the nails and digits reveal no cyanosis or clubbing. Cervical Exam:  On physical exam, Phyllis Poole is well-developed, well-nourished, oriented to person, place and time. her gait is normal.  On evaluation of her cervical spine, she has full range of motion of the cervical spine without pain. There is no cervical tenderness to palpation. Shoulder Exam:  On evaluation of her bilaterally upper extremities, her bilateral shoulder has no deformity. There is not evidence of scapular dyskinesis. There is not muscle atrophy in shoulder girdle. The range of motion for the Right Shoulder is 160/45/T8 and for the Left shoulder is 160/45/T8. Right shoulder Motor strength is 5/5 in the supraspinatus, 5/5 internal rotation and 5/5 in external rotation, and Left shoulder motor strength 5/5 in supraspinatus, 5/5 in internal rotation, 5/5 in external rotation. Elbow exam:  Evaluation of the elbow, reveals no signs of swelling or deformity. ROM is 0-150. There is not instability with varus/valgus stresses. Motor strength is 5/5 with flexion/extension.      Wrist exam:  Inspection of the bilateral upper extremities, there is no evidence of deformity of the wrist.  ROM Wrist ROM R wrist DF 70, VF 80, L wrist DF 60, VF 70, R pronation 90/ supination 90, L pronation 80/supination 80. Motor strength is 5/5 with Dorsiflexion/Volarflexion/Supination/Pronation. Motor and sensation is intact and symmetric throughout the bilateral upper extremities in theulnar and radial , musclcutaneous, and axillary nerve distributions. There are paresthesia in the median nerve distribution of the left wrist.    Hand exam:  The skin overlying the hand is not intact. There is not evidence of scar, lesion, laceration, or abrasion. The motion in the small joints of the hand are intact with no stiffness or deformity. The ROM in the MCP flexion WNL/ extension NWL , PIP flexion WNL/ extension WNL, DIP flexion WNL/ extension WNL. There is not rotational deformity. There is no masses or adenopathy in bilateral upper extremities. Radial pulses are 2+ and symmetric bilaterally. Capillary refill is intact and < 2 seconds. Motor strength is 5/5 with flexion and extension of the small finger joints. Right:  Phallens sign(-), Tinnells sign (-), Median nerve compression test (-),  Finklesteins (-), [employer] Grind test (-), [employer](-). Left:    Phallens sign(-), Tinnells sign (-), Median nerve compression test (-),  Finklesteins (-), [employer] Grind test (-), [employer](-). Xrays:   Not performed today. Radiographic findings reviewed with patient    Impression:   Encounter Diagnosis   Name Primary?  Left carpal tunnel syndrome Yes       Plan: Natural history and expected course discussed. Questions answered. Educational materials distributed. I disucssed the treatment options with the patient. I will order an EMG of her left upper extremity and see her back with the results. Lissette Blas

## 2022-02-08 ENCOUNTER — OFFICE VISIT (OUTPATIENT)
Dept: PHYSICAL MEDICINE AND REHAB | Age: 57
End: 2022-02-08
Payer: COMMERCIAL

## 2022-02-08 VITALS — BODY MASS INDEX: 40.97 KG/M2 | WEIGHT: 240 LBS | HEIGHT: 64 IN

## 2022-02-08 DIAGNOSIS — G56.02 CARPAL TUNNEL SYNDROME OF LEFT WRIST: Primary | ICD-10-CM

## 2022-02-08 PROCEDURE — 99203 OFFICE O/P NEW LOW 30 MIN: CPT | Performed by: PHYSICAL MEDICINE & REHABILITATION

## 2022-02-08 PROCEDURE — 95886 MUSC TEST DONE W/N TEST COMP: CPT | Performed by: PHYSICAL MEDICINE & REHABILITATION

## 2022-02-08 PROCEDURE — 95909 NRV CNDJ TST 5-6 STUDIES: CPT | Performed by: PHYSICAL MEDICINE & REHABILITATION

## 2022-02-08 NOTE — PATIENT INSTRUCTIONS
Electrodiagnotic Laboratory  Accredited by the [facility] with Exemplary status  PHOEBE Jaime D.O. [facility]  [address]  Phone: [phone]  Fax: [phone]        Today you had an electrodiagnostic exam which included nerve conduction studies (NCS) and electromyography (EMG). This test evaluated the electrical activity of your nerves and muscles to help determine if you have a nerve or muscle disease. This test can help determine the location and type of a nerve or muscle problem. This will help your referring doctor diagnose your condition and determine the appropriate next step in your treatment plan. After your test:    1. There are no long lasting side effects of the test.     2. You may resume your normal activities without restrictions. 3.  Resume any medications that were stopped for the test.     4  If you have sore areas or bruising in your muscles where the needle was placed, apply a cold pack to the sore area for 15-20 minutes three to four times a day as needed for pain. The soreness should go away in about 1-2 days. 5. Your results were provided  Briefly at the end of your test and the final detailed report will be provided to your referring physician, and/or primary care physician and any other parties you requested within 1-2 days of the examination. You may wish to contact your referring provider after a few days to determine what they would like you to do next. 6.  Please call [phone] with any questions or concerns and if you develop increased body temperature/fever, swelling, tenderness, increased pain and/or drainage from the sites where the needle was placed. Thank you for choosing us for your health care needs.

## 2022-02-08 NOTE — PROGRESS NOTES
[address] [facility]  Electrodiagnostic Laboratory  *Accredited by the [address] [place] [address] with exemplary status  [address] [facility] [address]  Phone: [phone]  Fax: [phone]    Referring Provider: Micky Jackman DO  Primary Care Physician: Sonam Steiner MD  Patient Name: Diya Marti  Patient YOB: 1965  Gender: female  BMI: Body mass index is 41.2 kg/m². Height 5' 4\" (1.626 m), weight 240 lb (108.9 kg), not currently breastfeeding. 2/8/2022    Description of clinical problem:   Chief Complaint   Patient presents with    Extremity Pain     pain in the thumb and wrist on the left side. dull achy. comes and goes. Since Oct. 2021    Numbness     numbness in the first three fingers. worse in the morning.  Extremity Weakness     slight decrease in strength. Sensory NCS      Nerve / Sites Rec. Site Peak Lat PP Amp Segments Distance Velocity Temp. ms µV  cm m/s °C   L Median - Digit II (Antidromic)      Palm Dig II 2.19 36.9 Palm - Dig II 7 48 32      Wrist Dig II 5.21* 20.6 Wrist - Dig II 14 40 32   L Ulnar - Digit V (Antidromic)      Wrist Dig V 3.75 18.8 Wrist - Dig V 14 46 32   L Radial - Anatomical snuff box (Forearm)      Forearm Wrist 2.40 15.0 Forearm - Wrist 10 55 32       Motor NCS      Nerve / Sites Muscle Onset Amplitude Segments Distance Velocity Temp.     ms mV  cm m/s °C   L Median - APB      Palm APB 2.03 12.2 Palm - APB   32      Wrist APB 5.05* 7.5 Wrist - Palm 8 26* 32      Elbow APB 9.11 7.3 Elbow - Wrist 17 42 32   L Ulnar - ADM      Wrist ADM 3.18 13.1 Wrist - ADM 8  32      B. Elbow ADM 6.51 11.2 B. Elbow - Wrist 18 54 32      A. Elbow ADM 8.33 10.0 A. Elbow - B. Elbow 10 55 32       F Wave      Nerve Fmin % F    ms %   L Median - APB 29.38 50   L Ulnar - ADM 26.15 100       EMG      EMG Summary Table     Spontaneous MUAP Recruitment   Muscle Nerve Roots IA Fib PSW Fasc Amp Dur. PPP Pattern   L.  Biceps brachii Musculocutaneous C5-C6 N None None None N N N N   L. Triceps brachii Radial C6-C8 N None None None N N N N   L. Pronator teres Median C6-C7 N None None None N N N N   L. First dorsal interosseous Ulnar C8-T1 N None None None N N N N   L. Abductor pollicis brevis Median W8-M0 N None None None N N N N          Study Limitations:  none    Summary of Findings:   Nerve conduction studies:   · The following nerve conduction studies were abnormal:   · Left median sensory latency at the wrist is prolonged. · Left median motor latency at the wrist is prolonged and conduction velocity across the wrist is focally slow. · All other nerve conduction studies listed in the table above were normal in latency, amplitude and conduction velocity. Needle EMG:   · Needle EMG was performed using a concentric needle.  All muscles tested, as listed in the table above demonstrated normal amplitude, duration, phases and recruitment and no active denervation signs were seen. Diagnostic Interpretation: This study was abnormal.     Electrodiagnosis: There is electrodiagnostic evidence of a median mononeuropathy. · Location: left at the wrist.   · Nature: [  ] Axonal   [ X ] Demyelinating  [  ] Mixed axonal and demyelinating     [  ] Sensory [  ] Motor               [ X ] Mixed sensorimotor     [  ] with active denervation       [  X] without active denervation  · Duration: Acute  · Severity: moderate  · Prognosis: Good      Previous Study: no      Follow up EMG is recommended if no surgical intervention and symptoms persist in one year. Technologist: Anali Bajwa  Physician:    Bob Paulino D.O., P.TViviana Board Certified Physical Medicine and Rehabilitation  Board Certified Electrodiagnostic Medicine      Nerve conduction studies and electromyography were performed according to our laboratory policies and procedures which can be provided upon request. All abnormal values are identified in the table.  Laboratory normal values can also be provided upon request.       Cc: Sony Pantoja MD

## 2022-02-08 NOTE — PROGRESS NOTES
[address] [facility]  Electrodiagnostic Laboratory  *Accredited by the [address] [place] [address] Street with exemplary status  [address] [facility] [address]  Phone: [phone]  Fax: [phone]      Date of Examination: 02/08/22  Patient Name: Eladio Wright  is a 49y.o. year old female who was seen today regarding   Chief Complaint   Patient presents with    Extremity Pain     pain in the thumb and wrist on the left side. dull achy. comes and goes. Since Oct. 2021    Numbness     numbness in the first three fingers. worse in the morning.  Extremity Weakness     slight decrease in strength. .  The symptoms started after no injury. The symptoms are intermittent. Previous workup has included: none. Past Medical History:   Diagnosis Date    Arthritis     Depression     HTN (hypertension)     Seasonal allergies        Past Surgical History:   Procedure Laterality Date    CHOLECYSTECTOMY      COLONOSCOPY      DILATION AND CURETTAGE OF UTERUS      TONSILLECTOMY      TOTAL KNEE ARTHROPLASTY Right 5/26/2021    RIGHT KNEE TOTAL KNEE ARTHROPLASTY [facility] & NEPHEW) performed by Waldemar Walker DO at [address] Left 12/1/2021    LEFT TOTAL KNEE  ARTHROPLASTY([facility] & NEPHEW ) performed by Waldemar Walker DO at [address]       There is not family history of neuromuscular conditions. ROS: There has been no associated vision change, hearing change, speech abnormality, swallowing abnormality, or bowel or bladder dysfunction. Physical Exam: General: The patient is in no apparent distress. Height 5' 4\" (1.626 m), weight 240 lb (108.9 kg), not currently breastfeeding. MSK: There is no joint effusion, deformity, instability, swelling, erythema or warmth. AROM is full in the spine and extremities. +tinel left wrist. Neurologic:  No focal sensorimotor deficit. Reflexes 2+ and symmetric. Gait is normal.    Impression:     1.  Carpal tunnel syndrome of left wrist        Plan:   · EMG is indicated to evaluate the above diagnosis. Orders Placed This Encounter   Procedures    AZ NEEDLE EMG EA EXTREMTY W/PARASPINL AREA COMPLETE    [place] MOTOR &/SENS 5-6 NRV CNDJ PRECONF ELTRODE LIMB     · EMG was done today and showed median mononeuropathy at the left wrist clinically consistent with carpal tunnel syndrome. The patient was educated about the diagnosis and the prognosis. · Recommend neutral wrist splints at h.s., OT and/or carpal tunnel injection and if no improvement after 4-6 weeks of conservative treatments consider orthopedic surgery evaluation. Recommend repeating the EMG in 1 year if symptoms persist.    · Advised patient to follow up with referring provider. Thank you for allowing me to participate in the care of your patient.       Sincerely,     Paramjit Aaron DO

## 2022-02-10 DIAGNOSIS — G56.02 LEFT CARPAL TUNNEL SYNDROME: ICD-10-CM

## 2022-02-15 ENCOUNTER — OFFICE VISIT (OUTPATIENT)
Dept: ORTHOPEDIC SURGERY | Age: 57
End: 2022-02-15
Payer: COMMERCIAL

## 2022-02-15 ENCOUNTER — TELEPHONE (OUTPATIENT)
Dept: ORTHOPEDIC SURGERY | Age: 57
End: 2022-02-15

## 2022-02-15 VITALS — BODY MASS INDEX: 40.97 KG/M2 | WEIGHT: 240 LBS | TEMPERATURE: 98 F | HEIGHT: 64 IN

## 2022-02-15 DIAGNOSIS — G56.02 CARPAL TUNNEL SYNDROME OF LEFT WRIST: Primary | ICD-10-CM

## 2022-02-15 PROCEDURE — 99214 OFFICE O/P EST MOD 30 MIN: CPT | Performed by: ORTHOPAEDIC SURGERY

## 2022-02-15 NOTE — TELEPHONE ENCOUNTER
Prior Authorization Form:      DEMOGRAPHICS:                     Patient Name:  Abdulkadir Villegas  Patient :  1965            Insurance:  Payor: [facility] / Plan: [employer] / Product Type: *No Product type* /   Insurance ID Number:    Payor/Plan Subscr  Sex Relation Sub. Ins. ID Effective Group Num   1. [id number] 1965 Male Spouse [id number] 21 [id number]                                    [id number]         DIAGNOSIS & PROCEDURE:                       Procedure/Operation: left carpal tunnel release           CPT Code: 31475    Diagnosis:  Left carpal tunnel    ICD10 Code: G56.02    Location:  [facility]    Surgeon:   Duc Patel    SCHEDULING INFORMATION:                          Date: 3/8/2022    Time: To follow              Anesthesia:  Luis Miguel Block                                                       Status:  Outpatient        Special Comments:  None       Electronically signed by eH Hernandez ATC on 2/15/2022 at 11:50 AM

## 2022-02-15 NOTE — PROGRESS NOTES
Chief Complaint   Patient presents with    Wrist Pain     Left Wrist, F/U, had EMG by Harsha Gaona 02/08/2022       Mary Gonzalez is a 49y.o. year old  female who presents for evaluation of left wrist pain. she reports this started 5+ months. she does not remember a specific injury that started the pain. The injury was none. The pain/numbness is located mainly palm and radial 3 digits. The pain is worse with activity and better with rest.  The patient has tried splints, otc meds. The treatment has not been effective. The patient is right dominant. The patient is employed at [employer]. she is here today for emg results.     Past Medical History:   Diagnosis Date    Arthritis     Depression     HTN (hypertension)     Seasonal allergies      Past Surgical History:   Procedure Laterality Date    CHOLECYSTECTOMY      COLONOSCOPY      DILATION AND CURETTAGE OF UTERUS      TONSILLECTOMY      TOTAL KNEE ARTHROPLASTY Right 5/26/2021    RIGHT KNEE TOTAL KNEE ARTHROPLASTY [facility] & NEPHEW) performed by Nikki Sharif DO at [address] Left 12/1/2021    LEFT TOTAL KNEE  ARTHROPLASTY([facility] & NEPHEW ) performed by Nikki Sharif DO at [address]       Current Outpatient Medications:     atenolol (TENORMIN) 50 MG tablet, atenolol 50 mg tablet  Take 1 tablet every day by oral route., Disp: , Rfl:     cephALEXin (KEFLEX) 500 MG capsule, Take 4 capsules by mouth daily as needed (take 2 g, 1 hour prior to dental procedure), Disp: 4 capsule, Rfl: 0    cetirizine (ZYRTEC) 10 MG tablet, Take 10 mg by mouth daily as needed , Disp: , Rfl:     Elastic Bandages & Supports (KNEE BRACE ADJUSTABLE HINGED) MISC, Wear brace when active, Disp: 1 each, Rfl: 0    KLOR-CON M20 20 MEQ extended release tablet, Take 40 mEq by mouth daily , Disp: , Rfl:     citalopram (CELEXA) 40 MG tablet, Take 40 mg by mouth nightly , Disp: , Rfl:   Allergies   Allergen Reactions    Pcn [Penicillins] Hives Unsure  As a child    Ace Inhibitors Other (See Comments)     unknown    Adhesive Tape Dermatitis     Social History     Socioeconomic History    Marital status:      Spouse name: Not on file    Number of children: Not on file    Years of education: Not on file    Highest education level: Not on file   Occupational History    Not on file   Tobacco Use    Smoking status: Never Smoker    Smokeless tobacco: Never Used   Vaping Use    Vaping Use: Never used   Substance and Sexual Activity    Alcohol use: No    Drug use: Never    Sexual activity: Not on file   Other Topics Concern    Not on file   Social History Narrative    Not on file     Social Determinants of Health     Financial Resource Strain:     Difficulty of Paying Living Expenses: Not on file   Food Insecurity:     Worried About Running Out of Food in the Last Year: Not on file    Marilee of Food in the Last Year: Not on file   Transportation Needs:     Lack of Transportation (Medical): Not on file    Lack of Transportation (Non-Medical):  Not on file   Physical Activity:     Days of Exercise per Week: Not on file    Minutes of Exercise per Session: Not on file   Stress:     Feeling of Stress : Not on file   Social Connections:     Frequency of Communication with Friends and Family: Not on file    Frequency of Social Gatherings with Friends and Family: Not on file    Attends Holiness Services: Not on file    Active Member of [address] [place] [address] [employer] or Organizations: Not on file    Attends Club or Organization Meetings: Not on file    Marital Status: Not on file   Intimate Partner Violence:     Fear of Current or Ex-Partner: Not on file    Emotionally Abused: Not on file    Physically Abused: Not on file    Sexually Abused: Not on file   Housing Stability:     Unable to Pay for Housing in the Last Year: Not on file    Number of [place] in the Last Year: Not on file    Unstable Housing in the Last Year: Not on file     Family History   Problem Relation Age of Onset    Diabetes Mother     Hypertension Mother     High Cholesterol Father        REVIEW OF SYSTEMS:     General/Constitution:  (-)weight loss, (-)fever, (-)chills, (-)weakness. Skin: (-) rash,(-) psoriasis,(-) eczema, (-)skin cancer. Musculoskeletal: (-) fractures,  (-) dislocations,(-) collagen vascular disease, (-) fibromyalgia, (-) multiple sclerosis, (-) muscular dystrophy, (-) RSD,(-) joint pain (-)swelling, (-) joint pain,swelling. Neurologic: (-) epilepsy, (-)seizures,(-) brain tumor,(-) TIA, (-)stroke, (-)headaches, (-)Parkinson disease,(-) memory loss, (-) LOC. Cardiovascular: (-) Chest pain, (-) swelling in legs/feet, (-) SOB, (-) cramping in legs/feet with walking. Respiratory: (-) SOB, (-) Coughing, (-) night sweats. GI: (-) nausea, (-) vomiting, (-) diarrhea, (-) blood in stool, (-) gastric ulcer. Psychiatric: (-) Depression, (-) Anxiety, (-) bipolar disease, (-) Alzheimer's Disease  Allergic/Immunologic: (-) allergies latex, (-) allergies metal, (-) skin sensitivity. Hematlogic: (-) anemia, (-) blood transfusion, (-) DVT/PE, (-) Clotting disorders      Subjective:  _Temp 98 °F (36.7 °C)   Ht 5' 4\" (1.626 m)   Wt 240 lb (108.9 kg)   BMI 41.20 kg/m²  Vital signs are stable. In general, patient is awake, alert and oriented X3, in no apparent distress. Examination of HENT reveals normocephalic, atraumatic. PERRLA/EOMI sclera are white. Conjunctivae are clear. TM's are intact. Pharynx is pink and moist.  Uvula and tongue are midline. Heart: Positive S1 and positive S2 with regular rate and rhythm. Lungs: Clear to auscultation bilaterally without rales, rhonchi or wheezes. Abdomen: soft, nontender. Positive bowel sounds. No organomegaly. No guarding or rigidity.       Constitution:  Temp 98 °F (36.7 °C)   Ht 5' 4\" (1.626 m)   Wt 240 lb (108.9 kg)   BMI 41.20 kg/m²     Psycihatric:  The patient is alert and oriented x 3, appears to be stated age and in no distress. Respiratory:  Respiratory effort is not labored. Patient is not gasping. Palpation of the chest reveals no tactile fremitus. Skin:  Upon inspection: the skin appears warm, dry and intact. There is not a previous scar over the affected area. There is not any cellulitis, lymphedema or cutaneous lesions noted in the lower extremities. Upon palpation there is no induration noted. Neurologic:  Motor exam of the upper extremities show: The reflexes in biceps/triceps/brachioradialis are equal and symmetric. Sensory exam C5-T1 are normal bilaterally. Cardiovascular: The vascular exam is normal and is well perfused to distal extremities. There are 2+ radial pulses bilaterally, and motor and sensation is intact to median, ulnar, and radial, musclocutaneus, and axillary nerve distribution and grossly symmetric bilaterally. There is cap refill noted less than two seconds in all digits. There is not edema of the bilateral upper extremities. There is not varicosities noted in the distal extremities. Lymph:  Upon palpation,  there is no lymphadenopathy noted in bilateral upper extremities. Musculoskeletal:  Gait: normal; examination of the nails and digits reveal no cyanosis or clubbing. Cervical Exam:  On physical exam, Jordan Tarango is well-developed, well-nourished, oriented to person, place and time. her gait is normal.  On evaluation of her cervical spine, she has full range of motion of the cervical spine without pain. There is no cervical tenderness to palpation. Shoulder Exam:  On evaluation of her bilaterally upper extremities, her bilateral shoulder has no deformity. There is not evidence of scapular dyskinesis. There is not muscle atrophy in shoulder girdle. The range of motion for the Right Shoulder is 160/45/T8 and for the Left shoulder is 160/45/T8. Right shoulder Motor strength is 5/5 in the supraspinatus, 5/5 internal rotation and 5/5 in external rotation, and Left shoulder motor strength 5/5 in supraspinatus, 5/5 in internal rotation, 5/5 in external rotation. Elbow exam:  Evaluation of the elbow, reveals no signs of swelling or deformity. ROM is 0-150. There is not instability with varus/valgus stresses. Motor strength is 5/5 with flexion/extension. Wrist exam:  Inspection of the bilateral upper extremities, there is no evidence of deformity of the wrist.  ROM Wrist ROM R wrist DF 70, VF 80, L wrist DF 60, VF 70, R pronation 90/ supination 90, L pronation 80/supination 80. Motor strength is 5/5 with Dorsiflexion/Volarflexion/Supination/Pronation. Motor and sensation is intact and symmetric throughout the bilateral upper extremities in theulnar and radial , musclcutaneous, and axillary nerve distributions. There are paresthesia in the median nerve distribution of the left wrist.    Hand exam:  The skin overlying the hand is not intact. There is not evidence of scar, lesion, laceration, or abrasion. The motion in the small joints of the hand are intact with no stiffness or deformity. The ROM in the MCP flexion WNL/ extension NWL , PIP flexion WNL/ extension WNL, DIP flexion WNL/ extension WNL. There is not rotational deformity. There is no masses or adenopathy in bilateral upper extremities. Radial pulses are 2+ and symmetric bilaterally. Capillary refill is intact and < 2 seconds. Motor strength is 5/5 with flexion and extension of the small finger joints. Right:  Phallens sign(-), Tinnells sign (-), Median nerve compression test (-),  Finklesteins (-), [employer] Grind test (-), [employer](-). Left:    Phallens sign(+), Tinnells sign (+), Median nerve compression test (+),  Finklesteins (-), [employer] Grind test (-), [employer](-). Xrays:   Not performed today. EMG:      Electrodiagnosis: There is electrodiagnostic evidence of a median mononeuropathy.    \" Location: left at the wrist.   \" Nature: [  ] Axonal   [ X ] Demyelinating  [  ] Mixed axonal and demyelinating                      [  ] Sensory [  ] Motor               [ X ] Mixed sensorimotor                      [  ] with active denervation       [  X] without active denervation   \" Duration: Acute   \" Severity: moderate   \" Prognosis: Good       Radiographic findings reviewed with patient    Impression:   Encounter Diagnosis   Name Primary?  Carpal tunnel syndrome of left wrist Yes       Plan: Natural history and expected course discussed. Questions answered. Educational materials distributed. I had a lengthy discussion with the patient regarding their diagnosis. I explained treatment options including surgical vs non surgical treatment. I reviewed in detail the risks and benefits and outlined the procedure in detail with expected outcomes and possible complications. I also discussed non surgical treatment such as injections (CSI ), physical therapy, topical creams and NSAID's. They have elected for surgical management at this time. We will perform a Left carpal tunnel release 3/8/2022. The risks and benefits were reviewed with the patient such as:  DVT, infection,  injuries to blood vessels and nerves, non relief of symptoms, continued pain, worsening of symptoms. At least 30 minutes was spent discussing the diagnosis and treatment options with the patient with at least 50% of the time was spent with decision making and counseling the patient. The patient was counseled at length about the risks of mayra Covid-19 during their perioperative period and any recovery window from their procedure. The patient was made aware that mayra Covid-19  may worsen their prognosis for recovering from their procedure  and lend to a higher morbidity and/or mortality risk. All material risks, benefits, and reasonable alternatives including postponing the procedure were discussed. The patient does wish to proceed with the procedure at this time. Haley Martino

## 2022-02-18 ENCOUNTER — TELEPHONE (OUTPATIENT)
Dept: ADMINISTRATIVE | Age: 57
End: 2022-02-18

## 2022-02-18 NOTE — TELEPHONE ENCOUNTER
Patient called in in regards to her [employer] message. Advised patient per office the appointments can not be combined. Patient expressed understanding. Will keep appointment on 3/9 as is and moved 3/23 appointment earlier in morning. [12:51 PM] Emmy Garcia  MRN: <[id number]> on line to inquire about her 2 PO appointments in March. She wrote a message via [facility] wanting to know if those appointment can be combined.  One is for CT and the other is for her knee    [12:51 PM] Ashely Gonzalez  no they can not be combined   like 1

## 2022-02-20 ASSESSMENT — KOOS JR
GOING UP OR DOWN STAIRS: 2
STANDING UPRIGHT: 0
STRAIGHTENING KNEE FULLY: 0
RISING FROM SITTING: 1

## 2022-02-20 ASSESSMENT — PROMIS GLOBAL HEALTH SCALE
IN THE PAST 7 DAYS, HOW WOULD YOU RATE YOUR FATIGUE ON AVERAGE [ON A SCALE FROM 1 (NONE) TO 5 (VERY SEVERE)]?: 4
SUM OF RESPONSES TO QUESTIONS 3, 6, 7, & 8: 14
TO WHAT EXTENT ARE YOU ABLE TO CARRY OUT YOUR EVERYDAY PHYSICAL ACTIVITIES SUCH AS WALKING, CLIMBING STAIRS, CARRYING GROCERIES, OR MOVING A CHAIR [ON A SCALE OF 1 (NOT AT ALL) TO 5 (COMPLETELY)]?: 4
WHO IS THE PERSON COMPLETING THE PROMIS V1.1 SURVEY?: 0

## 2022-03-01 ENCOUNTER — TELEPHONE (OUTPATIENT)
Dept: ORTHOPEDIC SURGERY | Age: 57
End: 2022-03-01

## 2022-03-01 ENCOUNTER — PATIENT MESSAGE (OUTPATIENT)
Dept: ORTHOPEDIC SURGERY | Age: 57
End: 2022-03-01

## 2022-03-01 RX ORDER — ERYTHROMYCIN 500 MG/1
TABLET, COATED ORAL
Qty: 2 TABLET | Refills: 0 | Status: SHIPPED | OUTPATIENT
Start: 2022-03-01

## 2022-03-01 NOTE — TELEPHONE ENCOUNTER
Patient is going for a dental appointment and needs pre meds. 71y/o M s/p OPCAB x3. Pt seen resting in chair with family at bedside. Lunch tray noted with ~50% consumed. Pt reports feeling tired and not feeling well; decreased appetite post-op. Denies N/V/D/C and mechanical issues. C/o chest pain; CNA aware. PTA family reports pt eats well and denies wt changes. He is non-compliant with diet and denies prior education; a1c 10.3% noted. Attempted to provide diet education, but family requesting to let pt rest as he is not feeling well. Encouraged compliance and discussed the importance; left written material at bedside. Endo also following. RD to follow per policy and re-attempt diet education as appropriate.

## 2022-03-01 NOTE — TELEPHONE ENCOUNTER
From: Eliceo Sanchez  To: Dr. Kelly Whitehead: 3/1/2022 1:23 PM EST  Subject: Prescription needed for dental appointment     Later this month I have a dental appointment and will need a prescription to take before it. Could you please send one to [facility] in [place]?     Thank you,    Elvia Cobb

## 2022-03-03 NOTE — TELEPHONE ENCOUNTER
Patient called and states RX is to expensive. She states she can take cephelaxin and does not have a problem with it. I spoke to [facility] and she said I can cannel the Emycin and to call in the [address]. I left a message on the pharmacy voicemail.

## 2022-03-07 ENCOUNTER — ANESTHESIA EVENT (OUTPATIENT)
Dept: OPERATING ROOM | Age: 57
End: 2022-03-07
Payer: COMMERCIAL

## 2022-03-07 ASSESSMENT — LIFESTYLE VARIABLES: SMOKING_STATUS: 0

## 2022-03-07 NOTE — ANESTHESIA PRE PROCEDURE
Department of Anesthesiology  Preprocedure Note       Name:  Diya Marti   Age:  49 y.o.  :  1965                                          MRN:  [id number]         Date:  3/8/2022      Surgeon: Wesley Cisneros): Calista Whittington DO    Procedure: Procedure(s):  left wrist carpal tunnel release    Medications prior to admission:   Prior to Admission medications    Medication Sig Start Date End Date Taking? Authorizing Provider   atenolol (TENORMIN) 50 MG tablet Take 50 mg by mouth at bedtime    Yes Historical Provider, MD   cetirizine (ZYRTEC) 10 MG tablet Take 10 mg by mouth daily as needed    Yes Historical Provider, MD   KLOR-CON M20 20 MEQ extended release tablet Take 20 mEq by mouth daily  3/29/18  Yes Historical Provider, MD   citalopram (CELEXA) 40 MG tablet Take 40 mg by mouth nightly    Yes Historical Provider, MD   erythromycin base (E-MYCIN) 500 MG tablet Take 2 tablets 1 hour prior to dental work 3/1/22   Annmarie Mcclain APRN - CNP   Elastic Bandages & Supports (KNEE BRACE ADJUSTABLE HINGED) [address] [facility] Wear brace when active 12/3/19   Calista Whittington DO       Current medications:    Current Facility-Administered Medications   Medication Dose Route Frequency Provider Last Rate Last Admin    clindamycin (CLEOCIN) 900 mg in dextrose 5 % 50 mL IVPB  900 mg IntraVENous Once Annmarie Mcclain APRN - CNP        lactated ringers infusion   IntraVENous Continuous Sukhi Hooker  mL/hr at 22 0615 New Bag at 22 0615       Allergies:     Allergies   Allergen Reactions    Pcn [Penicillins] Hives     Unsure  As a child    Ace Inhibitors Other (See Comments)     unknown    Adhesive Tape Dermatitis       Problem List:    Patient Active Problem List   Diagnosis Code    Primary osteoarthritis of right knee M17.11    Primary osteoarthritis of left knee M17.12    Primary osteoarthritis of one knee, left M17.12       Past Medical History:        Diagnosis Date    Arthritis     Depression  HTN (hypertension)     Seasonal allergies        Past Surgical History:        Procedure Laterality Date    CHOLECYSTECTOMY      COLONOSCOPY      DILATION AND CURETTAGE OF UTERUS      TONSILLECTOMY      TOTAL KNEE ARTHROPLASTY Right 5/26/2021    RIGHT KNEE TOTAL KNEE ARTHROPLASTY [facility]) performed by Samantha Silva DO at [address] Left 12/1/2021    LEFT TOTAL KNEE  ARTHROPLASTY([facility] & NEPHEW ) performed by Samantha Silva DO at [address] History:    Social History     Tobacco Use    Smoking status: Never Smoker    Smokeless tobacco: Never Used   Substance Use Topics    Alcohol use: No                                Counseling given: Not Answered      Vital Signs (Current):   Vitals:    03/01/22 1335 03/08/22 0605   BP:  130/70   Pulse:  62   Resp:  16   Temp:  97.9 °F (36.6 °C)   TempSrc:  Skin   SpO2:  96%   Weight: 240 lb (108.9 kg) 244 lb (110.7 kg)   Height: 5' 4\" (1.626 m) 5' 4\" (1.626 m)                                              BP Readings from Last 3 Encounters:   03/08/22 130/70   12/02/21 116/62   12/01/21 110/62       NPO Status: Time of last liquid consumption: 2030                        Time of last solid consumption: 2100                        Date of last liquid consumption: 03/07/22                        Date of last solid food consumption: 03/07/22    BMI:   Wt Readings from Last 3 Encounters:   03/08/22 244 lb (110.7 kg)   02/15/22 240 lb (108.9 kg)   02/08/22 240 lb (108.9 kg)     Body mass index is 41.88 kg/m².     CBC:   Lab Results   Component Value Date    WBC 6.5 11/29/2021    RBC 4.25 11/29/2021    HGB 11.0 12/02/2021    HCT 33.4 12/02/2021    MCV 95.3 11/29/2021    RDW 13.2 11/29/2021     11/29/2021       CMP:   Lab Results   Component Value Date     11/29/2021    K 3.4 11/29/2021     11/29/2021    CO2 28 11/29/2021    BUN 11 11/29/2021    CREATININE 0.8 11/29/2021    GFRAA >60 11/29/2021    LABGLOM >60 11/29/2021    GLUCOSE 91 11/29/2021    PROT 7.5 11/29/2021    CALCIUM 9.5 11/29/2021    BILITOT 0.5 11/29/2021    ALKPHOS 67 11/29/2021    AST 24 11/29/2021    ALT 22 11/29/2021       POC Tests: No results for input(s): POCGLU, POCNA, POCK, POCCL, POCBUN, POCHEMO, POCHCT in the last 72 hours. Coags:   Lab Results   Component Value Date    PROTIME 11.9 11/29/2021    INR 1.0 11/29/2021    APTT 28.0 11/29/2021       HCG (If Applicable): No results found for: PREGTESTUR, PREGSERUM, HCG, HCGQUANT     ABGs: No results found for: PHART, PO2ART, HNN0COL, AOC4UQN, BEART, I5FOOYHY     Type & Screen (If Applicable):  No results found for: LABABO, LABRH    Drug/Infectious Status (If Applicable):  No results found for: HIV, HEPCAB    COVID-19 Screening (If Applicable): No results found for: COVID19        Anesthesia Evaluation  Patient summary reviewed no history of anesthetic complications:   Airway: Mallampati: I  TM distance: >3 FB   Neck ROM: full  Mouth opening: > = 3 FB Dental:          Pulmonary:Negative Pulmonary ROS breath sounds clear to auscultation      (-) not a current smoker          Patient did not smoke on day of surgery. ROS comment: Seasonal Allergies. Cardiovascular:    (+) hypertension:,       ECG reviewed  Rhythm: regular  Rate: normal           Beta Blocker:  Dose within 24 Hrs      ROS comment: EKG: Normal Sinus Rhythm 68. Neuro/Psych:   (+) psychiatric history:            GI/Hepatic/Renal: Neg GI/Hepatic/Renal ROS  (+) morbid obesity          Endo/Other:    (+) : arthritis: OA., .                 Abdominal:   (+) obese,           Vascular: negative vascular ROS. Other Findings:               Anesthesia Plan      Luis Miguel block     ASA 3       Induction: intravenous. MIPS: Postoperative opioids intended and Prophylactic antiemetics administered. Anesthetic plan and risks discussed with patient. Plan discussed with CRNA.             PAT Chart Review:  Chart reviewed per routine by Gm Bloom MD.  Above represents information available via shared medical record including previous anesthesia history, drug and allergy history. Confirmation of above and final plan per Day of Surgery (DOS) anesthesiologist.      DOS STAFF ADDENDUM:    Pt seen and examined, chart reviewed (including anesthesia, drug and allergy history). Anesthetic plan, risks, benefits, alternatives, and personnel involved discussed with patient. Patient verbalized an understanding and agrees to proceed. Plan discussed with care team members and agreed upon.     Gm Bloom MD  Staff Anesthesiologist  6:27 AM

## 2022-03-08 ENCOUNTER — ANESTHESIA (OUTPATIENT)
Dept: OPERATING ROOM | Age: 57
End: 2022-03-08
Payer: COMMERCIAL

## 2022-03-08 ENCOUNTER — HOSPITAL ENCOUNTER (OUTPATIENT)
Age: 57
Setting detail: OUTPATIENT SURGERY
Discharge: HOME | End: 2022-03-08
Attending: ORTHOPAEDIC SURGERY | Admitting: ORTHOPAEDIC SURGERY
Payer: COMMERCIAL

## 2022-03-08 VITALS
BODY MASS INDEX: 41.66 KG/M2 | WEIGHT: 244 LBS | HEIGHT: 64 IN | HEART RATE: 64 BPM | OXYGEN SATURATION: 96 % | DIASTOLIC BLOOD PRESSURE: 48 MMHG | RESPIRATION RATE: 16 BRPM | SYSTOLIC BLOOD PRESSURE: 107 MMHG | TEMPERATURE: 97.9 F

## 2022-03-08 VITALS
RESPIRATION RATE: 20 BRPM | OXYGEN SATURATION: 92 % | SYSTOLIC BLOOD PRESSURE: 96 MMHG | TEMPERATURE: 98.6 F | DIASTOLIC BLOOD PRESSURE: 52 MMHG

## 2022-03-08 DIAGNOSIS — G56.02 CARPAL TUNNEL SYNDROME OF LEFT WRIST: Primary | ICD-10-CM

## 2022-03-08 DIAGNOSIS — Z96.652 S/P TOTAL KNEE ARTHROPLASTY, LEFT: Primary | ICD-10-CM

## 2022-03-08 PROCEDURE — 3700000000 HC ANESTHESIA ATTENDED CARE: Performed by: ORTHOPAEDIC SURGERY

## 2022-03-08 PROCEDURE — 3600000002 HC SURGERY LEVEL 2 BASE: Performed by: ORTHOPAEDIC SURGERY

## 2022-03-08 PROCEDURE — 7100000011 HC PHASE II RECOVERY - ADDTL 15 MIN: Performed by: ORTHOPAEDIC SURGERY

## 2022-03-08 PROCEDURE — 2500000003 HC RX 250 WO HCPCS: Performed by: NURSE PRACTITIONER

## 2022-03-08 PROCEDURE — 3600000012 HC SURGERY LEVEL 2 ADDTL 15MIN: Performed by: ORTHOPAEDIC SURGERY

## 2022-03-08 PROCEDURE — 2580000003 HC RX 258: Performed by: ANESTHESIOLOGY

## 2022-03-08 PROCEDURE — 6360000002 HC RX W HCPCS: Performed by: NURSE ANESTHETIST, CERTIFIED REGISTERED

## 2022-03-08 PROCEDURE — 2500000003 HC RX 250 WO HCPCS: Performed by: NURSE ANESTHETIST, CERTIFIED REGISTERED

## 2022-03-08 PROCEDURE — 7100000010 HC PHASE II RECOVERY - FIRST 15 MIN: Performed by: ORTHOPAEDIC SURGERY

## 2022-03-08 PROCEDURE — 3700000001 HC ADD 15 MINUTES (ANESTHESIA): Performed by: ORTHOPAEDIC SURGERY

## 2022-03-08 PROCEDURE — 64721 CARPAL TUNNEL SURGERY: CPT | Performed by: ORTHOPAEDIC SURGERY

## 2022-03-08 PROCEDURE — 2709999900 HC NON-CHARGEABLE SUPPLY: Performed by: ORTHOPAEDIC SURGERY

## 2022-03-08 RX ORDER — HYDROCODONE BITARTRATE AND ACETAMINOPHEN 5; 325 MG/1; MG/1
1 TABLET ORAL EVERY 6 HOURS PRN
Qty: 12 TABLET | Refills: 0 | Status: SHIPPED | OUTPATIENT
Start: 2022-03-08 | End: 2022-03-11

## 2022-03-08 RX ORDER — MIDAZOLAM HYDROCHLORIDE 1 MG/ML
INJECTION INTRAMUSCULAR; INTRAVENOUS PRN
Status: DISCONTINUED | OUTPATIENT
Start: 2022-03-08 | End: 2022-03-08 | Stop reason: SDUPTHER

## 2022-03-08 RX ORDER — PROCHLORPERAZINE EDISYLATE 5 MG/ML
5 INJECTION INTRAMUSCULAR; INTRAVENOUS
Status: DISCONTINUED | OUTPATIENT
Start: 2022-03-08 | End: 2022-03-08 | Stop reason: HOSPADM

## 2022-03-08 RX ORDER — ONDANSETRON 2 MG/ML
4 INJECTION INTRAMUSCULAR; INTRAVENOUS
Status: DISCONTINUED | OUTPATIENT
Start: 2022-03-08 | End: 2022-03-08 | Stop reason: HOSPADM

## 2022-03-08 RX ORDER — LIDOCAINE HYDROCHLORIDE 5 MG/ML
INJECTION, SOLUTION INFILTRATION; INTRAVENOUS PRN
Status: DISCONTINUED | OUTPATIENT
Start: 2022-03-08 | End: 2022-03-08 | Stop reason: SDUPTHER

## 2022-03-08 RX ORDER — MEPERIDINE HYDROCHLORIDE 25 MG/ML
12.5 INJECTION INTRAMUSCULAR; INTRAVENOUS; SUBCUTANEOUS EVERY 5 MIN PRN
Status: DISCONTINUED | OUTPATIENT
Start: 2022-03-08 | End: 2022-03-08 | Stop reason: HOSPADM

## 2022-03-08 RX ORDER — FENTANYL CITRATE 50 UG/ML
25 INJECTION, SOLUTION INTRAMUSCULAR; INTRAVENOUS EVERY 5 MIN PRN
Status: DISCONTINUED | OUTPATIENT
Start: 2022-03-08 | End: 2022-03-08 | Stop reason: HOSPADM

## 2022-03-08 RX ORDER — CLINDAMYCIN PHOSPHATE 900 MG/50ML
900 INJECTION INTRAVENOUS ONCE
Status: COMPLETED | OUTPATIENT
Start: 2022-03-08 | End: 2022-03-08

## 2022-03-08 RX ORDER — LIDOCAINE HYDROCHLORIDE 20 MG/ML
INJECTION, SOLUTION INFILTRATION; PERINEURAL PRN
Status: DISCONTINUED | OUTPATIENT
Start: 2022-03-08 | End: 2022-03-08 | Stop reason: SDUPTHER

## 2022-03-08 RX ORDER — FENTANYL CITRATE 50 UG/ML
INJECTION, SOLUTION INTRAMUSCULAR; INTRAVENOUS PRN
Status: DISCONTINUED | OUTPATIENT
Start: 2022-03-08 | End: 2022-03-08 | Stop reason: SDUPTHER

## 2022-03-08 RX ORDER — MORPHINE SULFATE 2 MG/ML
2 INJECTION, SOLUTION INTRAMUSCULAR; INTRAVENOUS EVERY 5 MIN PRN
Status: DISCONTINUED | OUTPATIENT
Start: 2022-03-08 | End: 2022-03-08 | Stop reason: HOSPADM

## 2022-03-08 RX ORDER — SODIUM CHLORIDE, SODIUM LACTATE, POTASSIUM CHLORIDE, CALCIUM CHLORIDE 600; 310; 30; 20 MG/100ML; MG/100ML; MG/100ML; MG/100ML
INJECTION, SOLUTION INTRAVENOUS CONTINUOUS
Status: DISCONTINUED | OUTPATIENT
Start: 2022-03-08 | End: 2022-03-08 | Stop reason: HOSPADM

## 2022-03-08 RX ORDER — PROPOFOL 10 MG/ML
INJECTION, EMULSION INTRAVENOUS CONTINUOUS PRN
Status: DISCONTINUED | OUTPATIENT
Start: 2022-03-08 | End: 2022-03-08 | Stop reason: SDUPTHER

## 2022-03-08 RX ADMIN — LIDOCAINE HYDROCHLORIDE 50 ML: 5 INJECTION, SOLUTION INFILTRATION; INTRAVENOUS at 07:08

## 2022-03-08 RX ADMIN — SODIUM CHLORIDE, POTASSIUM CHLORIDE, SODIUM LACTATE AND CALCIUM CHLORIDE: 600; 310; 30; 20 INJECTION, SOLUTION INTRAVENOUS at 06:15

## 2022-03-08 RX ADMIN — FENTANYL CITRATE 50 MCG: 50 INJECTION, SOLUTION INTRAMUSCULAR; INTRAVENOUS at 07:11

## 2022-03-08 RX ADMIN — CLINDAMYCIN PHOSPHATE 900 MG: 18 INJECTION, SOLUTION INTRAVENOUS at 07:03

## 2022-03-08 RX ADMIN — PROPOFOL 75 MCG/KG/MIN: 10 INJECTION, EMULSION INTRAVENOUS at 07:11

## 2022-03-08 RX ADMIN — FENTANYL CITRATE 50 MCG: 50 INJECTION, SOLUTION INTRAMUSCULAR; INTRAVENOUS at 07:06

## 2022-03-08 RX ADMIN — LIDOCAINE HYDROCHLORIDE 50 MG: 20 INJECTION, SOLUTION INFILTRATION; PERINEURAL at 07:11

## 2022-03-08 RX ADMIN — MIDAZOLAM 2 MG: 1 INJECTION INTRAMUSCULAR; INTRAVENOUS at 07:05

## 2022-03-08 ASSESSMENT — PULMONARY FUNCTION TESTS
PIF_VALUE: 1
PIF_VALUE: 1
PIF_VALUE: 3
PIF_VALUE: 2
PIF_VALUE: 4
PIF_VALUE: 6
PIF_VALUE: 2
PIF_VALUE: 8
PIF_VALUE: 1
PIF_VALUE: 10
PIF_VALUE: 1
PIF_VALUE: 1
PIF_VALUE: 18
PIF_VALUE: 1
PIF_VALUE: 13
PIF_VALUE: 3

## 2022-03-08 ASSESSMENT — PAIN SCALES - GENERAL
PAINLEVEL_OUTOF10: 0

## 2022-03-08 ASSESSMENT — PAIN - FUNCTIONAL ASSESSMENT: PAIN_FUNCTIONAL_ASSESSMENT: 0-10

## 2022-03-08 NOTE — ANESTHESIA POSTPROCEDURE EVALUATION
Department of Anesthesiology  Postprocedure Note    Patient: Carolyn Patel  MRN: [id number]  YOB: 1965  Date of evaluation: 3/8/2022  Time:  9:20 AM     Procedure Summary     Date: 03/08/22 Room / Location: [address] [place] [address] 01 / [address] [facility]    Anesthesia Start: 0703 Anesthesia Stop: 6029    Procedure: left wrist carpal tunnel release (Left ) Diagnosis: (LEFT WRIST CARPAL TUNNEL)    Surgeons: Marvin Almanza DO Responsible Provider: Obed Riggins MD    Anesthesia Type: MAC ASA Status: 3          Anesthesia Type: MAC    Reena Phase I: Reena Score: 10    Reena Phase II: Reena Score: 10    Last vitals: Reviewed and per EMR flowsheets.        Anesthesia Post Evaluation    Patient location during evaluation: PACU  Patient participation: complete - patient participated  Level of consciousness: awake  Airway patency: patent  Nausea & Vomiting: no nausea and no vomiting  Complications: no  Cardiovascular status: hemodynamically stable  Respiratory status: acceptable  Hydration status: euvolemic

## 2022-03-08 NOTE — OP NOTE
Operative Note      Patient: Nguyen Vega  YOB: 1965  MRN: [id number]    Date of Procedure: 3/8/2022    Pre-Op Diagnosis: LEFT WRIST CARPAL TUNNEL    Post-Op Diagnosis: Same       Procedure(s):  left wrist carpal tunnel release    Surgeon(s): Bette Saab DO    Assistant:   * No surgical staff found *    Anesthesia: Revere Block    Estimated Blood Loss (mL): Minimal    Complications: None    Specimens:   * No specimens in log *    Implants:  * No implants in log *      Drains: * No LDAs found *    Findings: as abvoe    Detailed Description of Procedure:   below    SURGEON: [address] [facility], D.O.   ASSISTANT: as above  PREOPERATIVE DIAGNOSIS: Left wrist carpal tunnel syndrome. POSTOPERATIVE DIAGNOSIS: Left wrist carpal tunnel syndrome. PROCEDURE: Release transverse carpal ligament, Left wrist.   ANESTHESIA: bb  ESTIMATED BLOOD LOSS: minimal in degree  COMPLICATIONS: None. Brief Hospital Course: The  patients well  known to Rosalba Jurado DO's practice with persistent complaints of left wrist/hand pain and numbness. Wrsit and hand pain has failed to be relieved by non-operative conservative measures, and has began affecting daily activities of living. After examination of the patient, review of the EMG, radiologic studies, and appropriate pre-operative risk assessment, Rosalba Jurado DO recommended left carpal tunnel release,  which the patient was agreeable towards. OPERATIVE PROCEDURE: The patient was brought to the operating suite and was   given anesthesia. The left arm was identified with a   preoperative time-out, the arm was prepped and draped in sterile fashion, I  outlined incision along the volar side of the wrist just ulnar to the thenar   wrist crease. I made an approximately 2 to 4-cm incision over this area through the skin   and subcutaneous tissue. I dissected that down to the level of the palmar   fascia. It was identified and I released it sharply.  I Identified the transverse carpal ligament and incised this with a 15-blade. Using tenotomy   scissors, I finished the release of the ligament proximally and distally to its full extent. I thoroughly irrigated the wound upon closure. I closed the incision with 4-0 Prolene in a horizontal mattress type fashion. Sterile dressing was placed on the wound. The patient recovered to the   recovery room without difficulty.        Electronically signed by Davis Bautista DO on 3/8/2022 at 6:54 AM

## 2022-03-08 NOTE — H&P
Updated H&P    Chief Complaint   Patient presents with    Wrist Pain       Left Wrist, F/U, had EMG by  02/08/2022         Cheryl Hennessy is a 49y.o. year old  female who presents for evaluation of left wrist pain. she reports this started 5+ months. she does not remember a specific injury that started the pain. The injury was none. The pain/numbness is located mainly palm and radial 3 digits. The pain is worse with activity and better with rest.  The patient has tried splints, otc meds. The treatment has not been effective. The patient is right dominant. The patient is employed at [employer]. she is here today for emg results.     Past Medical History        Past Medical History:   Diagnosis Date    Arthritis      Depression      HTN (hypertension)      Seasonal allergies           Past Surgical History         Past Surgical History:   Procedure Laterality Date    CHOLECYSTECTOMY        COLONOSCOPY        DILATION AND CURETTAGE OF UTERUS        TONSILLECTOMY        TOTAL KNEE ARTHROPLASTY Right 5/26/2021     RIGHT KNEE TOTAL KNEE ARTHROPLASTY [facility] & [facility]) performed by Calista Whittington DO at [address] [facility] Left 12/1/2021     LEFT TOTAL KNEE  ARTHROPLASTY([facility] & [facility] ) performed by Cailsta Whittington DO at [address]           Current Medication      Current Outpatient Medications:     atenolol (TENORMIN) 50 MG tablet, atenolol 50 mg tablet  Take 1 tablet every day by oral route., Disp: , Rfl:     cephALEXin (KEFLEX) 500 MG capsule, Take 4 capsules by mouth daily as needed (take 2 g, 1 hour prior to dental procedure), Disp: 4 capsule, Rfl: 0    cetirizine (ZYRTEC) 10 MG tablet, Take 10 mg by mouth daily as needed , Disp: , Rfl:     Elastic Bandages & Supports (KNEE BRACE ADJUSTABLE HINGED) MISC, Wear brace when active, Disp: 1 each, Rfl: 0    KLOR-CON M20 20 MEQ extended release tablet, Take 40 mEq by mouth daily , Disp: , Rfl:     citalopram (CELEXA) 40 MG tablet, Take 40 mg by mouth nightly , Disp: , Rfl:            Allergies   Allergen Reactions    Pcn [Penicillins] Hives       Unsure  As a child    Ace Inhibitors Other (See Comments)       unknown    Adhesive Tape Dermatitis      Social History               Socioeconomic History    Marital status:        Spouse name: Not on file    Number of children: Not on file    Years of education: Not on file    Highest education level: Not on file   Occupational History    Not on file   Tobacco Use    Smoking status: Never Smoker    Smokeless tobacco: Never Used   Vaping Use    Vaping Use: Never used   Substance and Sexual Activity    Alcohol use: No    Drug use: Never    Sexual activity: Not on file   Other Topics Concern    Not on file   Social History Narrative    Not on file      Social Determinants of Health          Financial Resource Strain:     Difficulty of Paying Living Expenses: Not on file   Food Insecurity:     Worried About Running Out of Food in the Last Year: Not on file    Marilee of Food in the Last Year: Not on file   Transportation Needs:     Lack of Transportation (Medical): Not on file    Lack of Transportation (Non-Medical):  Not on file   Physical Activity:     Days of Exercise per Week: Not on file    Minutes of Exercise per Session: Not on file   Stress:     Feeling of Stress : Not on file   Social Connections:     Frequency of Communication with Friends and Family: Not on file    Frequency of Social Gatherings with Friends and Family: Not on file    Attends Evangelical Services: Not on file    Active Member of Clubs or Organizations: Not on file    Attends Club or Organization Meetings: Not on file    Marital Status: Not on file   Intimate Partner Violence:     Fear of Current or Ex-Partner: Not on file    Emotionally Abused: Not on file    Physically Abused: Not on file    Sexually Abused: Not on file   Housing Stability:     Unable to Pay for Housing in the Last Year: Not on file    Number of Places Lived in the Last Year: Not on file    Unstable Housing in the Last Year: Not on file         Family History         Family History   Problem Relation Age of Onset    Diabetes Mother      Hypertension Mother      High Cholesterol Father              REVIEW OF SYSTEMS:      General/Constitution:  (-)weight loss, (-)fever, (-)chills, (-)weakness. Skin: (-) rash,(-) psoriasis,(-) eczema, (-)skin cancer. Musculoskeletal: (-) fractures,  (-) dislocations,(-) collagen vascular disease, (-) fibromyalgia, (-) multiple sclerosis, (-) muscular dystrophy, (-) RSD,(-) joint pain (-)swelling, (-) joint pain,swelling. Neurologic: (-) epilepsy, (-)seizures,(-) brain tumor,(-) TIA, (-)stroke, (-)headaches, (-)Parkinson disease,(-) memory loss, (-) LOC. Cardiovascular: (-) Chest pain, (-) swelling in legs/feet, (-) SOB, (-) cramping in legs/feet with walking. Respiratory: (-) SOB, (-) Coughing, (-) night sweats. GI: (-) nausea, (-) vomiting, (-) diarrhea, (-) blood in stool, (-) gastric ulcer. Psychiatric: (-) Depression, (-) Anxiety, (-) bipolar disease, (-) Alzheimer's Disease  Allergic/Immunologic: (-) allergies latex, (-) allergies metal, (-) skin sensitivity.   Hematlogic: (-) anemia, (-) blood transfusion, (-) DVT/PE, (-) Clotting disorders        Subjective:      Vital signs are stable.  In general, patient is awake, alert and oriented X3, in no apparent distress.  Examination of HENT reveals normocephalic, atraumatic.  PERRLA/EOMI sclera are white.  Conjunctivae are clear.  TM's are intact.  Pharynx is pink and moist.  Uvula and tongue are midline.  Heart: Positive S1 and positive S2 with regular rate and rhythm.  Lungs: Clear to auscultation bilaterally without rales, rhonchi or wheezes.  Abdomen: soft, nontender.  Positive bowel sounds.  No organomegaly.  No guarding or rigidity.        Constitution:  /70   Pulse 62   Temp 97.9 °F (36.6 °C) (Skin) Resp 16   Ht 5' 4\" (1.626 m)   Wt 244 lb (110.7 kg)   SpO2 96%   BMI 41.88 kg/m²        Psycihatric:  The patient is alert and oriented x 3, appears to be stated age and in no distress.       Respiratory:  Respiratory effort is not labored. Patient is not gasping. Palpation of the chest reveals no tactile fremitus.     Skin:  Upon inspection: the skin appears warm, dry and intact. There is not a previous scar over the affected area. There is not any cellulitis, lymphedema or cutaneous lesions noted in the lower extremities. Upon palpation there is no induration noted.       Neurologic:  Motor exam of the upper extremities show: The reflexes in biceps/triceps/brachioradialis are equal and symmetric. Sensory exam C5-T1 are normal bilaterally. Cardiovascular: The vascular exam is normal and is well perfused to distal extremities. There are 2+ radial pulses bilaterally, and motor and sensation is intact to median, ulnar, and radial, musclocutaneus, and axillary nerve distribution and grossly symmetric bilaterally. There is cap refill noted less than two seconds in all digits. There is not edema of the bilateral upper extremities. There is not varicosities noted in the distal extremities.       Lymph:  Upon palpation,  there is no lymphadenopathy noted in bilateral upper extremities.       Musculoskeletal:  Gait: normal; examination of the nails and digits reveal no cyanosis or clubbing.     Cervical Exam:  On physical exam, Ja Neumann is well-developed, well-nourished, oriented to person, place and time. her gait is normal.  On evaluation of her cervical spine, she has full range of motion of the cervical spine without pain. There is no cervical tenderness to palpation.      Shoulder Exam:  On evaluation of her bilaterally upper extremities, her bilateral shoulder has no deformity. There is not evidence of scapular dyskinesis. There is not muscle atrophy in shoulder girdle.  The range of motion for the Right Shoulder is 160/45/T8 and for the Left shoulder is 160/45/T8. Right shoulder Motor strength is 5/5 in the supraspinatus, 5/5 internal rotation and 5/5 in external rotation, and Left shoulder motor strength 5/5 in supraspinatus, 5/5 in internal rotation, 5/5 in external rotation. Elbow exam:  Evaluation of the elbow, reveals no signs of swelling or deformity. ROM is 0-150. There is not instability with varus/valgus stresses. Motor strength is 5/5 with flexion/extension.      Wrist exam:  Inspection of the bilateral upper extremities, there is no evidence of deformity of the wrist.  ROM Wrist ROM R wrist DF 70, VF 80, L wrist DF 60, VF 70, R pronation 90/ supination 90, L pronation 80/supination 80. Motor strength is 5/5 with Dorsiflexion/Volarflexion/Supination/Pronation. Motor and sensation is intact and symmetric throughout the bilateral upper extremities in theulnar and radial , musclcutaneous, and axillary nerve distributions. There are paresthesia in the median nerve distribution of the left wrist.     Hand exam:  The skin overlying the hand is not intact. There is not evidence of scar, lesion, laceration, or abrasion. The motion in the small joints of the hand are intact with no stiffness or deformity. The ROM in the MCP flexion WNL/ extension NWL , PIP flexion WNL/ extension WNL, DIP flexion WNL/ extension WNL. There is not rotational deformity. There is no masses or adenopathy in bilateral upper extremities. Radial pulses are 2+ and symmetric bilaterally. Capillary refill is intact and < 2 seconds. Motor strength is 5/5 with flexion and extension of the small finger joints.      Right:  Phallens sign(-), Tinnells sign (-), Median nerve compression test (-),  Finklesteins (-), [employer] Grind test (-), [employer](-). Left:     Phallens sign(+), Tinnells sign (+), Median nerve compression test (+),  Finklesteins (-), [employer] Grind test (-), [employer](-).       Xrays: Not performed today.     EMG:      Electrodiagnosis: There is electrodiagnostic evidence of a median mononeuropathy. \" Location: left at the wrist.   \" Nature: [  ] Axonal   [ X ] Demyelinating  [  ] Mixed axonal and demyelinating                      [  ] Sensory [  ] Motor               [ X ] Mixed sensorimotor                      [  ] with active denervation       [  X] without active denervation   \" Duration: Acute   \" Severity: moderate   \" Prognosis: Good       Radiographic findings reviewed with patient     Impression:        Encounter Diagnosis   Name Primary?  Carpal tunnel syndrome of left wrist Yes         Plan: Natural history and expected course discussed. Questions answered. Educational materials distributed. I had a lengthy discussion with the patient regarding their diagnosis. I explained treatment options including surgical vs non surgical treatment. I reviewed in detail the risks and benefits and outlined the procedure in detail with expected outcomes and possible complications. I also discussed non surgical treatment such as injections (CSI ), physical therapy, topical creams and NSAID's. They have elected for surgical management at this time. We will perform a Left carpal tunnel release 3/8/2022. The risks and benefits were reviewed with the patient such as:  DVT, infection,  injuries to blood vessels and nerves, non relief of symptoms, continued pain, worsening of symptoms.          The patient was counseled at length about the risks of mayra Covid-19 during their perioperative period and any recovery window from their procedure.  The patient was made aware that mayra Covid-19  may worsen their prognosis for recovering from their procedure  and lend to a higher morbidity and/or mortality risk.  All material risks, benefits, and reasonable alternatives including postponing the procedure were discussed.  The patient does wish to proceed with the procedure at this time.           .

## 2022-03-09 ENCOUNTER — OFFICE VISIT (OUTPATIENT)
Dept: ORTHOPEDIC SURGERY | Age: 57
End: 2022-03-09
Payer: COMMERCIAL

## 2022-03-09 VITALS — WEIGHT: 244 LBS | HEIGHT: 64 IN | BODY MASS INDEX: 41.66 KG/M2

## 2022-03-09 DIAGNOSIS — Z96.652 S/P TOTAL KNEE ARTHROPLASTY, LEFT: Primary | ICD-10-CM

## 2022-03-09 PROCEDURE — 99213 OFFICE O/P EST LOW 20 MIN: CPT | Performed by: NURSE PRACTITIONER

## 2022-03-09 NOTE — PROGRESS NOTES
Chief Complaint   Patient presents with    Knee Pain     Left TKA follow up. DOS 12/1/2021       Ketan Goins returns today for follow-up of her left TKA. DOS: 12/1/2021. She reports this is better than when I saw the patient last.      Past Medical History:   Diagnosis Date    Arthritis     Depression     HTN (hypertension)     Seasonal allergies      Past Surgical History:   Procedure Laterality Date    CARPAL TUNNEL RELEASE Left 3/8/2022    left wrist carpal tunnel release performed by Roshan Talamantes DO at [address] Right 5/26/2021    RIGHT KNEE TOTAL KNEE ARTHROPLASTY [facility]) performed by Roshan Talamantes DO at [address] Left 12/1/2021    LEFT TOTAL KNEE  ARTHROPLASTY([facility] & NEPHEW ) performed by Roshan Talamantes DO at [address]       Current Outpatient Medications:     HYDROcodone-acetaminophen (NORCO) 5-325 MG per tablet, Take 1 tablet by mouth every 6 hours as needed for Pain for up to 3 days. Intended supply: 3 days.  Take lowest dose possible to manage pain, Disp: 12 tablet, Rfl: 0    erythromycin base (E-MYCIN) 500 MG tablet, Take 2 tablets 1 hour prior to dental work, Disp: 2 tablet, Rfl: 0    atenolol (TENORMIN) 50 MG tablet, Take 50 mg by mouth at bedtime , Disp: , Rfl:     cetirizine (ZYRTEC) 10 MG tablet, Take 10 mg by mouth daily as needed , Disp: , Rfl:     KLOR-CON M20 20 MEQ extended release tablet, Take 20 mEq by mouth daily , Disp: , Rfl:     citalopram (CELEXA) 40 MG tablet, Take 40 mg by mouth nightly , Disp: , Rfl:   Allergies   Allergen Reactions    Pcn [Penicillins] Hives     Unsure  As a child    Ace Inhibitors Other (See Comments)     unknown    Adhesive Tape Dermatitis     Social History     Socioeconomic History    Marital status:      Spouse name: Not on file    Number of children: Not on file  Years of education: Not on file    Highest education level: Not on file   Occupational History    Not on file   Tobacco Use    Smoking status: Never Smoker    Smokeless tobacco: Never Used   Vaping Use    Vaping Use: Never used   Substance and Sexual Activity    Alcohol use: No    Drug use: Never    Sexual activity: Not on file   Other Topics Concern    Not on file   Social History Narrative    Not on file     Social Determinants of Health     Financial Resource Strain:     Difficulty of Paying Living Expenses: Not on file   Food Insecurity:     Worried About Running Out of Food in the Last Year: Not on file    Marilee of Food in the Last Year: Not on file   Transportation Needs:     Lack of Transportation (Medical): Not on file    Lack of Transportation (Non-Medical): Not on file   Physical Activity:     Days of Exercise per Week: Not on file    Minutes of Exercise per Session: Not on file   Stress:     Feeling of Stress : Not on file   Social Connections:     Frequency of Communication with Friends and Family: Not on file    Frequency of Social Gatherings with Friends and Family: Not on file    Attends Roman Catholic Services: Not on file    Active Member of [address] [place] [address] or Organizations: Not on file    Attends Club or Organization Meetings: Not on file    Marital Status: Not on file   Intimate Partner Violence:     Fear of Current or Ex-Partner: Not on file    Emotionally Abused: Not on file    Physically Abused: Not on file    Sexually Abused: Not on file   Housing Stability:     Unable to Pay for Housing in the Last Year: Not on file    Number of [place] in the Last Year: Not on file    Unstable Housing in the Last Year: Not on file       Review of Systems:     Skin: (-) rash,(-) psoriasis,(-) eczema, (-)skin cancer.    Musculoskeletal: (-) fractures,  (-) dislocations,(-) collagen vascular disease, (-) fibromyalgia, (-) multiple sclerosis, (-) muscular dystrophy, (-) RSD,(-) joint pain (-)swelling, (-) joint pain,swelling. Neurologic: (-) epilepsy, (-)seizures,(-) brain tumor,(-) TIA, (-)stroke, (-)headaches, (-)Parkinson disease,(-) memory loss, (-) LOC. Cardiovascular: (-) Chest pain, (-) swelling in legs/feet, (-) SOB, (-) cramping in legs/feet with walking. Subjective:    Constitutional:    The patient is alert and oriented x 3, appears to be stated age and in no distress. Ht 5' 4\" (1.626 m)   Wt 244 lb (110.7 kg)   BMI 41.88 kg/m²     Skin:  Upon inspection: the skin appears warm, dry and intact. There is a previous scar over the affected area. There is not any cellulitis, lymphedema or cutaneous lesions noted in the lower extremities. Upon palpation there is no induration noted. Neurologic:  Gait: antalgic; Motor exam of the lower extremities show ; quadriceps, hamstrings, foot dorsi and plantar flexors intact R.  5/5 and L. 5/5. Deep tendon reflexes are 2/4 at the knees and 2/4 at the ankles with strong extensor hallicus longus motor strength bilaterally. Sensory to both feet is intact to all sensory roots. Cardiovascular: The vascular exam is normal and is well perfused to distal extremities. Distal pulses DP/PT: R. 2+; L. 2+. There is cap refill noted less than two seconds in all digits. There is not edema of the bilateral lower extremities. There is not varicosities noted in the distal extremities. Lymph:  Upon palpation,  there is no lymphadenopathy noted in bilateral lower extremities. Musculoskeletal:    Lumbar exam:  On visual inspection, there is not deformity of the spine. full range of motion, no tenderness, palpable spasm or pain on motion. Special tests: Straight Leg Raise negative, Rubén testnegative. Hip exam:  Upon inspection, there is not deformity noted. Upon palpation there is not tenderness. ROM: is   full and symmetrical.   Strength: Hip Flexors 5/5; Hip Abductors 5/5; Hip Adduction 5/5.      Knee exam:  Left knee exam shows; range of motion of R. Knee is 0 to 120, and L. Knee is 0 to 110. The patient does not have  pain on motion, effusion is mild, there is tenderness over the  medial, lateral, anterior region, there are not any masses, there is not ligamentous instability, there is not  deformity noted. Knee exam: neither positive for moderate crepitations, some mild tenderness laxity is not noted with  stress. R. Knee:  Lachman's negative, Anterior Drawer negative, Posterior Drawer negative  Tigre's negative, Thallasy  negative,   PF grind test negative, Apprehension test negative, Patellar J sign  negative  L. Knee:  Lachman's negative, Anterior Drawer negative, Posterior Drawer negative  Tigre's negative, Thallasy  negative,   PF grind test negative, Apprehension test negative,  Patellar J sign  Negative    Xrays: left TKA well aligned with no signs of aseptic loosening    Radiographic findings reviewed with patient    Impression:   Encounter Diagnosis   Name Primary?     S/P total knee arthroplasty, left Yes       Plan:  Hep  Activity as tolerated  Fu in 3 months with xr

## 2022-03-23 ENCOUNTER — OFFICE VISIT (OUTPATIENT)
Dept: ORTHOPEDIC SURGERY | Age: 57
End: 2022-03-23

## 2022-03-23 VITALS — BODY MASS INDEX: 40.97 KG/M2 | HEIGHT: 64 IN | TEMPERATURE: 98 F | WEIGHT: 240 LBS

## 2022-03-23 DIAGNOSIS — G56.02 CARPAL TUNNEL SYNDROME OF LEFT WRIST: Primary | ICD-10-CM

## 2022-03-23 PROCEDURE — 99024 POSTOP FOLLOW-UP VISIT: CPT | Performed by: NURSE PRACTITIONER

## 2022-03-23 NOTE — PROGRESS NOTES
Subjective:     Zafar Gonzalez is here for followup after left carpal tunnel surgery. The patient is not having any pain. The patient notes improvement in the following symptoms: strength, numbness, pain, sensation. The patient denies fever, wound drainage, increasing redness, pus, increasing pain, increasing swelling. Post op problems reported: none. Objective:       General :    alert, appears stated age and cooperative   Sutures:   Sutures in place and will be removed today. Incision:  healing well, no significant drainage, no dehiscence, no significant erythema   Tenderness:  none   Flexion ROM:  full range of motion   Extension ROM:  full range of motion   Effusion:  none        Assessment:     Encounter Diagnosis   Name Primary?  Carpal tunnel syndrome of left wrist Yes     Plan:   Sutures removed today. Range of motion and rehabilitation exercises discussed with the patient. HEP  Follow up in 4 weeks.   Call with any questions or concerns at [phone]

## 2022-04-27 ENCOUNTER — OFFICE VISIT (OUTPATIENT)
Dept: ORTHOPEDIC SURGERY | Age: 57
End: 2022-04-27

## 2022-04-27 VITALS — HEIGHT: 64 IN | BODY MASS INDEX: 40.97 KG/M2 | WEIGHT: 240 LBS

## 2022-04-27 DIAGNOSIS — G56.02 CARPAL TUNNEL SYNDROME OF LEFT WRIST: Primary | ICD-10-CM

## 2022-04-27 PROCEDURE — 99024 POSTOP FOLLOW-UP VISIT: CPT | Performed by: NURSE PRACTITIONER

## 2022-04-27 NOTE — PROGRESS NOTES
Subjective:     Long Lucas is here for followup after left carpal tunnel surgery. The patient is not having any pain. . The patient notes improvement in the following symptoms:strength, numbness, pain, sensation. The patient denies fever, wound drainage, increasing redness, pus, increasing pain, increasing swelling. Post op problems reported: tenderness to incision. Objective:         General :    alert, appears stated age and cooperative   Sutures:   Sutures out. Incision:  healing well, no significant drainage, no dehiscence, no significant erythema, tender   Tenderness:  none   Flexion ROM:  full range of motion   Extension ROM:  full range of motion   Effusion:  no         Assessment:     Encounter Diagnosis   Name Primary?  Carpal tunnel syndrome of left wrist Yes       Plan:   OT  Range of motion and rehabilitation exercises discussed with the patient.   Follow up 6-8 weeks  Please call our office with any questions or concerns at [phone]

## 2022-05-25 DIAGNOSIS — Z96.652 S/P TOTAL KNEE ARTHROPLASTY, LEFT: Primary | ICD-10-CM

## 2022-05-26 ENCOUNTER — OFFICE VISIT (OUTPATIENT)
Dept: ORTHOPEDIC SURGERY | Age: 57
End: 2022-05-26
Payer: COMMERCIAL

## 2022-05-26 VITALS — TEMPERATURE: 98 F | HEIGHT: 64 IN | BODY MASS INDEX: 41.07 KG/M2 | WEIGHT: 240.6 LBS

## 2022-05-26 DIAGNOSIS — M25.561 RIGHT KNEE PAIN, UNSPECIFIED CHRONICITY: Primary | ICD-10-CM

## 2022-05-26 DIAGNOSIS — Z96.652 S/P TOTAL KNEE ARTHROPLASTY, LEFT: Primary | ICD-10-CM

## 2022-05-26 DIAGNOSIS — Z96.651 STATUS POST RIGHT KNEE REPLACEMENT: ICD-10-CM

## 2022-05-26 PROCEDURE — 99213 OFFICE O/P EST LOW 20 MIN: CPT | Performed by: NURSE PRACTITIONER

## 2022-05-26 NOTE — PROGRESS NOTES
Chief Complaint   Patient presents with    Follow-up     Right knee replacement f/u. States she is not having pain in her knees just some mild discomfort after exertion all day. Able to do her ADL's w/o issues. Jacques Resendiz returns today for follow-up of her left TKA. DOS: 12/1/2021 and right tka [facility] 5/26/21.  She reports this is better than when I saw the patient last.      Past Medical History:   Diagnosis Date    Arthritis     Depression     HTN (hypertension)     Seasonal allergies      Past Surgical History:   Procedure Laterality Date    CARPAL TUNNEL RELEASE Left 3/8/2022    left wrist carpal tunnel release performed by Akilah Osman DO at [facility]      TONSILLECTOMY      TOTAL KNEE ARTHROPLASTY Right 5/26/2021    RIGHT KNEE TOTAL KNEE ARTHROPLASTY [facility] & NEPHEW) performed by Akilah Osman DO at [facility] Left 12/1/2021    LEFT TOTAL KNEE  ARTHROPLASTY([facility] & NEPHEW ) performed by Akilah Osman DO at [address]       Current Outpatient Medications:     erythromycin base (E-MYCIN) 500 MG tablet, Take 2 tablets 1 hour prior to dental work, Disp: 2 tablet, Rfl: 0    atenolol (TENORMIN) 50 MG tablet, Take 50 mg by mouth at bedtime , Disp: , Rfl:     cetirizine (ZYRTEC) 10 MG tablet, Take 10 mg by mouth daily as needed , Disp: , Rfl:     KLOR-CON M20 20 MEQ extended release tablet, Take 20 mEq by mouth daily  (Patient not taking: Reported on 5/26/2022), Disp: , Rfl:     citalopram (CELEXA) 40 MG tablet, Take 40 mg by mouth nightly  (Patient not taking: Reported on 5/26/2022), Disp: , Rfl:   Allergies   Allergen Reactions    Pcn [Penicillins] Hives     Unsure  As a child    Ace Inhibitors Other (See Comments)     unknown    Adhesive Tape Dermatitis     Social History     Socioeconomic History    Marital status:      Spouse name: Not on file    Number of children: Not on file    Years of education: Not on file    Highest education level: Not on file   Occupational History    Not on file   Tobacco Use    Smoking status: Never Smoker    Smokeless tobacco: Never Used   Vaping Use    Vaping Use: Never used   Substance and Sexual Activity    Alcohol use: No    Drug use: Never    Sexual activity: Not on file   Other Topics Concern    Not on file   Social History Narrative    Not on file     Social Determinants of Health     Financial Resource Strain:     Difficulty of Paying Living Expenses: Not on file   Food Insecurity:     Worried About Running Out of Food in the Last Year: Not on file    Marilee of Food in the Last Year: Not on file   Transportation Needs:     Lack of Transportation (Medical): Not on file    Lack of Transportation (Non-Medical): Not on file   Physical Activity:     Days of Exercise per Week: Not on file    Minutes of Exercise per Session: Not on file   Stress:     Feeling of Stress : Not on file   Social Connections:     Frequency of Communication with Friends and Family: Not on file    Frequency of Social Gatherings with Friends and Family: Not on file    Attends [employer] Services: Not on file    Active Member of [address] [place] [address] or Organizations: Not on file    Attends Club or Organization Meetings: Not on file    Marital Status: Not on file   Intimate Partner Violence:     Fear of Current or Ex-Partner: Not on file    Emotionally Abused: Not on file    Physically Abused: Not on file    Sexually Abused: Not on file   Housing Stability:     Unable to Pay for Housing in the Last Year: Not on file    Number of [place] in the Last Year: Not on file    Unstable Housing in the Last Year: Not on file       Review of Systems:     Skin: (-) rash,(-) psoriasis,(-) eczema, (-)skin cancer.    Musculoskeletal: (-) fractures,  (-) dislocations,(-) collagen vascular disease, (-) fibromyalgia, (-) multiple sclerosis, (-) muscular dystrophy, (-) RSD,(-) joint pain (-)swelling, (-) joint pain,swelling. Neurologic: (-) epilepsy, (-)seizures,(-) brain tumor,(-) TIA, (-)stroke, (-)headaches, (-)Parkinson disease,(-) memory loss, (-) LOC. Cardiovascular: (-) Chest pain, (-) swelling in legs/feet, (-) SOB, (-) cramping in legs/feet with walking. Subjective:    Constitutional:    The patient is alert and oriented x 3, appears to be stated age and in no distress. Temp 98 °F (36.7 °C) (Temporal)   Ht 5' 4\" (1.626 m)   Wt 240 lb 9.6 oz (109.1 kg)   BMI 41.30 kg/m²     Skin:  Upon inspection: the skin appears warm, dry and intact. There is a previous scar over the affected area. There is not any cellulitis, lymphedema or cutaneous lesions noted in the lower extremities. Upon palpation there is no induration noted. Neurologic:  Gait: antalgic; Motor exam of the lower extremities show ; quadriceps, hamstrings, foot dorsi and plantar flexors intact R.  5/5 and L. 5/5. Deep tendon reflexes are 2/4 at the knees and 2/4 at the ankles with strong extensor hallicus longus motor strength bilaterally. Sensory to both feet is intact to all sensory roots. Cardiovascular: The vascular exam is normal and is well perfused to distal extremities. Distal pulses DP/PT: R. 2+; L. 2+. There is cap refill noted less than two seconds in all digits. There is not edema of the bilateral lower extremities. There is not varicosities noted in the distal extremities. Lymph:  Upon palpation,  there is no lymphadenopathy noted in bilateral lower extremities. Musculoskeletal:    Lumbar exam:  On visual inspection, there is not deformity of the spine. full range of motion, no tenderness, palpable spasm or pain on motion. Special tests: Straight Leg Raise negative, Rubén testnegative. Hip exam:  Upon inspection, there is not deformity noted. Upon palpation there is not tenderness.   ROM: is   full and symmetrical.   Strength: Hip Flexors 5/5; Hip Abductors 5/5; Hip Adduction 5/5. Knee exam:  Left knee exam shows;  range of motion of R. Knee is 0 to 120, and L. Knee is 0 to 120. The patient does not have  pain on motion, effusion is mild, there is tenderness over the  medial, lateral, anterior region, there are not any masses, there is not ligamentous instability, there is not  deformity noted. Knee exam: neither positive for moderate crepitations, some mild tenderness laxity is not noted with  stress. R. Knee:  Lachman's negative, Anterior Drawer negative, Posterior Drawer negative  Tigre's negative, Thallasy  negative,   PF grind test negative, Apprehension test negative, Patellar J sign  negative  L. Knee:  Lachman's negative, Anterior Drawer negative, Posterior Drawer negative  Tigre's negative, Thallasy  negative,   PF grind test negative, Apprehension test negative,  Patellar J sign  Negative    Xrays: left TKA well aligned with no signs of aseptic loosening  Will obtain right knee xr today  Radiographic findings reviewed with patient    Impression:   Encounter Diagnoses   Name Primary?  S/P total knee arthroplasty, left Yes    Status post right knee replacement        Plan:   Will xr right knee today for 1 year visit  Hep  Activity as tolerated  Fu in 6 months with xr left knee only

## 2022-06-06 LAB — MAMMOGRAPHY, EXTERNAL: NORMAL

## 2022-09-29 ASSESSMENT — PROMIS GLOBAL HEALTH SCALE
IN THE PAST 7 DAYS, HOW OFTEN HAVE YOU BEEN BOTHERED BY EMOTIONAL PROBLEMS, SUCH AS FEELING ANXIOUS, DEPRESSED, OR IRRITABLE [ON A SCALE FROM 1 (NEVER) TO 5 (ALWAYS)]?: 2
SUM OF RESPONSES TO QUESTIONS 3, 6, 7, & 8: 14
IN GENERAL, HOW WOULD YOU RATE YOUR SATISFACTION WITH YOUR SOCIAL ACTIVITIES AND RELATIONSHIPS [ON A SCALE OF 1 (POOR) TO 5 (EXCELLENT)]?: 5
IN THE PAST 7 DAYS, HOW WOULD YOU RATE YOUR PAIN ON AVERAGE [ON A SCALE FROM 0 (NO PAIN) TO 10 (WORST IMAGINABLE PAIN)]?: 0
IN GENERAL, WOULD YOU SAY YOUR QUALITY OF LIFE IS...[ON A SCALE OF 1 (POOR) TO 5 (EXCELLENT)]: 5
SUM OF RESPONSES TO QUESTIONS 2, 4, 5, & 10: 17
IN GENERAL, HOW WOULD YOU RATE YOUR MENTAL HEALTH, INCLUDING YOUR MOOD AND YOUR ABILITY TO THINK [ON A SCALE OF 1 (POOR) TO 5 (EXCELLENT)]?: 5
IN GENERAL, WOULD YOU SAY YOUR HEALTH IS...[ON A SCALE OF 1 (POOR) TO 5 (EXCELLENT)]: 5
IN THE PAST 7 DAYS, HOW WOULD YOU RATE YOUR FATIGUE ON AVERAGE [ON A SCALE FROM 1 (NONE) TO 5 (VERY SEVERE)]?: 4
TO WHAT EXTENT ARE YOU ABLE TO CARRY OUT YOUR EVERYDAY PHYSICAL ACTIVITIES SUCH AS WALKING, CLIMBING STAIRS, CARRYING GROCERIES, OR MOVING A CHAIR [ON A SCALE OF 1 (NOT AT ALL) TO 5 (COMPLETELY)]?: 5
IN GENERAL, HOW WOULD YOU RATE YOUR PHYSICAL HEALTH [ON A SCALE OF 1 (POOR) TO 5 (EXCELLENT)]?: 5
HOW IS THE PROMIS V1.1 BEING ADMINISTERED?: 2
IN GENERAL, PLEASE RATE HOW WELL YOU CARRY OUT YOUR USUAL SOCIAL ACTIVITIES (INCLUDES ACTIVITIES AT HOME, AT WORK, AND IN YOUR COMMUNITY, AND RESPONSIBILITIES AS A PARENT, CHILD, SPOUSE, EMPLOYEE, FRIEND, ETC) [ON A SCALE OF 1 (POOR) TO 5 (EXCELLENT)]?: 5
WHO IS THE PERSON COMPLETING THE PROMIS V1.1 SURVEY?: 0

## 2022-09-29 ASSESSMENT — KOOS JR
HOW SEVERE IS YOUR KNEE STIFFNESS AFTER FIRST WAKING IN MORNING: 0
GOING UP OR DOWN STAIRS: 0
STANDING UPRIGHT: 0
KOOS JR TOTAL INTERVAL SCORE: 100
RISING FROM SITTING: 0
STRAIGHTENING KNEE FULLY: 0
TWISING OR PIVOTING ON KNEE: 0
BENDING TO THE FLOOR TO PICK UP OBJECT: 0

## 2022-11-30 DIAGNOSIS — Z96.652 S/P TOTAL KNEE ARTHROPLASTY, LEFT: Primary | ICD-10-CM

## 2022-12-05 ENCOUNTER — OFFICE VISIT (OUTPATIENT)
Dept: ORTHOPEDIC SURGERY | Age: 57
End: 2022-12-05
Payer: COMMERCIAL

## 2022-12-05 VITALS — BODY MASS INDEX: 39.99 KG/M2 | WEIGHT: 240 LBS | HEIGHT: 65 IN | TEMPERATURE: 98 F

## 2022-12-05 DIAGNOSIS — Z96.652 S/P TOTAL KNEE ARTHROPLASTY, LEFT: Primary | ICD-10-CM

## 2022-12-05 PROCEDURE — 99212 OFFICE O/P EST SF 10 MIN: CPT | Performed by: NURSE PRACTITIONER

## 2022-12-05 NOTE — PROGRESS NOTES
Chief Complaint   Patient presents with    Knee Pain     LEFT KNEE TKA F/U DOING GREAT DOS 12/1/2021. Keisha Villanueva returns today for follow-up of her left TKA. DOS: 12/1/2021.  She reports this is better than when I saw the patient last.      Past Medical History:   Diagnosis Date    Arthritis     Depression     HTN (hypertension)     Seasonal allergies      Past Surgical History:   Procedure Laterality Date    CARPAL TUNNEL RELEASE Left 3/8/2022    left wrist carpal tunnel release performed by Elizabeth Winter DO at [address] Right 5/26/2021    RIGHT KNEE TOTAL KNEE ARTHROPLASTY [facility] & NEPHEW) performed by Elizabeth Winter DO at [address] Left 12/1/2021    LEFT TOTAL KNEE  ARTHROPLASTY([facility] & NEPHEW ) performed by Elizabeth Winter DO at [address]       Current Outpatient Medications:     erythromycin base (E-MYCIN) 500 MG tablet, Take 2 tablets 1 hour prior to dental work, Disp: 2 tablet, Rfl: 0    atenolol (TENORMIN) 50 MG tablet, Take 50 mg by mouth at bedtime , Disp: , Rfl:     cetirizine (ZYRTEC) 10 MG tablet, Take 10 mg by mouth daily as needed , Disp: , Rfl:     KLOR-CON M20 20 MEQ extended release tablet, Take 20 mEq by mouth daily, Disp: , Rfl:     citalopram (CELEXA) 40 MG tablet, Take 40 mg by mouth nightly, Disp: , Rfl:   Allergies   Allergen Reactions    Pcn [Penicillins] Hives     Unsure  As a child    Ace Inhibitors Other (See Comments)     unknown    Adhesive Tape Dermatitis     Social History     Socioeconomic History    Marital status:      Spouse name: Not on file    Number of children: Not on file    Years of education: Not on file    Highest education level: Not on file   Occupational History    Not on file   Tobacco Use    Smoking status: Never    Smokeless tobacco: Never   Vaping Use    Vaping Use: Never used   Substance and Sexual Activity    Alcohol use: No    Drug use: Never    Sexual activity: Not on file   Other Topics Concern    Not on file   Social History Narrative    Not on file     Social Determinants of Health     Financial Resource Strain: Not on file   Food Insecurity: Not on file   Transportation Needs: Not on file   Physical Activity: Not on file   Stress: Not on file   Social Connections: Not on file   Intimate Partner Violence: Not on file   Housing Stability: Not on file       Review of Systems:     Skin: (-) rash,(-) psoriasis,(-) eczema, (-)skin cancer. Musculoskeletal: (-) fractures,  (-) dislocations,(-) collagen vascular disease, (-) fibromyalgia, (-) multiple sclerosis, (-) muscular dystrophy, (-) RSD,(-) joint pain (-)swelling, (-) joint pain,swelling. Neurologic: (-) epilepsy, (-)seizures,(-) brain tumor,(-) TIA, (-)stroke, (-)headaches, (-)Parkinson disease,(-) memory loss, (-) LOC. Cardiovascular: (-) Chest pain, (-) swelling in legs/feet, (-) SOB, (-) cramping in legs/feet with walking. Subjective:    Constitutional:    The patient is alert and oriented x 3, appears to be stated age and in no distress. Temp 98 °F (36.7 °C)   Ht 5' 5\" (1.651 m)   Wt 240 lb (108.9 kg)   BMI 39.94 kg/m²     Skin:  Upon inspection: the skin appears warm, dry and intact. There is a previous scar over the affected area. There is not any cellulitis, lymphedema or cutaneous lesions noted in the lower extremities. Upon palpation there is no induration noted. Neurologic:  Gait: antalgic; Motor exam of the lower extremities show ; quadriceps, hamstrings, foot dorsi and plantar flexors intact R.  5/5 and L. 5/5. Deep tendon reflexes are 2/4 at the knees and 2/4 at the ankles with strong extensor hallicus longus motor strength bilaterally. Sensory to both feet is intact to all sensory roots. Cardiovascular: The vascular exam is normal and is well perfused to distal extremities.   Distal pulses DP/PT: R. 2+; L. 2+. There is cap refill noted less than two seconds in all digits. There is not edema of the bilateral lower extremities. There is not varicosities noted in the distal extremities. Lymph:  Upon palpation,  there is no lymphadenopathy noted in bilateral lower extremities. Musculoskeletal:    Lumbar exam:  On visual inspection, there is not deformity of the spine. full range of motion, no tenderness, palpable spasm or pain on motion. Special tests: Straight Leg Raise negative, Rubén testnegative. Hip exam:  Upon inspection, there is not deformity noted. Upon palpation there is not tenderness. ROM: is   full and symmetrical.   Strength: Hip Flexors 5/5; Hip Abductors 5/5; Hip Adduction 5/5. Knee exam:  Left knee exam shows;  range of motion of R. Knee is 0 to 120, and L. Knee is 0 to 120. The patient does not have  pain on motion, effusion is mild, there is tenderness over the  medial, lateral, anterior region, there are not any masses, there is not ligamentous instability, there is not  deformity noted. Knee exam: neither positive for moderate crepitations, some mild tenderness laxity is not noted with  stress. R. Knee:  Lachman's negative, Anterior Drawer negative, Posterior Drawer negative  Tigre's negative, Thallasy  negative,   PF grind test negative, Apprehension test negative, Patellar J sign  negative  L. Knee:  Lachman's negative, Anterior Drawer negative, Posterior Drawer negative  Tigre's negative, Thallasy  negative,   PF grind test negative, Apprehension test negative,  Patellar J sign  Negative    Xrays: left TKA well aligned with no signs of aseptic loosening    Radiographic findings reviewed with patient    Impression:   Encounter Diagnosis   Name Primary?     S/P total knee arthroplasty, left Yes         Plan:    Hep  Activity as tolerated  Abx prior to dental work  Fu every 2 years with xr

## 2023-06-06 SDOH — HEALTH STABILITY: PHYSICAL HEALTH: ON AVERAGE, HOW MANY MINUTES DO YOU ENGAGE IN EXERCISE AT THIS LEVEL?: 80 MIN

## 2023-06-06 SDOH — HEALTH STABILITY: PHYSICAL HEALTH: ON AVERAGE, HOW MANY DAYS PER WEEK DO YOU ENGAGE IN MODERATE TO STRENUOUS EXERCISE (LIKE A BRISK WALK)?: 3 DAYS

## 2023-06-06 ASSESSMENT — SOCIAL DETERMINANTS OF HEALTH (SDOH)

## 2023-06-08 ENCOUNTER — OFFICE VISIT (OUTPATIENT)
Dept: FAMILY MEDICINE CLINIC | Age: 58
End: 2023-06-08
Payer: COMMERCIAL

## 2023-06-08 VITALS
WEIGHT: 255 LBS | HEART RATE: 61 BPM | HEIGHT: 64 IN | BODY MASS INDEX: 43.54 KG/M2 | SYSTOLIC BLOOD PRESSURE: 118 MMHG | TEMPERATURE: 97.5 F | DIASTOLIC BLOOD PRESSURE: 76 MMHG | RESPIRATION RATE: 16 BRPM | OXYGEN SATURATION: 97 %

## 2023-06-08 DIAGNOSIS — R73.9 HYPERGLYCEMIA: ICD-10-CM

## 2023-06-08 DIAGNOSIS — E78.5 HYPERLIPIDEMIA, UNSPECIFIED HYPERLIPIDEMIA TYPE: ICD-10-CM

## 2023-06-08 DIAGNOSIS — Z12.31 SCREENING MAMMOGRAM FOR BREAST CANCER: ICD-10-CM

## 2023-06-08 DIAGNOSIS — Z78.0 ENCOUNTER FOR OSTEOPOROSIS SCREENING IN ASYMPTOMATIC POSTMENOPAUSAL PATIENT: ICD-10-CM

## 2023-06-08 DIAGNOSIS — F41.1 GENERALIZED ANXIETY DISORDER: ICD-10-CM

## 2023-06-08 DIAGNOSIS — R20.2 TINGLING OF BOTH FEET: ICD-10-CM

## 2023-06-08 DIAGNOSIS — F32.5 MAJOR DEPRESSIVE DISORDER WITH SINGLE EPISODE, IN FULL REMISSION (HCC): ICD-10-CM

## 2023-06-08 DIAGNOSIS — M99.09 SOMATIC DYSFUNCTION OF BACK: ICD-10-CM

## 2023-06-08 DIAGNOSIS — Z13.820 ENCOUNTER FOR OSTEOPOROSIS SCREENING IN ASYMPTOMATIC POSTMENOPAUSAL PATIENT: ICD-10-CM

## 2023-06-08 DIAGNOSIS — Z76.89 ENCOUNTER TO ESTABLISH CARE: Primary | ICD-10-CM

## 2023-06-08 DIAGNOSIS — I10 PRIMARY HYPERTENSION: ICD-10-CM

## 2023-06-08 DIAGNOSIS — R00.2 INTERMITTENT PALPITATIONS: ICD-10-CM

## 2023-06-08 PROBLEM — F32.A DEPRESSIVE DISORDER: Status: ACTIVE | Noted: 2023-06-08

## 2023-06-08 LAB
ALBUMIN SERPL-MCNC: 4.3 G/DL (ref 3.5–5.2)
ALP SERPL-CCNC: 66 U/L (ref 35–104)
ALT SERPL-CCNC: 26 U/L (ref 0–32)
ANION GAP SERPL CALCULATED.3IONS-SCNC: 13 MMOL/L (ref 7–16)
AST SERPL-CCNC: 29 U/L (ref 0–31)
BASOPHILS # BLD: 0.06 E9/L (ref 0–0.2)
BASOPHILS NFR BLD: 0.8 % (ref 0–2)
BILIRUB SERPL-MCNC: 0.5 MG/DL (ref 0–1.2)
BUN SERPL-MCNC: 16 MG/DL (ref 6–20)
CALCIUM SERPL-MCNC: 10 MG/DL (ref 8.6–10.2)
CHLORIDE SERPL-SCNC: 102 MMOL/L (ref 98–107)
CHOLESTEROL, TOTAL: 191 MG/DL (ref 0–199)
CO2 SERPL-SCNC: 24 MMOL/L (ref 22–29)
CREAT SERPL-MCNC: 0.8 MG/DL (ref 0.5–1)
EOSINOPHIL # BLD: 0.24 E9/L (ref 0.05–0.5)
EOSINOPHIL NFR BLD: 3.1 % (ref 0–6)
ERYTHROCYTE [DISTWIDTH] IN BLOOD BY AUTOMATED COUNT: 13.6 FL (ref 11.5–15)
GLUCOSE SERPL-MCNC: 85 MG/DL (ref 74–99)
HBA1C MFR BLD: 6.3 % (ref 4–5.6)
HCT VFR BLD AUTO: 44 % (ref 34–48)
HDLC SERPL-MCNC: 60 MG/DL
HGB BLD-MCNC: 13.6 G/DL (ref 11.5–15.5)
IMM GRANULOCYTES # BLD: 0.02 E9/L
IMM GRANULOCYTES NFR BLD: 0.3 % (ref 0–5)
LDLC SERPL CALC-MCNC: 116 MG/DL (ref 0–99)
LYMPHOCYTES # BLD: 3.35 E9/L (ref 1.5–4)
LYMPHOCYTES NFR BLD: 43.1 % (ref 20–42)
MCH RBC QN AUTO: 30.2 PG (ref 26–35)
MCHC RBC AUTO-ENTMCNC: 30.9 % (ref 32–34.5)
MCV RBC AUTO: 97.6 FL (ref 80–99.9)
MONOCYTES # BLD: 0.54 E9/L (ref 0.1–0.95)
MONOCYTES NFR BLD: 6.9 % (ref 2–12)
NEUTROPHILS # BLD: 3.56 E9/L (ref 1.8–7.3)
NEUTS SEG NFR BLD: 45.8 % (ref 43–80)
PLATELET # BLD AUTO: 275 E9/L (ref 130–450)
PMV BLD AUTO: 10.6 FL (ref 7–12)
POTASSIUM SERPL-SCNC: 4.5 MMOL/L (ref 3.5–5)
PROT SERPL-MCNC: 8.4 G/DL (ref 6.4–8.3)
RBC # BLD AUTO: 4.51 E12/L (ref 3.5–5.5)
SODIUM SERPL-SCNC: 139 MMOL/L (ref 132–146)
T4 FREE SERPL-MCNC: 1.24 NG/DL (ref 0.93–1.7)
TRIGL SERPL-MCNC: 76 MG/DL (ref 0–149)
TSH SERPL-MCNC: 2.41 UIU/ML (ref 0.27–4.2)
VLDLC SERPL CALC-MCNC: 15 MG/DL
WBC # BLD: 7.8 E9/L (ref 4.5–11.5)

## 2023-06-08 PROCEDURE — 3074F SYST BP LT 130 MM HG: CPT | Performed by: NURSE PRACTITIONER

## 2023-06-08 PROCEDURE — 93000 ELECTROCARDIOGRAM COMPLETE: CPT | Performed by: NURSE PRACTITIONER

## 2023-06-08 PROCEDURE — 3078F DIAST BP <80 MM HG: CPT | Performed by: NURSE PRACTITIONER

## 2023-06-08 PROCEDURE — 99204 OFFICE O/P NEW MOD 45 MIN: CPT | Performed by: NURSE PRACTITIONER

## 2023-06-08 SDOH — ECONOMIC STABILITY: FOOD INSECURITY: WITHIN THE PAST 12 MONTHS, THE FOOD YOU BOUGHT JUST DIDN'T LAST AND YOU DIDN'T HAVE MONEY TO GET MORE.: NEVER TRUE

## 2023-06-08 SDOH — ECONOMIC STABILITY: HOUSING INSECURITY
IN THE LAST 12 MONTHS, WAS THERE A TIME WHEN YOU DID NOT HAVE A STEADY PLACE TO SLEEP OR SLEPT IN A SHELTER (INCLUDING NOW)?: NO

## 2023-06-08 SDOH — ECONOMIC STABILITY: FOOD INSECURITY: WITHIN THE PAST 12 MONTHS, YOU WORRIED THAT YOUR FOOD WOULD RUN OUT BEFORE YOU GOT MONEY TO BUY MORE.: NEVER TRUE

## 2023-06-08 SDOH — ECONOMIC STABILITY: INCOME INSECURITY: HOW HARD IS IT FOR YOU TO PAY FOR THE VERY BASICS LIKE FOOD, HOUSING, MEDICAL CARE, AND HEATING?: NOT HARD AT ALL

## 2023-06-08 ASSESSMENT — ENCOUNTER SYMPTOMS
FACIAL SWELLING: 0
SORE THROAT: 0
SINUS PRESSURE: 0
SINUS PAIN: 0
VOMITING: 0
NAUSEA: 0
RHINORRHEA: 0
VOICE CHANGE: 0
TROUBLE SWALLOWING: 0
DIARRHEA: 0
COLOR CHANGE: 0
SHORTNESS OF BREATH: 0
CONSTIPATION: 0
ABDOMINAL PAIN: 0
WHEEZING: 0
BACK PAIN: 0
COUGH: 0
CHEST TIGHTNESS: 0

## 2023-06-08 ASSESSMENT — ANXIETY QUESTIONNAIRES
IF YOU CHECKED OFF ANY PROBLEMS ON THIS QUESTIONNAIRE, HOW DIFFICULT HAVE THESE PROBLEMS MADE IT FOR YOU TO DO YOUR WORK, TAKE CARE OF THINGS AT HOME, OR GET ALONG WITH OTHER PEOPLE: SOMEWHAT DIFFICULT
4. TROUBLE RELAXING: 2
5. BEING SO RESTLESS THAT IT IS HARD TO SIT STILL: 1
7. FEELING AFRAID AS IF SOMETHING AWFUL MIGHT HAPPEN: 0
1. FEELING NERVOUS, ANXIOUS, OR ON EDGE: 2
GAD7 TOTAL SCORE: 11
6. BECOMING EASILY ANNOYED OR IRRITABLE: 2
3. WORRYING TOO MUCH ABOUT DIFFERENT THINGS: 2
2. NOT BEING ABLE TO STOP OR CONTROL WORRYING: 2

## 2023-06-08 ASSESSMENT — PATIENT HEALTH QUESTIONNAIRE - PHQ9
SUM OF ALL RESPONSES TO PHQ QUESTIONS 1-9: 0
SUM OF ALL RESPONSES TO PHQ9 QUESTIONS 1 & 2: 0
2. FEELING DOWN, DEPRESSED OR HOPELESS: 0
SUM OF ALL RESPONSES TO PHQ QUESTIONS 1-9: 0
1. LITTLE INTEREST OR PLEASURE IN DOING THINGS: 0
SUM OF ALL RESPONSES TO PHQ QUESTIONS 1-9: 0
SUM OF ALL RESPONSES TO PHQ QUESTIONS 1-9: 0

## 2023-06-08 NOTE — ASSESSMENT & PLAN NOTE
New problem well controlled currently on Celexa 40 mg nightly, fasting labs ordered today. Denies any suicidal or homicidal ideations.

## 2023-06-08 NOTE — ASSESSMENT & PLAN NOTE
New problem started about 2 - 3 weeks ago, discussed can have several etiologies discussed with patient noted on exam today she has somatic dysfunction and has been doing a lot of lifting, carrying and climbing the stairs clearing out her in-laws home. Suggest she see chiropractor referral Dr Emmy Brooke, fasting labs ordered and if persists will need further workup ? Imaging, ?  EMG

## 2023-06-08 NOTE — ASSESSMENT & PLAN NOTE
New problem lasts a few seconds resolves on its own. Discussed Holter monitor most likely will not tell us anything at this time so will monitor. She is asymptomatic at this time. EKG: sinus bradycardia. Interpreted by mean and signed. Continue the atenolol 50 mg nightly if worsens may need Holter monitor.

## 2023-06-08 NOTE — ASSESSMENT & PLAN NOTE
New problem well controlled, no significant side effects from medication noted. Fasting labs ordered today. Continue Atenolol 50 mg nightly. EKG: sinus bradycardia. Interpreted by mean and signed.     -Discussed taking medication as directed every day around the same time. Do not double up if skipped or missed doses. -Discussed exercising daily 30 minutes 5 times a week for 150 minutes weekly to help with stress reduction and weight reduction if needed.  -Discussed low sodium diet.  -Discussed limiting caffeine consumption and tobacco cessation and the effects they have on the heart and blood pressure.

## 2023-06-08 NOTE — ASSESSMENT & PLAN NOTE
New problem started about 2 - 3 weeks ago, discussed can have several etiologies discussed with patient noted on exam today she has somatic dysfunction and has been doing a lot of lifting, carrying and climbing the stairs clearing out her in-laws home. Suggest she see chiropractor referral Dr Fernandez Files, fasting labs ordered and if persists will need further workup ? Imaging, ?  EMG

## 2023-06-08 NOTE — PROGRESS NOTES
NEW PATIENT PROGRESS NOTE  [facility] [address]  [address] [address]  Dept: [phone]   Chief Complaint   Patient presents with    Establish Care     Former pcp [facility] Maintenance     Pap done with addison, due to tdap, lipids, Colonoscopy with [facility], mammo in June([address]), Due A1c, Hiv and hep C screen. Other     Having anxiety with fluttering feeling in chest.        ASSESSMENT/PLAN   1. Encounter to establish care  2. Primary hypertension  Assessment & Plan:  New problem well controlled, no significant side effects from medication noted. Fasting labs ordered today. Continue Atenolol 50 mg nightly. EKG: sinus bradycardia. Interpreted by mean and signed.     -Discussed taking medication as directed every day around the same time. Do not double up if skipped or missed doses. -Discussed exercising daily 30 minutes 5 times a week for 150 minutes weekly to help with stress reduction and weight reduction if needed.  -Discussed low sodium diet.  -Discussed limiting caffeine consumption and tobacco cessation and the effects they have on the heart and blood pressure. Orders:  -     CBC with Auto Differential; Future  -     Comprehensive Metabolic Panel; Future  3. Intermittent palpitations  Assessment & Plan:   New problem lasts a few seconds resolves on its own. Discussed Holter monitor most likely will not tell us anything at this time so will monitor. She is asymptomatic at this time. EKG: sinus bradycardia. Interpreted by mean and signed. Continue the atenolol 50 mg nightly if worsens may need Holter monitor. Orders:  -     EKG 12 Lead  4. Tingling of both feet  Assessment & Plan:   New problem started about 2 - 3 weeks ago, discussed can have several etiologies discussed with patient noted on exam today she has somatic dysfunction and has been doing a lot of lifting, carrying and climbing the stairs clearing out her in-laws home.

## 2023-06-12 LAB — THYROPEROXIDASE IGG SERPL-ACNC: <4 IU/ML (ref 0–25)

## 2023-06-20 ENCOUNTER — OFFICE VISIT (OUTPATIENT)
Dept: PHYSICAL MEDICINE AND REHAB | Age: 58
End: 2023-06-20
Payer: COMMERCIAL

## 2023-06-20 VITALS
SYSTOLIC BLOOD PRESSURE: 108 MMHG | WEIGHT: 250 LBS | HEART RATE: 62 BPM | BODY MASS INDEX: 42.68 KG/M2 | DIASTOLIC BLOOD PRESSURE: 70 MMHG | HEIGHT: 64 IN

## 2023-06-20 DIAGNOSIS — M79.604 LOW BACK PAIN RADIATING TO RIGHT LEG: Primary | ICD-10-CM

## 2023-06-20 DIAGNOSIS — M54.50 LOW BACK PAIN RADIATING TO RIGHT LEG: Primary | ICD-10-CM

## 2023-06-20 PROCEDURE — 3074F SYST BP LT 130 MM HG: CPT | Performed by: PHYSICAL MEDICINE & REHABILITATION

## 2023-06-20 PROCEDURE — 3078F DIAST BP <80 MM HG: CPT | Performed by: PHYSICAL MEDICINE & REHABILITATION

## 2023-06-20 PROCEDURE — 99214 OFFICE O/P EST MOD 30 MIN: CPT | Performed by: PHYSICAL MEDICINE & REHABILITATION

## 2023-06-20 NOTE — PROGRESS NOTES
Bessy Yan D.O. [facility]  [address]  Phone: [phone]  Fax: [phone]    Chief Complaint   Patient presents with    Back Pain     Established patient new problem Referred by Lori Darting       An independent historian was not needed. Interval history: Patient has new referral for low back pain that started about a month ago after working in the garden. She reports she has been bending at the waist a lot because she cannot kneel on her TKA's. She has associated paresthesias in the bilateral feet. Symptoms have been worsening. Today, the location of pain is low back pain radiating to the right leg and the severity is Pain Score:   2. The quality is stiff, numb/tingling. The frequency is episodic daily. Alleviating factors include: resting    Exacerbating factors include:  gardening, prolonged sitting    Functional status:   ADL: Self-care  Mobility: independence Device: None    Exercise: never    Red flags: Not present: history of cancer, pain awakening patient from sleep, night sweats, fevers, unintentional weight loss, immunospuression, saddle anesthesia, new bowel or bladder dysfunction, and osteoporosis. Associated symptoms: Not present: falls, subjective weakness, hematuria, nausea, vomiting or rash.  Present: paresthesias    Data reviewed/prior work up:   Review of external notes:   Referring provider office ntoe  Review of labs:   Lab Results   Component Value Date     06/08/2023    K 4.5 06/08/2023     06/08/2023    CO2 24 06/08/2023    BUN 16 06/08/2023    CREATININE 0.8 06/08/2023    GLUCOSE 85 06/08/2023    CALCIUM 10.0 06/08/2023    PROT 8.4 (H) 06/08/2023    LABALBU 4.3 06/08/2023    BILITOT 0.5 06/08/2023    ALKPHOS 66 06/08/2023    AST 29 06/08/2023    ALT 26 06/08/2023    LABGLOM >60 06/08/2023    GFRAA >60 11/29/2021     Past medical, surgical, family, social history,

## 2023-06-22 ENCOUNTER — TELEPHONE (OUTPATIENT)
Dept: PHYSICAL MEDICINE AND REHAB | Age: 58
End: 2023-06-22

## 2023-06-22 NOTE — TELEPHONE ENCOUNTER
----- Message from Woody Ribera DO sent at 6/22/2023  8:55 AM EDT -----  Reviewed test abnormal, inform patient that it showed degenerative changes.

## 2023-06-22 NOTE — TELEPHONE ENCOUNTER
Called and spoke with the patient and informed her of xray results. Patient is aware and voiced understanding.

## 2023-07-12 ENCOUNTER — PATIENT MESSAGE (OUTPATIENT)
Dept: PHYSICAL MEDICINE AND REHAB | Age: 58
End: 2023-07-12

## 2023-07-12 DIAGNOSIS — M54.50 LOW BACK PAIN RADIATING TO RIGHT LEG: Primary | ICD-10-CM

## 2023-07-12 DIAGNOSIS — M79.604 LOW BACK PAIN RADIATING TO RIGHT LEG: Primary | ICD-10-CM

## 2023-07-12 NOTE — TELEPHONE ENCOUNTER
From: Kay Brush  To: Dr. Ridge Rosales: 7/12/2023 8:15 AM EDT  Subject: Physical therapy     I was scheduled to [facility] at [facility] facility. My insurance does not cover it. Could you please fax a physical therapy order to [address] [place] [address] for me? The fax number is [phone]. This is for my back. Thank you.

## 2023-07-12 NOTE — TELEPHONE ENCOUNTER
Please see patients message patients insurance will not cover [address] PT here, I put in new order for external referral for physical therapy, please review and sign  thank you

## 2023-07-26 DIAGNOSIS — M25.532 BILATERAL WRIST PAIN: Primary | ICD-10-CM

## 2023-07-26 DIAGNOSIS — M25.531 BILATERAL WRIST PAIN: Primary | ICD-10-CM

## 2023-07-31 ENCOUNTER — OFFICE VISIT (OUTPATIENT)
Dept: ORTHOPEDIC SURGERY | Age: 58
End: 2023-07-31
Payer: COMMERCIAL

## 2023-07-31 VITALS — WEIGHT: 250 LBS | TEMPERATURE: 98 F | HEIGHT: 64 IN | BODY MASS INDEX: 42.68 KG/M2

## 2023-07-31 DIAGNOSIS — M77.8 WRIST TENDONITIS: Primary | ICD-10-CM

## 2023-07-31 PROCEDURE — 99213 OFFICE O/P EST LOW 20 MIN: CPT | Performed by: ORTHOPAEDIC SURGERY

## 2023-07-31 RX ORDER — METHYLPREDNISOLONE 4 MG/1
TABLET ORAL
Qty: 1 KIT | Refills: 2 | Status: SHIPPED | OUTPATIENT
Start: 2023-07-31 | End: 2023-08-06

## 2023-07-31 RX ORDER — CELECOXIB 200 MG/1
200 CAPSULE ORAL DAILY
Qty: 30 CAPSULE | Refills: 3 | Status: SHIPPED | OUTPATIENT
Start: 2023-07-31 | End: 2023-11-28

## 2023-07-31 NOTE — PROGRESS NOTES
Chief Complaint   Patient presents with    Wrist Pain     Bilateral Wrist, x 4 months, R>L, states of pain on the dorsal side of her wrist.  Pain with movement. Huyen Eng is a 56y.o. year old  female who presents for evaluation of bilateral wrist pain. she reports this started 4 months ago. she does not remember a specific injury that started the pain. The injury was repetitive injury. The pain is located mainly dorsal aspect of the wrist.  The pain is worse with activity and better with rest.  The patient has tried  rest, nsaids . The treatment has not been effective. The patient is right dominant. 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     Past Medical History:   Diagnosis Date    Allergic rhinitis     Anxiety October 2022    Arthritis     Depression     Headache     Hearing loss Hearing issues for half dozen years or more    HTN (hypertension)     Hyperlipidemia 8/14/2013    Obesity Always been overweight    Osteoarthritis In the last month all my joints hurt    Seasonal allergies     Sleep apnea Very loud snorer and do not feel rested in the morning    Urinary incontinence If I drink caffeine I have a problem going to the bathroom a lot and not much notice     Past Surgical History:   Procedure Laterality Date    CARPAL TUNNEL RELEASE Left 03/08/2022    left wrist carpal tunnel release performed by Claudy Villagomez DO at [address] [facility]  Left and right knee    12/1/21 and 5/26/21    TONSILLECTOMY      TOTAL KNEE ARTHROPLASTY Right 05/26/2021    RIGHT KNEE TOTAL KNEE ARTHROPLASTY [facility] & NEPHEW) performed by Claudy Villagomez DO at [address] [address] Left 12/01/2021    LEFT TOTAL KNEE  ARTHROPLASTY([facility]ER & NEPHEW ) performed by Claudy Villagomez DO at [facility]       Current Outpatient Medications:     erythromycin base (E-MYCIN) 500 MG tablet, Take 2 tablets 1 hour prior to

## 2023-08-01 ENCOUNTER — HOSPITAL ENCOUNTER (OUTPATIENT)
Dept: MAMMOGRAPHY | Age: 58
Discharge: HOME | End: 2023-08-03
Payer: COMMERCIAL

## 2023-08-01 VITALS — HEIGHT: 64 IN | WEIGHT: 250 LBS | BODY MASS INDEX: 42.68 KG/M2

## 2023-08-01 DIAGNOSIS — Z13.820 ENCOUNTER FOR OSTEOPOROSIS SCREENING IN ASYMPTOMATIC POSTMENOPAUSAL PATIENT: ICD-10-CM

## 2023-08-01 DIAGNOSIS — Z78.0 ENCOUNTER FOR OSTEOPOROSIS SCREENING IN ASYMPTOMATIC POSTMENOPAUSAL PATIENT: ICD-10-CM

## 2023-08-01 DIAGNOSIS — Z12.31 SCREENING MAMMOGRAM FOR BREAST CANCER: ICD-10-CM

## 2023-08-01 PROCEDURE — 77063 BREAST TOMOSYNTHESIS BI: CPT

## 2023-08-01 PROCEDURE — 77080 DXA BONE DENSITY AXIAL: CPT

## 2023-09-14 ENCOUNTER — OFFICE VISIT (OUTPATIENT)
Dept: FAMILY MEDICINE CLINIC | Age: 58
End: 2023-09-14
Payer: COMMERCIAL

## 2023-09-14 VITALS
DIASTOLIC BLOOD PRESSURE: 72 MMHG | TEMPERATURE: 97.1 F | SYSTOLIC BLOOD PRESSURE: 126 MMHG | BODY MASS INDEX: 42.34 KG/M2 | HEIGHT: 64 IN | RESPIRATION RATE: 16 BRPM | WEIGHT: 248 LBS | HEART RATE: 56 BPM | OXYGEN SATURATION: 97 %

## 2023-09-14 DIAGNOSIS — Z23 NEED FOR SHINGLES VACCINE: ICD-10-CM

## 2023-09-14 DIAGNOSIS — F32.5 MAJOR DEPRESSIVE DISORDER WITH SINGLE EPISODE, IN FULL REMISSION (HCC): Primary | ICD-10-CM

## 2023-09-14 DIAGNOSIS — G47.30 SLEEP-DISORDERED BREATHING: ICD-10-CM

## 2023-09-14 DIAGNOSIS — I10 PRIMARY HYPERTENSION: ICD-10-CM

## 2023-09-14 DIAGNOSIS — I83.811 VARICOSE VEINS OF RIGHT LOWER EXTREMITY WITH PAIN: ICD-10-CM

## 2023-09-14 PROCEDURE — 3074F SYST BP LT 130 MM HG: CPT | Performed by: NURSE PRACTITIONER

## 2023-09-14 PROCEDURE — 99214 OFFICE O/P EST MOD 30 MIN: CPT | Performed by: NURSE PRACTITIONER

## 2023-09-14 PROCEDURE — 3078F DIAST BP <80 MM HG: CPT | Performed by: NURSE PRACTITIONER

## 2023-09-14 RX ORDER — ZOSTER VACCINE RECOMBINANT, ADJUVANTED 50 MCG/0.5
0.5 KIT INTRAMUSCULAR SEE ADMIN INSTRUCTIONS
Qty: 0.5 ML | Refills: 1 | Status: SHIPPED | OUTPATIENT
Start: 2023-09-14 | End: 2024-03-12

## 2023-09-14 RX ORDER — ATENOLOL 50 MG/1
50 TABLET ORAL NIGHTLY
Qty: 90 TABLET | Refills: 1 | Status: SHIPPED | OUTPATIENT
Start: 2023-09-14

## 2023-09-14 RX ORDER — CITALOPRAM 40 MG/1
40 TABLET ORAL NIGHTLY
Qty: 90 TABLET | Refills: 1 | Status: SHIPPED | OUTPATIENT
Start: 2023-09-14

## 2023-09-14 ASSESSMENT — ENCOUNTER SYMPTOMS
COLOR CHANGE: 0
ABDOMINAL PAIN: 0
SINUS PRESSURE: 0
SINUS PAIN: 0
DIARRHEA: 0
COUGH: 0
NAUSEA: 0
CHEST TIGHTNESS: 0
VOMITING: 0
APNEA: 1
CONSTIPATION: 0
SHORTNESS OF BREATH: 0
FACIAL SWELLING: 0
SORE THROAT: 0
TROUBLE SWALLOWING: 0
WHEEZING: 0
VOICE CHANGE: 0
RHINORRHEA: 0
BACK PAIN: 0

## 2023-09-14 NOTE — ASSESSMENT & PLAN NOTE
well controlled, no significant side effects from medication noted. Continue atenolol 50 mg daily labs reviewed:CMP, CBCD, Lipids, TSH, FT4,   -Discussed taking medication as directed every day around the same time. Do not double up if skipped or missed doses. -Discussed exercising daily 30 minutes 5 times a week for 150 minutes weekly to help with stress reduction and weight reduction if needed.  -Discussed low sodium diet.  -Discussed limiting caffeine consumption and tobacco cessation and the effects they have on the heart and blood pressure.

## 2023-09-14 NOTE — PROGRESS NOTES
lesions  HEAD: normocephalic, atraumatic  ENT: tympanic membranes, external ear and ear canal normal bilaterally, normal hearing,   Throat: clear, tongue midline,  no drainage, no masses or lesions noted, good dentition  Neck: supple and non-tender without mass, trachea midline, no cervical lymphadenopathy, no bruit, no thyromegaly or nodules  Cardiovascular: regular rate and regular rhythm, normal S1 and S2,  no murmurs, rubs, clicks, or gallop. Distal pulses intact, no carotid bruits. No edema  Pulmonary/Chest: clear to auscultation bilaterally, no wheezes, rales or rhonchi, normal air movement, no respiratory distress  Abdomen: soft, non-tender, non-distended, normal bowel sounds, no masses or hepatosplenomegaly  Musculoskeletal: Normal ROM, no joint swelling, deformity or tenderness   Neurologic: gait, coordination and speech normal  Extremities: no clubbing, cyanosis, or edema. Psychiatric: Good eye contact, normal mood and affect, answers questions appropriately    I have reviewed my findings and recommendations with Jennifer Abraham.     Claudell Clunes, JERE - CNP, NP-C, FNP-BC

## 2023-10-04 ENCOUNTER — TELEPHONE (OUTPATIENT)
Dept: SLEEP CENTER | Age: 58
End: 2023-10-04

## 2023-11-13 ENCOUNTER — OFFICE VISIT (OUTPATIENT)
Dept: PHYSICAL MEDICINE AND REHAB | Age: 58
End: 2023-11-13
Payer: COMMERCIAL

## 2023-11-13 VITALS
BODY MASS INDEX: 42.57 KG/M2 | HEART RATE: 69 BPM | SYSTOLIC BLOOD PRESSURE: 126 MMHG | WEIGHT: 248.02 LBS | DIASTOLIC BLOOD PRESSURE: 70 MMHG

## 2023-11-13 DIAGNOSIS — M54.50 LOW BACK PAIN RADIATING TO RIGHT LEG: Primary | ICD-10-CM

## 2023-11-13 DIAGNOSIS — M79.604 LOW BACK PAIN RADIATING TO RIGHT LEG: Primary | ICD-10-CM

## 2023-11-13 PROCEDURE — 99214 OFFICE O/P EST MOD 30 MIN: CPT | Performed by: PHYSICAL MEDICINE & REHABILITATION

## 2023-11-13 PROCEDURE — 3078F DIAST BP <80 MM HG: CPT | Performed by: PHYSICAL MEDICINE & REHABILITATION

## 2023-11-13 PROCEDURE — 3074F SYST BP LT 130 MM HG: CPT | Performed by: PHYSICAL MEDICINE & REHABILITATION

## 2023-11-13 RX ORDER — CYCLOBENZAPRINE HCL 5 MG
5 TABLET ORAL NIGHTLY PRN
Qty: 30 TABLET | Refills: 0 | Status: SHIPPED | OUTPATIENT
Start: 2023-11-13 | End: 2023-12-13

## 2023-11-13 NOTE — PROGRESS NOTES
Angel Reese D.O. [facility]  [address], [address] [place] [address]  Phone: [phone]  Fax: [phone]    Chief Complaint   Patient presents with    Back Pain     3 month F/U        An independent historian was not needed. 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 Her  is employed at [employer]. She had completed about two months at that point. Symptoms have been worsening. Today, the location of pain is low back pain radiating to the right leg and the severity is Pain Score:   3. The quality is stiff, numb/tingling. The frequency is episodic daily. Alleviating factors include: resting    Exacerbating factors include:  gardening, prolonged sitting    Functional status:   ADL: Self-care  Mobility: independence Device: None    Exercise: never    Red flags: Not present: history of cancer, pain awakening patient from sleep, night sweats, fevers, unintentional weight loss, immunospuression, saddle anesthesia, new bowel or bladder dysfunction, and osteoporosis. Associated symptoms: Not present: falls, subjective weakness, hematuria, nausea, vomiting or rash. Present: paresthesias    Data reviewed/prior work up:   Review of external notes:   Referring provider office ntoe  Review of labs:   Lab Results   Component Value Date     06/08/2023    K 4.5 06/08/2023     06/08/2023    CO2 24 06/08/2023    BUN 16 06/08/2023    CREATININE 0.8 06/08/2023    GLUCOSE 85 06/08/2023    CALCIUM 10.0 06/08/2023    PROT 8.4 (H) 06/08/2023    LABALBU 4.3 06/08/2023    BILITOT 0.5 06/08/2023    ALKPHOS 66 06/08/2023    AST 29 06/08/2023    ALT 26 06/08/2023    LABGLOM >60 06/08/2023    GFRAA >60 11/29/2021     Past medical, surgical, family, social history, allergies and medications were otherwise reviewed in Epic.       Physical Exam:   Blood

## 2023-11-14 DIAGNOSIS — Z79.2 PROPHYLACTIC ANTIBIOTIC: Primary | ICD-10-CM

## 2023-11-14 DIAGNOSIS — Z96.652 S/P TOTAL KNEE ARTHROPLASTY, LEFT: Primary | ICD-10-CM

## 2023-11-14 RX ORDER — CEPHALEXIN 500 MG/1
2000 CAPSULE ORAL DAILY PRN
Qty: 4 CAPSULE | Refills: 3 | Status: SHIPPED | OUTPATIENT
Start: 2023-11-14

## 2023-11-14 RX ORDER — CELECOXIB 200 MG/1
200 CAPSULE ORAL DAILY
Qty: 90 CAPSULE | Refills: 3 | Status: SHIPPED | OUTPATIENT
Start: 2023-11-14 | End: 2024-11-08

## 2023-11-15 DIAGNOSIS — Z01.10 ENCOUNTER FOR HEARING EXAMINATION, UNSPECIFIED WHETHER ABNORMAL FINDINGS: Primary | ICD-10-CM

## 2023-11-20 ENCOUNTER — OFFICE VISIT (OUTPATIENT)
Dept: VASCULAR SURGERY | Age: 58
End: 2023-11-20
Payer: COMMERCIAL

## 2023-11-20 VITALS — BODY MASS INDEX: 42.91 KG/M2 | WEIGHT: 250 LBS

## 2023-11-20 DIAGNOSIS — G47.30 SLEEP-DISORDERED BREATHING: Primary | ICD-10-CM

## 2023-11-20 DIAGNOSIS — I83.813 VARICOSE VEINS OF BILATERAL LOWER EXTREMITIES WITH PAIN: Primary | ICD-10-CM

## 2023-11-20 DIAGNOSIS — M79.89 LEG SWELLING: Primary | ICD-10-CM

## 2023-11-20 PROCEDURE — 99202 OFFICE O/P NEW SF 15 MIN: CPT | Performed by: NURSE PRACTITIONER

## 2023-11-20 NOTE — PROGRESS NOTES
[]   Ears, nose, throat:             Hearing loss:    No [x]/Yes []      Vertigo:   No [x]/Yes []                       Swallowing problem:  No [x]/Yes []               Nose bleeds:   No [x]/Yes []      Voice hoarseness:  No [x]/Yes []  Respiratory:             Cough:   No [x]/Yes []      Pleuritic chest pain:  No [x]/Yes []                        Dyspnea:   No [x]/Yes []      Wheezing:   No [x]/Yes []  Cardiovascular:             Angina:   No [x]/Yes []      Palpitations:   No [x]/Yes []          Claudication:    No [x]/Yes []      Leg swelling:   No []/Yes [x]  Gastrointestinal:             Nausea or vomiting:  No [x]/Yes []               Abdominal pain:  No [x]/Yes []                     Intestinal bleeding: No [x]/Yes []  Musculoskeletal:             Leg pain:   No []/Yes [x]      Back pain:   No [x]/Yes []                    Weakness:   No [x]/Yes []  Neurologic:             Numbness:   No [x]/Yes []      Paralysis:   No [x]/Yes []                       Headaches:   No [x]/Yes []  Hematologic, lymphatic:   Anemia:   No [x]/Yes []              Bleeding or bruising:  No [x]/Yes []              Fevers or chills: No [x]/Yes []  Endocrine:             Temp intolerance:   No [x]/Yes []                       Polydipsia, polyuria:  No [x]/Yes []  Skin:              Rash:    No [x]/Yes []      Ulcers:   No [x]/Yes []              Abnorm pigment: No [x]/Yes []  :              Frequency/urgency:  No [x]/Yes []      Hematuria:    No [x]/Yes []                      Incontinence:    No [x]/Yes []    PHYSICAL EXAM:  There were no vitals filed for this visit.   General Appearance: alert and oriented to person, place and time, well developed and well- nourished, in no acute distress  Skin: warm and dry, no rash or erythema  Head: normocephalic and atraumatic  Eyes: extraocular eye movements intact, conjunctivae normal  ENT: external ear and ear canal normal bilaterally, nose without deformity  Pulmonary/Chest: clear to

## 2023-11-21 ENCOUNTER — TELEPHONE (OUTPATIENT)
Dept: VASCULAR SURGERY | Age: 58
End: 2023-11-21

## 2023-11-21 NOTE — TELEPHONE ENCOUNTER
Notified patient of ultrasound at [facility] on Friday, 12-22-23 at 8:00 am.  Sylva at 7:45 am.

## 2023-11-22 ENCOUNTER — TELEPHONE (OUTPATIENT)
Dept: SLEEP CENTER | Age: 58
End: 2023-11-22

## 2023-11-29 ENCOUNTER — TELEPHONE (OUTPATIENT)
Dept: PHYSICAL MEDICINE AND REHAB | Age: 58
End: 2023-11-29

## 2023-11-29 NOTE — TELEPHONE ENCOUNTER
----- Message from Carl Waters DO sent at 11/28/2023  5:11 PM EST -----  Test result is abnormal. Please schedule for follow up to discuss results and determine plan.

## 2023-11-30 ENCOUNTER — OFFICE VISIT (OUTPATIENT)
Dept: PODIATRY | Age: 58
End: 2023-11-30
Payer: COMMERCIAL

## 2023-11-30 VITALS — BODY MASS INDEX: 42.51 KG/M2 | HEIGHT: 64 IN | WEIGHT: 249 LBS

## 2023-11-30 DIAGNOSIS — R26.2 DIFFICULTY WALKING: ICD-10-CM

## 2023-11-30 DIAGNOSIS — R23.4 FISSURE IN SKIN OF FOOT: ICD-10-CM

## 2023-11-30 DIAGNOSIS — L30.9 DERMATITIS OF LEFT FOOT: Primary | ICD-10-CM

## 2023-11-30 DIAGNOSIS — M79.672 PAIN IN LEFT FOOT: ICD-10-CM

## 2023-11-30 PROCEDURE — 99203 OFFICE O/P NEW LOW 30 MIN: CPT | Performed by: PODIATRIST

## 2023-11-30 RX ORDER — TRIAMCINOLONE ACETONIDE 1 MG/G
CREAM TOPICAL
Qty: 90 G | Refills: 2 | Status: SHIPPED | OUTPATIENT
Start: 2023-11-30

## 2023-11-30 NOTE — PROGRESS NOTES
Patient is in today for evaluation of dryness and cracking to both feet. Patient thinks she also has a nail fungus issue to some toenails.  Pcp is JERE Mari - CNP  Last ov 9/14/23

## 2023-12-01 NOTE — PROGRESS NOTES
23     Mary Ellen Carpenter    : 1965 Sex: female   Age: 66 y.o. Patient was referred by: None  Patient's PCP/Provider is:  JERE Cornell CNP    Subjective:    Patient seen today for evaluation regarding chronic dryness and cracking to her digital regions bilateral foot    Chief Complaint   Patient presents with    Foot Pain     Bilateral foot        HPI: Patient has had issues for several years. Patient has noticed some recent dystrophy to the nail regions as well. Patient has tried multiple OTC treatment options without improvement noted. Patient is in no acute distress. She denies any nausea, vomiting, fever, chills. No other additional abnormalities noted. ROS:  Const: Positives and pertinent negatives as per HPI. Musculo: Denies symptoms other than stated above. Neuro: Denies symptoms other than stated above. Skin: Denies symptoms other than stated above. Current Medications:    Current Outpatient Medications:     triamcinolone (KENALOG) 0.1 % cream, Apply topically 2 times daily. , Disp: 90 g, Rfl: 2    cephALEXin (KEFLEX) 500 MG capsule, Take 4 capsules by mouth daily as needed (take 2 g, 1 hour prior to dental procedure), Disp: 4 capsule, Rfl: 3    cyclobenzaprine (FLEXERIL) 5 MG tablet, Take 1 tablet by mouth nightly as needed for Muscle spasms, Disp: 30 tablet, Rfl: 0    atenolol (TENORMIN) 50 MG tablet, Take 1 tablet by mouth at bedtime, Disp: 90 tablet, Rfl: 1    citalopram (CELEXA) 40 MG tablet, Take 1 tablet by mouth nightly, Disp: 90 tablet, Rfl: 1    zoster recombinant adjuvanted vaccine (SHINGRIX) 50 MCG/0.5ML SUSR injection, Inject 0.5 mLs into the muscle See Admin Instructions 1 dose now and repeat in 2-6 months, Disp: 0.5 mL, Rfl: 1    celecoxib (CELEBREX) 200 MG capsule, Take 1 capsule by mouth daily (Patient not taking: Reported on 2023), Disp: 90 capsule, Rfl: 3    erythromycin base (E-MYCIN) 500 MG tablet, Take 2 tablets 1 hour prior to dental work

## 2023-12-07 ENCOUNTER — HOSPITAL ENCOUNTER (OUTPATIENT)
Dept: SLEEP CENTER | Age: 58
Discharge: HOME | End: 2023-12-07
Payer: COMMERCIAL

## 2023-12-07 DIAGNOSIS — G47.30 SLEEP-DISORDERED BREATHING: ICD-10-CM

## 2023-12-07 PROCEDURE — 95800 SLP STDY UNATTENDED: CPT

## 2023-12-11 ENCOUNTER — OFFICE VISIT (OUTPATIENT)
Dept: ORTHOPEDIC SURGERY | Age: 58
End: 2023-12-11
Payer: COMMERCIAL

## 2023-12-11 VITALS — TEMPERATURE: 98 F | WEIGHT: 249 LBS | HEIGHT: 63 IN | BODY MASS INDEX: 44.12 KG/M2

## 2023-12-11 DIAGNOSIS — S66.811A RUPTURE OF EXTENSOR TENDONS OF RIGHT HAND AND WRIST, INITIAL ENCOUNTER: Primary | ICD-10-CM

## 2023-12-11 PROCEDURE — 99213 OFFICE O/P EST LOW 20 MIN: CPT | Performed by: ORTHOPAEDIC SURGERY

## 2023-12-11 NOTE — PROGRESS NOTES
Tinnells sign (-), Median nerve compression test (-),  Finklesteins (-), [facility] Grind test (-), [employer](-). Left:    Phallens sign(-), Tinnells sign (-), Median nerve compression test (-),  Finklesteins (-), [facility] Grind test (-), [employer](-). Xrays:   Left wrist-  Normal wrist radiographs    Right wrist-  Normal wrist radiographs    Radiographic findings reviewed with patient    Impression:   Encounter Diagnosis   Name Primary? Rupture of extensor tendons of right hand and wrist, initial encounter Yes         Plan: Natural history and expected course discussed. Questions answered. Educational materials distributed. Rest, ice, compression, and elevation (RICE) therapy. Reduction in offending activity discussed. MRI right wrist   I am concerned she tore her EPL. If her EPL is torn, I will refer her to Dr. Collin Henson. At least 30 minutes was spent discussing the diagnosis and treatment options with the patient with at least 50% of the time was spent with decision making and counseling the patient. I was present and performed the entire exam and or procedure.   I agree with the documentation that was recorded by my Physician Assistant Reji Lugo PA-C.

## 2023-12-12 ENCOUNTER — TELEPHONE (OUTPATIENT)
Dept: ORTHOPEDIC SURGERY | Age: 58
End: 2023-12-12

## 2023-12-12 NOTE — TELEPHONE ENCOUNTER
Pt called to get her MRI follow yup scheduled. MRI is 12/13/23. No availability at this time due to pt care.  She sated  had concern that she had a tendon rupture. Please contact pt.

## 2023-12-14 ENCOUNTER — TELEPHONE (OUTPATIENT)
Dept: ORTHOPEDIC SURGERY | Age: 58
End: 2023-12-14

## 2023-12-14 NOTE — TELEPHONE ENCOUNTER
Pt called to schedule her a follow up to her MRI of the wrist. She is scheduled in the f/a opening on Jan 23.  She feels that with her results, he may want to see her sooner.  Please advise?

## 2023-12-21 PROBLEM — F41.1 GENERALIZED ANXIETY DISORDER: Status: RESOLVED | Noted: 2023-06-08 | Resolved: 2023-12-21

## 2023-12-21 PROBLEM — F32.A DEPRESSIVE DISORDER: Status: RESOLVED | Noted: 2023-06-08 | Resolved: 2023-12-21

## 2023-12-21 PROBLEM — R00.2 INTERMITTENT PALPITATIONS: Status: RESOLVED | Noted: 2023-06-08 | Resolved: 2023-12-21

## 2023-12-21 PROBLEM — M54.10 BACK PAIN WITH RIGHT-SIDED RADICULOPATHY: Status: ACTIVE | Noted: 2023-12-21

## 2023-12-22 ENCOUNTER — HOSPITAL ENCOUNTER (OUTPATIENT)
Dept: INTERVENTIONAL RADIOLOGY/VASCULAR | Age: 58
Discharge: HOME | End: 2023-12-24
Payer: COMMERCIAL

## 2023-12-22 DIAGNOSIS — M79.89 LEG SWELLING: ICD-10-CM

## 2023-12-22 PROCEDURE — 93970 EXTREMITY STUDY: CPT

## 2023-12-27 ENCOUNTER — TELEPHONE (OUTPATIENT)
Dept: SLEEP MEDICINE | Age: 58
End: 2023-12-27

## 2023-12-27 NOTE — TELEPHONE ENCOUNTER
----- Message from Olayinka Bird DO sent at 12/26/2023  1:18 PM EST -----  Can you please make an appointment to review sleep study results?

## 2023-12-27 NOTE — TELEPHONE ENCOUNTER
Call to patient to schedule NP appt with any provider to discuss SS results. LVM to return our call.

## 2024-01-02 ENCOUNTER — OFFICE VISIT (OUTPATIENT)
Dept: SLEEP CENTER | Age: 59
End: 2024-01-02
Payer: COMMERCIAL

## 2024-01-02 VITALS
HEIGHT: 64 IN | OXYGEN SATURATION: 97 % | SYSTOLIC BLOOD PRESSURE: 115 MMHG | RESPIRATION RATE: 16 BRPM | HEART RATE: 66 BPM | DIASTOLIC BLOOD PRESSURE: 73 MMHG | BODY MASS INDEX: 44.11 KG/M2 | WEIGHT: 258.38 LBS

## 2024-01-02 DIAGNOSIS — G47.33 OBSTRUCTIVE SLEEP APNEA: Primary | ICD-10-CM

## 2024-01-02 DIAGNOSIS — E66.9 OBESITY, UNSPECIFIED CLASSIFICATION, UNSPECIFIED OBESITY TYPE, UNSPECIFIED WHETHER SERIOUS COMORBIDITY PRESENT: ICD-10-CM

## 2024-01-02 PROCEDURE — 3074F SYST BP LT 130 MM HG: CPT | Performed by: NURSE PRACTITIONER

## 2024-01-02 PROCEDURE — 3078F DIAST BP <80 MM HG: CPT | Performed by: NURSE PRACTITIONER

## 2024-01-02 PROCEDURE — 99204 OFFICE O/P NEW MOD 45 MIN: CPT | Performed by: NURSE PRACTITIONER

## 2024-01-02 ASSESSMENT — SLEEP AND FATIGUE QUESTIONNAIRES
HOW LIKELY ARE YOU TO NOD OFF OR FALL ASLEEP WHILE SITTING AND READING: 3
HOW LIKELY ARE YOU TO NOD OFF OR FALL ASLEEP WHILE WATCHING TV: 3
HOW LIKELY ARE YOU TO NOD OFF OR FALL ASLEEP WHILE SITTING INACTIVE IN A PUBLIC PLACE: 0
HOW LIKELY ARE YOU TO NOD OFF OR FALL ASLEEP WHILE SITTING AND TALKING TO SOMEONE: 0
HOW LIKELY ARE YOU TO NOD OFF OR FALL ASLEEP WHEN YOU ARE A PASSENGER IN A CAR FOR AN HOUR WITHOUT A BREAK: 3
HOW LIKELY ARE YOU TO NOD OFF OR FALL ASLEEP IN A CAR, WHILE STOPPED FOR A FEW MINUTES IN TRAFFIC: 0
HOW LIKELY ARE YOU TO NOD OFF OR FALL ASLEEP WHILE SITTING QUIETLY AFTER LUNCH WITHOUT ALCOHOL: 3
HOW LIKELY ARE YOU TO NOD OFF OR FALL ASLEEP WHILE LYING DOWN TO REST IN THE AFTERNOON WHEN CIRCUMSTANCES PERMIT: 3
ESS TOTAL SCORE: 15

## 2024-01-02 NOTE — PROGRESS NOTES
[facility]    Patient Name: Yelitza Paz  Age: 58 y.o.   : 1965    Date of Visit: 24      HPI   Yelitza Paz is a 58 y.o. female with  has a past medical history of Allergic rhinitis, Anxiety (2022), Arthritis, Back pain with right-sided radiculopathy (2023), Depression, Headache, Hearing loss (Hearing issues for half dozen years or more), HTN (hypertension), Hyperlipidemia (2013), Leg pain, Obesity (Always been overweight), Osteoarthritis (In the last month all my joints hurt), Seasonal allergies, Sleep apnea (Very loud snorer and do not feel rested in the morning), and Urinary incontinence (If I drink caffeine I have a problem going to the bathroom a lot and not much notice). She presents as a new patient with her , to Sleep Clinic, referred by Sandi Krueger CNP for Sleep Apnea   .    Patient presents to establish with sleep medicine for management of JUNG.  She recently had a home sleep study in December showing a severe degree of JUNG, AHI 46.  This was her first sleep study.  Ordered by PCP because of loud snoring, witnessed apneas, waking up feeling very tired, a.m. headaches, dry mouth, memory difficulties, and unintentional napping.  Denies drowsy driving.    Positive family history of sleep apnea.     states that she is a restless sleeper, sleeping mainly on her side and stomach.  She tries to avoid her back because of discomfort and chronic back pain.    She wakes up at least twice through the night and falls back asleep quickly.    Denies the use of sleeping aids.     Naps when she can through the day, usually she does feel rested upon wakening from nap because she naps sitting upright in a chair.  Naps last anywhere from 1 to 2 hours.  She can fall asleep unintentionally when watching TV.    Weight has been stable this past year.  She had lost about 30 pounds in  and has cut most of it off.  History of tonsillectomy.    Eager to

## 2024-01-08 ENCOUNTER — OFFICE VISIT (OUTPATIENT)
Dept: PHYSICAL MEDICINE AND REHAB | Age: 59
End: 2024-01-08
Payer: COMMERCIAL

## 2024-01-08 VITALS
DIASTOLIC BLOOD PRESSURE: 84 MMHG | HEART RATE: 65 BPM | HEIGHT: 64 IN | BODY MASS INDEX: 43.71 KG/M2 | WEIGHT: 256 LBS | SYSTOLIC BLOOD PRESSURE: 129 MMHG

## 2024-01-08 DIAGNOSIS — M47.816 FACET ARTHROPATHY, LUMBAR: Primary | ICD-10-CM

## 2024-01-08 PROCEDURE — 99214 OFFICE O/P EST MOD 30 MIN: CPT | Performed by: PHYSICAL MEDICINE & REHABILITATION

## 2024-01-08 PROCEDURE — 3074F SYST BP LT 130 MM HG: CPT | Performed by: PHYSICAL MEDICINE & REHABILITATION

## 2024-01-08 PROCEDURE — 3079F DIAST BP 80-89 MM HG: CPT | Performed by: PHYSICAL MEDICINE & REHABILITATION

## 2024-01-08 NOTE — PROGRESS NOTES
Valery Cespedes D.O.  [facility]  [address] [place] [address]  Phone: [phone]  Fax: [phone]    Chief Complaint   Patient presents with    Back Pain     F/U MRI results         An independent historian was not needed.    Interval history: Patient has no new complaints. Radicular leg pain has resolved with PT now has residual axial back pain.      Symptoms have been worsening.      Today, the location of pain is low back pain radiating to the right leg and the severity is Pain Score:   4.     The quality is stiff, numb/tingling.      The frequency is episodic daily.     Alleviating factors include: resting    Exacerbating factors include:  gardening, prolonged sitting    Functional status:   ADL: Self-care  Mobility: independence Device: None    Exercise: never    Red flags: Not present: history of cancer, pain awakening patient from sleep, night sweats, fevers, unintentional weight loss, immunospuression, saddle anesthesia, new bowel or bladder dysfunction, and osteoporosis.       Associated symptoms: Not present: falls, subjective weakness, hematuria, nausea, vomiting or rash. Present: paresthesias    Data reviewed/prior work up:   Review of external notes:   Referring provider office ntoe  Review of labs:   Lab Results   Component Value Date     06/08/2023    K 4.5 06/08/2023     06/08/2023    CO2 24 06/08/2023    BUN 16 06/08/2023    CREATININE 0.8 06/08/2023    GLUCOSE 85 06/08/2023    CALCIUM 10.0 06/08/2023    PROT 8.4 (H) 06/08/2023    LABALBU 4.3 06/08/2023    BILITOT 0.5 06/08/2023    ALKPHOS 66 06/08/2023    AST 29 06/08/2023    ALT 26 06/08/2023    LABGLOM >60 06/08/2023    GFRAA >60 11/29/2021   MRI lumbar reviewed with patient shows severe degenerative changes including diffuse disc bulging, facet arthropathy, and varying degrees of stenosis worst at central canal and bilateral foramen of L4-5 and L5-S1, disc osteophgyte L4-5,

## 2024-01-09 ENCOUNTER — TELEPHONE (OUTPATIENT)
Dept: PHYSICAL MEDICINE AND REHAB | Age: 59
End: 2024-01-09

## 2024-01-09 NOTE — TELEPHONE ENCOUNTER
Called and spoke with the patient and informed her that I received approval for bilateral MBB. Patient stated that she was driving and will call us back when she gets home.  Will wait for return call from patient.

## 2024-01-10 ENCOUNTER — PREP FOR PROCEDURE (OUTPATIENT)
Dept: PHYSICAL MEDICINE AND REHAB | Age: 59
End: 2024-01-10

## 2024-01-10 DIAGNOSIS — M47.816 FACET ARTHROPATHY, LUMBAR: ICD-10-CM

## 2024-01-10 NOTE — TELEPHONE ENCOUNTER
Patient called back and scheduled 1/18/24 for Bilateral MBB. All medications reviewed. Patient verbalized understanding.

## 2024-01-11 ENCOUNTER — TELEPHONE (OUTPATIENT)
Dept: VASCULAR SURGERY | Age: 59
End: 2024-01-11

## 2024-01-11 NOTE — TELEPHONE ENCOUNTER
Patient sent [employer] message for test results, please call her, thank you.    \"Have you been able to determine anything from the ultrasounds that I had on my varicose veins? \"

## 2024-01-12 ENCOUNTER — TELEPHONE (OUTPATIENT)
Dept: PHYSICAL MEDICINE AND REHAB | Age: 59
End: 2024-01-12

## 2024-01-12 NOTE — TELEPHONE ENCOUNTER
Called and spoke with the patient to ask if ok with her to switch her celexa to cymbalta.  Patient stated that she would like to try cymbalta.  Please advise.

## 2024-01-12 NOTE — TELEPHONE ENCOUNTER
Let patient know Sandi is ok with switching Celexa to Cymbalta. See if she wants to go forward.

## 2024-01-17 RX ORDER — DULOXETIN HYDROCHLORIDE 60 MG/1
60 CAPSULE, DELAYED RELEASE ORAL DAILY
Qty: 30 CAPSULE | Refills: 2 | Status: SHIPPED | OUTPATIENT
Start: 2024-01-17 | End: 2024-04-16

## 2024-01-17 RX ORDER — DULOXETIN HYDROCHLORIDE 20 MG/1
CAPSULE, DELAYED RELEASE ORAL
Qty: 9 CAPSULE | Refills: 0 | Status: SHIPPED | OUTPATIENT
Start: 2024-01-17 | End: 2024-01-23

## 2024-01-17 RX ORDER — SODIUM CHLORIDE 0.9 % (FLUSH) 0.9 %
5-40 SYRINGE (ML) INJECTION PRN
Status: CANCELLED | OUTPATIENT
Start: 2024-01-17

## 2024-01-17 RX ORDER — SODIUM CHLORIDE 0.9 % (FLUSH) 0.9 %
5-40 SYRINGE (ML) INJECTION EVERY 12 HOURS SCHEDULED
Status: CANCELLED | OUTPATIENT
Start: 2024-01-17

## 2024-01-17 RX ORDER — SODIUM CHLORIDE 9 MG/ML
INJECTION, SOLUTION INTRAVENOUS PRN
Status: CANCELLED | OUTPATIENT
Start: 2024-01-17

## 2024-01-18 ENCOUNTER — HOSPITAL ENCOUNTER (OUTPATIENT)
Age: 59
Setting detail: OUTPATIENT SURGERY
Discharge: HOME | End: 2024-01-18
Attending: PHYSICAL MEDICINE & REHABILITATION | Admitting: PHYSICAL MEDICINE & REHABILITATION
Payer: COMMERCIAL

## 2024-01-18 ENCOUNTER — HOSPITAL ENCOUNTER (OUTPATIENT)
Dept: OPERATING ROOM | Age: 59
Setting detail: OUTPATIENT SURGERY
Discharge: HOME | End: 2024-01-18
Attending: PHYSICAL MEDICINE & REHABILITATION
Payer: COMMERCIAL

## 2024-01-18 VITALS
RESPIRATION RATE: 16 BRPM | TEMPERATURE: 97.3 F | WEIGHT: 256 LBS | BODY MASS INDEX: 43.71 KG/M2 | HEIGHT: 64 IN | OXYGEN SATURATION: 95 % | SYSTOLIC BLOOD PRESSURE: 111 MMHG | DIASTOLIC BLOOD PRESSURE: 60 MMHG | HEART RATE: 67 BPM

## 2024-01-18 DIAGNOSIS — M47.816 OSTEOARTHRITIS OF LUMBAR SPINE, UNSPECIFIED SPINAL OSTEOARTHRITIS COMPLICATION STATUS: ICD-10-CM

## 2024-01-18 PROCEDURE — 7100000011 HC PHASE II RECOVERY - ADDTL 15 MIN: Performed by: PHYSICAL MEDICINE & REHABILITATION

## 2024-01-18 PROCEDURE — 64493 INJ PARAVERT F JNT L/S 1 LEV: CPT | Performed by: PHYSICAL MEDICINE & REHABILITATION

## 2024-01-18 PROCEDURE — 2500000003 HC RX 250 WO HCPCS: Performed by: PHYSICAL MEDICINE & REHABILITATION

## 2024-01-18 PROCEDURE — 2709999900 HC NON-CHARGEABLE SUPPLY: Performed by: PHYSICAL MEDICINE & REHABILITATION

## 2024-01-18 PROCEDURE — 7100000010 HC PHASE II RECOVERY - FIRST 15 MIN: Performed by: PHYSICAL MEDICINE & REHABILITATION

## 2024-01-18 PROCEDURE — 3600000005 HC SURGERY LEVEL 5 BASE: Performed by: PHYSICAL MEDICINE & REHABILITATION

## 2024-01-18 PROCEDURE — 6360000002 HC RX W HCPCS: Performed by: PHYSICAL MEDICINE & REHABILITATION

## 2024-01-18 PROCEDURE — 64494 INJ PARAVERT F JNT L/S 2 LEV: CPT | Performed by: PHYSICAL MEDICINE & REHABILITATION

## 2024-01-18 RX ORDER — LIDOCAINE HYDROCHLORIDE 10 MG/ML
INJECTION, SOLUTION EPIDURAL; INFILTRATION; INTRACAUDAL; PERINEURAL PRN
Status: DISCONTINUED | OUTPATIENT
Start: 2024-01-18 | End: 2024-01-18 | Stop reason: ALTCHOICE

## 2024-01-18 RX ORDER — SODIUM CHLORIDE 0.9 % (FLUSH) 0.9 %
5-40 SYRINGE (ML) INJECTION EVERY 12 HOURS SCHEDULED
Status: DISCONTINUED | OUTPATIENT
Start: 2024-01-18 | End: 2024-01-18 | Stop reason: HOSPADM

## 2024-01-18 RX ORDER — SODIUM CHLORIDE 0.9 % (FLUSH) 0.9 %
5-40 SYRINGE (ML) INJECTION PRN
Status: DISCONTINUED | OUTPATIENT
Start: 2024-01-18 | End: 2024-01-18 | Stop reason: HOSPADM

## 2024-01-18 RX ORDER — SODIUM CHLORIDE 9 MG/ML
INJECTION, SOLUTION INTRAVENOUS PRN
Status: DISCONTINUED | OUTPATIENT
Start: 2024-01-18 | End: 2024-01-18 | Stop reason: HOSPADM

## 2024-01-18 ASSESSMENT — PAIN DESCRIPTION - DESCRIPTORS: DESCRIPTORS: ACHING

## 2024-01-18 ASSESSMENT — PAIN - FUNCTIONAL ASSESSMENT
PAIN_FUNCTIONAL_ASSESSMENT: 0-10
PAIN_FUNCTIONAL_ASSESSMENT: 0-10

## 2024-01-18 NOTE — DISCHARGE INSTRUCTIONS
Post-op instruction Block  Dr. Marina  [phone]      Rest 12-24 hours following procedure. DO NOT DRIVE till the following day.    Keep dressing clean and dry. Do not bathe, shower, or sit in hot tub the day of procedure .You may remove the dressing following A.M.    You may return to work/school tomorrow.    Drink extra fluids for the next 24 hours.    Resume your regular diet.    Resume previously prescribed medications. Resume Coumadin, Plavix, NSAIDS, Ibuprofen products 24 hours after procedure.    You need to have a responsible adult stay with you this evening.    If you experience any discomfort relating to this procedure, you may take Tylenol 2 tablets every 4 hrs as needed for pain and/or apply ice to the affected area.    Please follow up with Dr. Marina or Dr. Cespedes. If you were a hip injection follow up with your orthopedic Doctor.    If you experience any unusual symptoms or problems, call your physician’s answering service at [phone] or go directly to [facility]’s Emergency Department.                   Infection After Surgery: Care Instructions  Overview  After surgery, an infection is always possible. It doesn't mean that the surgery didn't go well.  Because an infection can be serious, your doctor has taken steps to manage it.  Your doctor checked the infection and cleaned it if necessary. Your doctor may have made an opening in the area so that the pus can drain out. You may have gauze in the cut so that the area will stay open and keep draining. You may need antibiotics.  You will need to follow up with your doctor to make sure the infection has gone away.  Follow-up care is a key part of your treatment and safety. Be sure to make and go to all appointments, and call your doctor if you are having problems. It's also a good idea to know your test results and keep a list of the medicines you take.  How can you care for yourself at home?  Make sure your

## 2024-01-18 NOTE — OP NOTE
Bilateral L4-5, L5-S1.    Negative aspiration was noted prior to each injection.  The needles were removed intact and Band-Aids were applied.    Yelitza was transferred to the recovery area.  She was monitored, reassessed and eventually discharged after an appropriate observatory period.    No complications were noted.    Following the procedure Yelitza noted improvement of previous pain symptoms.    Plan: Yelitza will return to the office as scheduled.  She was encouraged to call with questions, concerns or if worsening of symptoms occurs.      Avelina Marina DO, FAAPMR   Board Certified Physical Medicine and Rehabilitation

## 2024-01-18 NOTE — H&P
Avelina Marina DO  [facility]  [address] [place] [address]  Phone: [phone]  Fax: [phone]    PCP: Sandi Krueger APRN - CNP  Date of visit: 1/18/2024    CC: low back pain       Yelitza Paz is a 58 y.o. female who presents today for medial branch blocks.  No red flag symptoms. Consents to proceed with procedure.     Allergies   Allergen Reactions    Pcn [Penicillins] Hives     Unsure  As a child    Ace Inhibitors Other (See Comments)     unknown       Current Facility-Administered Medications   Medication Dose Route Frequency Provider Last Rate Last Admin    sodium chloride flush 0.9 % injection 5-40 mL  5-40 mL IntraVENous 2 times per day Avelina Marina DO        sodium chloride flush 0.9 % injection 5-40 mL  5-40 mL IntraVENous PRN Avelina Marina DO        0.9 % sodium chloride infusion   IntraVENous PRN Avelina Marina DO           Past Medical History:   Diagnosis Date    Allergic rhinitis     Anxiety October 2022    Arthritis     Back pain with right-sided radiculopathy 12/21/2023    Back pain with right-sided radiculopathy 12/21/2023    Depression     Headache     Hearing loss Hearing issues for half dozen years or more    HTN (hypertension)     Hyperlipidemia 08/14/2013    Leg pain     Obesity Always been overweight    Osteoarthritis In the last month all my joints hurt    Seasonal allergies     Sleep apnea Very loud snorer and do not feel rested in the morning    Urinary incontinence If I drink caffeine I have a problem going to the bathroom a lot and not much notice       Past Surgical History:   Procedure Laterality Date    CARPAL TUNNEL RELEASE Left 03/08/2022    left wrist carpal tunnel release performed by Vincenzo Luke DO at [facility] OR    CHOLECYSTECTOMY      COLONOSCOPY      DILATION AND CURETTAGE OF UTERUS      JOINT REPLACEMENT  Left and right knee    12/1/21 and 5/26/21

## 2024-01-24 ENCOUNTER — TELEPHONE (OUTPATIENT)
Dept: VASCULAR SURGERY | Age: 59
End: 2024-01-24

## 2024-01-24 NOTE — TELEPHONE ENCOUNTER
I called the patient to discuss results of venous ultrasound. Negative DVT. + severe reflux of the bilateral greater saphenous veins, worse on the right. The patient states her symptoms are worse on the right. The patient continues to wear compression stockings as much as she can. I emphasized the importance of consistent use of the stockings. She would like to discuss surgical options regarding her veins. I will have her follow up with Dr. Mayorga to discuss.     Faustina Lopez, JERE - CNP

## 2024-02-01 ENCOUNTER — PREP FOR PROCEDURE (OUTPATIENT)
Dept: PHYSICAL MEDICINE AND REHAB | Age: 59
End: 2024-02-01

## 2024-02-01 ENCOUNTER — TELEPHONE (OUTPATIENT)
Dept: PHYSICAL MEDICINE AND REHAB | Age: 59
End: 2024-02-01

## 2024-02-01 ENCOUNTER — OFFICE VISIT (OUTPATIENT)
Dept: PHYSICAL MEDICINE AND REHAB | Age: 59
End: 2024-02-01
Payer: COMMERCIAL

## 2024-02-01 VITALS
HEART RATE: 64 BPM | BODY MASS INDEX: 43.87 KG/M2 | DIASTOLIC BLOOD PRESSURE: 73 MMHG | HEIGHT: 64 IN | WEIGHT: 257 LBS | SYSTOLIC BLOOD PRESSURE: 107 MMHG

## 2024-02-01 DIAGNOSIS — M47.816 FACET ARTHROPATHY, LUMBAR: Primary | ICD-10-CM

## 2024-02-01 PROCEDURE — 99214 OFFICE O/P EST MOD 30 MIN: CPT | Performed by: PHYSICAL MEDICINE & REHABILITATION

## 2024-02-01 PROCEDURE — 3078F DIAST BP <80 MM HG: CPT | Performed by: PHYSICAL MEDICINE & REHABILITATION

## 2024-02-01 PROCEDURE — 3074F SYST BP LT 130 MM HG: CPT | Performed by: PHYSICAL MEDICINE & REHABILITATION

## 2024-02-01 NOTE — PROGRESS NOTES
Valery Cespedes D.O.  [facility]  [address] [place] [address]  Phone: [phone]  Fax: [phone]    Chief Complaint   Patient presents with    Back Pain     F/u procedure       An independent historian was not needed.    Interval history: Patient has no new complaints. She had MBB and had 100% relief of her pain for about 6 hours after the procedure L4-5 and L5-S1.     Symptoms have been worsening.      Today, the location of pain is low back pain radiating to the right leg and the severity is Pain Score:   1.     The quality is stiff, numb/tingling.      The frequency is episodic daily.     Alleviating factors include: resting    Exacerbating factors include:  gardening, prolonged sitting    Functional status:   ADL: Self-care  Mobility: independence Device: None    Exercise: never    Red flags: Not present: history of cancer, pain awakening patient from sleep, night sweats, fevers, unintentional weight loss, immunospuression, saddle anesthesia, new bowel or bladder dysfunction, and osteoporosis.       Associated symptoms: Not present: falls, subjective weakness, hematuria, nausea, vomiting or rash. Present: paresthesias    Data reviewed: MBB procedure note.     Past medical, surgical, family, social history, allergies and medications were otherwise reviewed in Epic.      Physical Exam:   Blood pressure 107/73, pulse 64, height 1.626 m (5' 4\"), weight 116.6 kg (257 lb), not currently breastfeeding.   General: No apparent distress.   Habitus: Body mass index is 44.11 kg/m². Class III obesity (BMI = 40.0-49.9)   MSK: Lumbar paraspinal tenderness is present bilaterally worse on right. SLR is negative. AROM is full but pain increases in lumbar extension.   Neurologic:  No focal sensorimotor deficit.  Reflexes 2+ and symmetric. Gait is normal.      Impression:   1. Facet arthropathy, lumbar        The patient's condition is unstable and currently Acute on

## 2024-02-06 ENCOUNTER — PATIENT MESSAGE (OUTPATIENT)
Dept: FAMILY MEDICINE CLINIC | Age: 59
End: 2024-02-06

## 2024-02-07 RX ORDER — SODIUM CHLORIDE 0.9 % (FLUSH) 0.9 %
5-40 SYRINGE (ML) INJECTION EVERY 12 HOURS SCHEDULED
Status: CANCELLED | OUTPATIENT
Start: 2024-02-07

## 2024-02-07 RX ORDER — SODIUM CHLORIDE 0.9 % (FLUSH) 0.9 %
5-40 SYRINGE (ML) INJECTION PRN
Status: CANCELLED | OUTPATIENT
Start: 2024-02-07

## 2024-02-07 RX ORDER — SODIUM CHLORIDE 9 MG/ML
INJECTION, SOLUTION INTRAVENOUS PRN
Status: CANCELLED | OUTPATIENT
Start: 2024-02-07

## 2024-02-08 ENCOUNTER — HOSPITAL ENCOUNTER (OUTPATIENT)
Age: 59
Setting detail: OUTPATIENT SURGERY
Discharge: HOME | End: 2024-02-08
Attending: PHYSICAL MEDICINE & REHABILITATION | Admitting: PHYSICAL MEDICINE & REHABILITATION
Payer: COMMERCIAL

## 2024-02-08 ENCOUNTER — HOSPITAL ENCOUNTER (OUTPATIENT)
Dept: OPERATING ROOM | Age: 59
Setting detail: OUTPATIENT SURGERY
Discharge: HOME | End: 2024-02-08
Attending: PHYSICAL MEDICINE & REHABILITATION
Payer: COMMERCIAL

## 2024-02-08 VITALS
OXYGEN SATURATION: 96 % | RESPIRATION RATE: 16 BRPM | WEIGHT: 257 LBS | BODY MASS INDEX: 43.87 KG/M2 | SYSTOLIC BLOOD PRESSURE: 128 MMHG | HEIGHT: 64 IN | HEART RATE: 62 BPM | DIASTOLIC BLOOD PRESSURE: 68 MMHG

## 2024-02-08 DIAGNOSIS — M47.816 OSTEOARTHRITIS OF LUMBAR SPINE, UNSPECIFIED SPINAL OSTEOARTHRITIS COMPLICATION STATUS: ICD-10-CM

## 2024-02-08 PROCEDURE — 7100000010 HC PHASE II RECOVERY - FIRST 15 MIN: Performed by: PHYSICAL MEDICINE & REHABILITATION

## 2024-02-08 PROCEDURE — 3600000005 HC SURGERY LEVEL 5 BASE: Performed by: PHYSICAL MEDICINE & REHABILITATION

## 2024-02-08 PROCEDURE — 6360000002 HC RX W HCPCS: Performed by: PHYSICAL MEDICINE & REHABILITATION

## 2024-02-08 PROCEDURE — 2709999900 HC NON-CHARGEABLE SUPPLY: Performed by: PHYSICAL MEDICINE & REHABILITATION

## 2024-02-08 PROCEDURE — 7100000011 HC PHASE II RECOVERY - ADDTL 15 MIN: Performed by: PHYSICAL MEDICINE & REHABILITATION

## 2024-02-08 PROCEDURE — 2500000003 HC RX 250 WO HCPCS: Performed by: PHYSICAL MEDICINE & REHABILITATION

## 2024-02-08 RX ORDER — SODIUM CHLORIDE 0.9 % (FLUSH) 0.9 %
5-40 SYRINGE (ML) INJECTION EVERY 12 HOURS SCHEDULED
Status: DISCONTINUED | OUTPATIENT
Start: 2024-02-08 | End: 2024-02-08 | Stop reason: HOSPADM

## 2024-02-08 RX ORDER — SODIUM CHLORIDE 9 MG/ML
INJECTION, SOLUTION INTRAVENOUS PRN
Status: DISCONTINUED | OUTPATIENT
Start: 2024-02-08 | End: 2024-02-08 | Stop reason: HOSPADM

## 2024-02-08 RX ORDER — SODIUM CHLORIDE 0.9 % (FLUSH) 0.9 %
5-40 SYRINGE (ML) INJECTION PRN
Status: DISCONTINUED | OUTPATIENT
Start: 2024-02-08 | End: 2024-02-08 | Stop reason: HOSPADM

## 2024-02-08 RX ORDER — LIDOCAINE HYDROCHLORIDE 10 MG/ML
INJECTION, SOLUTION EPIDURAL; INFILTRATION; INTRACAUDAL; PERINEURAL PRN
Status: DISCONTINUED | OUTPATIENT
Start: 2024-02-08 | End: 2024-02-08 | Stop reason: ALTCHOICE

## 2024-02-08 ASSESSMENT — PAIN - FUNCTIONAL ASSESSMENT
PAIN_FUNCTIONAL_ASSESSMENT: 0-10
PAIN_FUNCTIONAL_ASSESSMENT: NONE - DENIES PAIN
PAIN_FUNCTIONAL_ASSESSMENT: NONE - DENIES PAIN

## 2024-02-08 ASSESSMENT — PAIN DESCRIPTION - DESCRIPTORS: DESCRIPTORS: ACHING

## 2024-02-08 NOTE — DISCHARGE INSTRUCTIONS
Post-op instruction Block  Dr. Marina  [phone]      Rest 12-24 hours following procedure. DO NOT DRIVE till the following day.    Keep dressing clean and dry. Do not bathe, shower, or sit in hot tub the day of procedure .You may remove the dressing following A.M.    You may return to work/school tomorrow.    Drink extra fluids for the next 24 hours.    Resume your regular diet.    Resume previously prescribed medications. Resume Coumadin, Plavix, NSAIDS, Ibuprofen products 24 hours after procedure.    You need to have a responsible adult stay with you this evening.    If you experience any discomfort relating to this procedure, you may take Tylenol 2 tablets every 4 hrs as needed for pain and/or apply ice to the affected area.    Please follow up with Dr. Marina or Dr. Cespedes. If you were a hip injection follow up with your orthopedic Doctor.    If you experience any unusual symptoms or problems, call your physician’s answering service at [phone] or go directly to [facility]’s Emergency Department.

## 2024-02-08 NOTE — OP NOTE
Yelitza Paz    2/8/2024     CHIEF COMPLAINT:  Low back pain.    PRE-OPERATIVE DIAGNOSIS:  Lumbar spondylosis, lumbar facet syndrome, lumbosacral osteoarthritis     POST-OPERATIVE DIAGNOSIS:  Lumbar spondylosis, lumbar facet syndrome, lumbosacral osteoarthritis     PROCEDURE:  # 2 Fluoroscopic guided lumbar medial branch blocks at  levels: medial branch level: L3, L4, L5 Joint: Bilateral L4-5, L5-S1.    SURGEON:    Avelina Marina, DO    ANESTHESIA:   Local.    ESTIMATED BLOOD LOSS:  None.  ______________________________________________________________________  HISTORY & PHYSICAL: See separate H&P.     PROCEDURE:  After confirming written and informed consent, Yelitza Paz was brought to the procedure room and placed in the prone position.  Blood pressure, heart rate, O2 saturation, and visual and verbal monitoring were established.   A time-out was performed and Yelitza’s name, date of birth, the procedure to be performed, as well as the plan for the location of the needle insertion were confirmed.      Fluoroscopy was utilized to delineate the anatomy of the lumbosacral spine. Surface landmarks were identified as well.  After prep and drape, an oblique fluoroscopic view was created to optimize visualization of the junction of the transverse process and the pedicle.    After infiltration of local, a #22-gauge, 3.5-inch regional block needle was passed to position the tip at the junction of the superior articular process and the transverse process, along the course of the medial branch.  Satisfactory needle placement was confirmed by AP and oblique projections.    At the sacral alar level, the sacral alar region was visualized and the needle tip was positioned on the sacral alar at the base of the superior articulating process where the medial branch traverses.      At each level the patient received 0.75 cc of 0.25% Marcaine.    The above procedure was performed at each of the following levels:

## 2024-02-08 NOTE — H&P
Avelina Marina DO  [facility]  [address] [place] [address]  Phone: [phone]  Fax: [phone]    PCP: Sandi Krueger APRN - CNP  Date of visit: 2/8/2024    CC: low back pain       Yelitza Paz is a 58 y.o. female who presents today for medial branch blocks.  No red flag symptoms. Consents to proceed with procedure.     Allergies   Allergen Reactions    Pcn [Penicillins] Hives     Unsure  As a child    Ace Inhibitors Other (See Comments)     unknown       Current Facility-Administered Medications   Medication Dose Route Frequency Provider Last Rate Last Admin    sodium chloride flush 0.9 % injection 5-40 mL  5-40 mL IntraVENous 2 times per day Avelina Marina DO        sodium chloride flush 0.9 % injection 5-40 mL  5-40 mL IntraVENous PRN Avelina Marina DO        0.9 % sodium chloride infusion   IntraVENous PRN Avelina Marina DO           Past Medical History:   Diagnosis Date    Allergic rhinitis     Anxiety October 2022    Arthritis     Back pain with right-sided radiculopathy 12/21/2023    Back pain with right-sided radiculopathy 12/21/2023    Depression     Headache     Hearing loss Hearing issues for half dozen years or more    HTN (hypertension)     Hyperlipidemia 08/14/2013    Leg pain     Obesity Always been overweight    Osteoarthritis In the last month all my joints hurt    Seasonal allergies     Sleep apnea Very loud snorer and do not feel rested in the morning    Urinary incontinence If I drink caffeine I have a problem going to the bathroom a lot and not much notice       Past Surgical History:   Procedure Laterality Date    CARPAL TUNNEL RELEASE Left 03/08/2022    left wrist carpal tunnel release performed by Vincenzo Luke DO at [facility] OR    CHOLECYSTECTOMY      COLONOSCOPY      DILATION AND CURETTAGE OF UTERUS      JOINT REPLACEMENT  Left and right knee    12/1/21 and 5/26/21

## 2024-02-12 ENCOUNTER — OFFICE VISIT (OUTPATIENT)
Dept: VASCULAR SURGERY | Age: 59
End: 2024-02-12
Payer: COMMERCIAL

## 2024-02-12 VITALS — WEIGHT: 255 LBS | BODY MASS INDEX: 43.77 KG/M2

## 2024-02-12 DIAGNOSIS — I83.813 VARICOSE VEINS OF BILATERAL LOWER EXTREMITIES WITH PAIN: Primary | ICD-10-CM

## 2024-02-12 PROCEDURE — 99214 OFFICE O/P EST MOD 30 MIN: CPT | Performed by: SURGERY

## 2024-02-12 NOTE — PROGRESS NOTES
Vascular Surgery Outpatient Followup    PCP : Sandi Krueger, APRN - CNP    HISTORY OF PRESENT ILLNESS:    This is a 58 y.o. female who presents in fu regarding sx bilateral LE varicose veins.      She has had worsening sxs since 2/2023.  She has tried to use prescription knee high and thigh high stockings which have not helped and often cause her more pain.  Her pain is worse on right and in medial calf, thigh and anterior thigh where there are bulky varicosities.  It is worse at the end of the day and worse with more activity.  Her pain is at its worst a 5/10.  It is achey, sore and intermittently sharp.        She is currently unemployed.      She denies hx of varicose veins in family, she denies personal hx of DVT.      Past Medical History:        Diagnosis Date    Allergic rhinitis     Anxiety October 2022    Arthritis     Back pain with right-sided radiculopathy 12/21/2023    Back pain with right-sided radiculopathy 12/21/2023    Depression     Headache     Hearing loss Hearing issues for half dozen years or more    HTN (hypertension)     Hyperlipidemia 08/14/2013    Leg pain     Obesity Always been overweight    Osteoarthritis In the last month all my joints hurt    Seasonal allergies     Sleep apnea Very loud snorer and do not feel rested in the morning    Urinary incontinence If I drink caffeine I have a problem going to the bathroom a lot and not much notice     Past Surgical History:        Procedure Laterality Date    CARPAL TUNNEL RELEASE Left 03/08/2022    left wrist carpal tunnel release performed by Vincenzo Luke DO at [facility] OR    CHOLECYSTECTOMY      COLONOSCOPY      DILATION AND CURETTAGE OF UTERUS      JOINT REPLACEMENT  Left and right knee    12/1/21 and 5/26/21    PAIN MANAGEMENT PROCEDURE Bilateral 1/18/2024    bilateral medial branch block to treat L4-5 and L5-S1 with fluoroscopic guidance performed by Avelina Marina DO at [facility] OR    PAIN MANAGEMENT PROCEDURE

## 2024-02-12 NOTE — TELEPHONE ENCOUNTER
From: Yelitza Paz  Sent: 2/8/2024 2:41 PM EST  To: Nalini Mcfadden Clinical Staff  Subject: Medical release     My surgery is with Dr. Branden Arce at [facility]. His number is [phone]. Thank you.

## 2024-02-15 ASSESSMENT — PATIENT HEALTH QUESTIONNAIRE - PHQ9
2. FEELING DOWN, DEPRESSED OR HOPELESS: SEVERAL DAYS
SUM OF ALL RESPONSES TO PHQ QUESTIONS 1-9: 5
9. THOUGHTS THAT YOU WOULD BE BETTER OFF DEAD, OR OF HURTING YOURSELF: NOT AT ALL
9. THOUGHTS THAT YOU WOULD BE BETTER OFF DEAD, OR OF HURTING YOURSELF: 0
3. TROUBLE FALLING OR STAYING ASLEEP: 0
SUM OF ALL RESPONSES TO PHQ QUESTIONS 1-9: 5
SUM OF ALL RESPONSES TO PHQ QUESTIONS 1-9: 5
2. FEELING DOWN, DEPRESSED OR HOPELESS: 1
7. TROUBLE CONCENTRATING ON THINGS, SUCH AS READING THE NEWSPAPER OR WATCHING TELEVISION: NOT AT ALL
4. FEELING TIRED OR HAVING LITTLE ENERGY: SEVERAL DAYS
5. POOR APPETITE OR OVEREATING: SEVERAL DAYS
1. LITTLE INTEREST OR PLEASURE IN DOING THINGS: 1
SUM OF ALL RESPONSES TO PHQ QUESTIONS 1-9: 5
5. POOR APPETITE OR OVEREATING: 1
1. LITTLE INTEREST OR PLEASURE IN DOING THINGS: SEVERAL DAYS
10. IF YOU CHECKED OFF ANY PROBLEMS, HOW DIFFICULT HAVE THESE PROBLEMS MADE IT FOR YOU TO DO YOUR WORK, TAKE CARE OF THINGS AT HOME, OR GET ALONG WITH OTHER PEOPLE: 0
7. TROUBLE CONCENTRATING ON THINGS, SUCH AS READING THE NEWSPAPER OR WATCHING TELEVISION: 0
4. FEELING TIRED OR HAVING LITTLE ENERGY: 1
SUM OF ALL RESPONSES TO PHQ9 QUESTIONS 1 & 2: 2
6. FEELING BAD ABOUT YOURSELF - OR THAT YOU ARE A FAILURE OR HAVE LET YOURSELF OR YOUR FAMILY DOWN: 1
8. MOVING OR SPEAKING SO SLOWLY THAT OTHER PEOPLE COULD HAVE NOTICED. OR THE OPPOSITE - BEING SO FIDGETY OR RESTLESS THAT YOU HAVE BEEN MOVING AROUND A LOT MORE THAN USUAL: NOT AT ALL
10. IF YOU CHECKED OFF ANY PROBLEMS, HOW DIFFICULT HAVE THESE PROBLEMS MADE IT FOR YOU TO DO YOUR WORK, TAKE CARE OF THINGS AT HOME, OR GET ALONG WITH OTHER PEOPLE: NOT DIFFICULT AT ALL
SUM OF ALL RESPONSES TO PHQ QUESTIONS 1-9: 5
8. MOVING OR SPEAKING SO SLOWLY THAT OTHER PEOPLE COULD HAVE NOTICED. OR THE OPPOSITE, BEING SO FIGETY OR RESTLESS THAT YOU HAVE BEEN MOVING AROUND A LOT MORE THAN USUAL: 0
6. FEELING BAD ABOUT YOURSELF - OR THAT YOU ARE A FAILURE OR HAVE LET YOURSELF OR YOUR FAMILY DOWN: SEVERAL DAYS
3. TROUBLE FALLING OR STAYING ASLEEP: NOT AT ALL

## 2024-02-19 ENCOUNTER — OFFICE VISIT (OUTPATIENT)
Dept: SLEEP CENTER | Age: 59
End: 2024-02-19
Payer: COMMERCIAL

## 2024-02-19 VITALS
WEIGHT: 263.89 LBS | DIASTOLIC BLOOD PRESSURE: 78 MMHG | RESPIRATION RATE: 16 BRPM | HEART RATE: 79 BPM | HEIGHT: 64 IN | BODY MASS INDEX: 45.05 KG/M2 | OXYGEN SATURATION: 93 % | SYSTOLIC BLOOD PRESSURE: 121 MMHG

## 2024-02-19 DIAGNOSIS — G47.33 OBSTRUCTIVE SLEEP APNEA: Primary | ICD-10-CM

## 2024-02-19 DIAGNOSIS — E66.9 OBESITY, UNSPECIFIED CLASSIFICATION, UNSPECIFIED OBESITY TYPE, UNSPECIFIED WHETHER SERIOUS COMORBIDITY PRESENT: ICD-10-CM

## 2024-02-19 PROCEDURE — 99214 OFFICE O/P EST MOD 30 MIN: CPT | Performed by: NURSE PRACTITIONER

## 2024-02-19 PROCEDURE — 3078F DIAST BP <80 MM HG: CPT | Performed by: NURSE PRACTITIONER

## 2024-02-19 PROCEDURE — 3074F SYST BP LT 130 MM HG: CPT | Performed by: NURSE PRACTITIONER

## 2024-02-19 ASSESSMENT — SLEEP AND FATIGUE QUESTIONNAIRES
HOW LIKELY ARE YOU TO NOD OFF OR FALL ASLEEP WHILE LYING DOWN TO REST IN THE AFTERNOON WHEN CIRCUMSTANCES PERMIT: 3
HOW LIKELY ARE YOU TO NOD OFF OR FALL ASLEEP WHILE SITTING AND READING: 1
HOW LIKELY ARE YOU TO NOD OFF OR FALL ASLEEP WHILE SITTING INACTIVE IN A PUBLIC PLACE: 0
HOW LIKELY ARE YOU TO NOD OFF OR FALL ASLEEP WHILE SITTING QUIETLY AFTER LUNCH WITHOUT ALCOHOL: 1
HOW LIKELY ARE YOU TO NOD OFF OR FALL ASLEEP WHILE WATCHING TV: 0
HOW LIKELY ARE YOU TO NOD OFF OR FALL ASLEEP WHILE SITTING AND TALKING TO SOMEONE: 0
ESS TOTAL SCORE: 8
HOW LIKELY ARE YOU TO NOD OFF OR FALL ASLEEP IN A CAR, WHILE STOPPED FOR A FEW MINUTES IN TRAFFIC: 0
HOW LIKELY ARE YOU TO NOD OFF OR FALL ASLEEP WHEN YOU ARE A PASSENGER IN A CAR FOR AN HOUR WITHOUT A BREAK: 3

## 2024-02-19 NOTE — PROGRESS NOTES
[facility]    Patient Name: Yelitza Paz  Age: 58 y.o.   : 1965    Date of Visit: 24        Review of Last Visit Summary:    The patient was last seen on 2024 for  Obstructive Sleep Apnea.    Interim History:     Yelitza Paz is a 58 y.o. female that  has a past medical history of Allergic rhinitis, Anxiety (2022), Arthritis, Back pain with right-sided radiculopathy (2023), Back pain with right-sided radiculopathy (2023), Depression, Headache, Hearing loss (Hearing issues for half dozen years or more), HTN (hypertension), Hyperlipidemia (2013), Leg pain, Obesity (Always been overweight), Osteoarthritis (In the last month all my joints hurt), Seasonal allergies, Sleep apnea (Very loud snorer and do not feel rested in the morning), and Urinary incontinence (If I drink caffeine I have a problem going to the bathroom a lot and not much notice). She presents in follow up to Sleep Clinic to review CPAP adherence and efficacy.     Interval Events:    -Started AUTO CPAP in  following HST showing severe JUNG, AHI of 46. She is continuing to adjust to CPAP, but does note significant benefit with use.  -She notes elimination of brain fog, less need for daytime napping, and less nocturnal awakenings. Previously waking up 3-4 times a night, now 0-1.  -Full face mask, sometimes needs adjusted through the night.  -Adjusted humidity level to 6 and finds improvement in dry mouth.  -At times has difficulty falling asleep with CPAP on.  -Air pressure comfortable.  -Cleaning device regularly.    DME:[facility]    Sleep Study History: 23- HST- wt 244 lbs- AHI 46, REM RDI 62, supine RDI 73, SpO2 chelle 80%, T<88% was 8.3% of total O2 recording time.    Sleep History:  Patient presents to sleep medicine for management of JUNG.  She recently had a home sleep study in December showing a severe degree of JUNG, AHI 46.  This was her first sleep study.  Ordered by PCP

## 2024-02-20 ENCOUNTER — TELEPHONE (OUTPATIENT)
Dept: PHYSICAL MEDICINE AND REHAB | Age: 59
End: 2024-02-20

## 2024-02-20 DIAGNOSIS — M47.816 FACET ARTHROPATHY, LUMBAR: Primary | ICD-10-CM

## 2024-02-20 NOTE — TELEPHONE ENCOUNTER
Called and spoke with the patient and informed her that the physician put the order in for RFA and once that gets approved I will call her to schedule.  Patient is aware and voiced understanding.

## 2024-02-20 NOTE — TELEPHONE ENCOUNTER
Patient called and had her second MBB   Medial Branch Block Pain Diary    Procedure Date: 2-8-24       Pre-procedure pain level: 6  Post-procedure pain level: 0    Using the 0-10 pain scale, (zero being no pain and 10 being extreme pain) rate your pain every 30 minutes for the next four hours while doing these activities.  Bending  1  Walking  0  Stretching  1  Lifting   1      For the next four days after your injection, please rate your pain level once per day using the 0-10 pain scale.   Day 2: 1   Day 3: 1   Day 4: 1   Day 5: 2    Which of the following words describe your current pain:  [x] Aching  [x] Dull  [] Constant  [] Numbing  [] Coldness  [] Burning [] Sharp  [] Stinging  [] Cramping  [] Radiating [] Stabbing  [] Tingling    Are you better: [x] Yes  [] No  If yes, what is your current percentage of pain relief:  [] 10%     [] 20%     [] 30%     [] 40%     [] 50%     [] 60%     [] 70%      [x] 80%     [] 90%     [] 100%

## 2024-02-21 ENCOUNTER — TELEPHONE (OUTPATIENT)
Dept: FAMILY MEDICINE CLINIC | Age: 59
End: 2024-02-21

## 2024-02-21 ENCOUNTER — OFFICE VISIT (OUTPATIENT)
Dept: FAMILY MEDICINE CLINIC | Age: 59
End: 2024-02-21
Payer: COMMERCIAL

## 2024-02-21 ENCOUNTER — TELEPHONE (OUTPATIENT)
Dept: PHYSICAL MEDICINE AND REHAB | Age: 59
End: 2024-02-21

## 2024-02-21 VITALS
RESPIRATION RATE: 16 BRPM | BODY MASS INDEX: 44.9 KG/M2 | DIASTOLIC BLOOD PRESSURE: 72 MMHG | OXYGEN SATURATION: 98 % | HEART RATE: 100 BPM | SYSTOLIC BLOOD PRESSURE: 124 MMHG | TEMPERATURE: 97.6 F | HEIGHT: 64 IN | WEIGHT: 263 LBS

## 2024-02-21 DIAGNOSIS — Z01.818 PRE-OPERATIVE EXAM: Primary | ICD-10-CM

## 2024-02-21 DIAGNOSIS — S66.911A TENDON RUPTURE OF WRIST, RIGHT, INITIAL ENCOUNTER: ICD-10-CM

## 2024-02-21 PROBLEM — Z23 NEED FOR SHINGLES VACCINE: Status: RESOLVED | Noted: 2023-09-14 | Resolved: 2024-02-21

## 2024-02-21 PROBLEM — G47.30 SLEEP APNEA: Status: ACTIVE | Noted: 2024-02-21

## 2024-02-21 PROBLEM — S66.919A TENDON RUPTURE OF WRIST: Status: ACTIVE | Noted: 2024-02-21

## 2024-02-21 PROCEDURE — 3074F SYST BP LT 130 MM HG: CPT | Performed by: NURSE PRACTITIONER

## 2024-02-21 PROCEDURE — 3078F DIAST BP <80 MM HG: CPT | Performed by: NURSE PRACTITIONER

## 2024-02-21 PROCEDURE — 99214 OFFICE O/P EST MOD 30 MIN: CPT | Performed by: NURSE PRACTITIONER

## 2024-02-21 ASSESSMENT — ENCOUNTER SYMPTOMS
BACK PAIN: 1
SINUS PRESSURE: 0
DIARRHEA: 0
CONSTIPATION: 0
COUGH: 0
ABDOMINAL PAIN: 0
CHEST TIGHTNESS: 0
TROUBLE SWALLOWING: 0
NAUSEA: 0
RHINORRHEA: 0
COLOR CHANGE: 0
VOICE CHANGE: 0
VOMITING: 0
SORE THROAT: 0
WHEEZING: 0
SHORTNESS OF BREATH: 0
SINUS PAIN: 0
FACIAL SWELLING: 0

## 2024-02-21 NOTE — TELEPHONE ENCOUNTER
Called the surgical hospital center at [facility] to see if they would take just Sandi's Signature for surgery scheduled in the morning,  they first stated that they needed a physicians to co-sign.  We were unable to get co-sign unless the patient was seen.  Per [facility] they asked me to fax office note and clearance paperwork with Sandi's signature and That Dr. Branden Arce would complete heart and lung check tomorrow before surgery.  I faxed paperwork and informed patient to follow the instructions given by [facility]. Patient was agreeable.

## 2024-02-21 NOTE — PROGRESS NOTES
Tonsillectomy; Colonoscopy; Total knee arthroplasty (Right, 05/26/2021); Total knee arthroplasty (Left, 12/01/2021); Carpal tunnel release (Left, 03/08/2022); joint replacement (Left and right knee); Pain management procedure (Bilateral, 1/18/2024); and Pain management procedure (Bilateral, 2/8/2024).  Social History:  reports that she has never smoked. She has never been exposed to tobacco smoke. She has never used smokeless tobacco. She reports that she does not drink alcohol and does not use drugs.  Family History: family history includes Allergy (Severe) in her mother; Arthritis in her brother; Breast Cancer (age of onset: 50) in her paternal cousin; Breast Cancer (age of onset: 70) in her paternal aunt; Depression in her father; Diabetes in her mother; Hearing Loss in her father; Heart Disease in her maternal grandmother; High Blood Pressure in her mother; High Cholesterol in her father; Hypertension in her mother; Psoriasis in her brother and mother; Vision Loss in her mother.  I have reviewed Yelitza's allergies, medications, problem list, medical, social and family history and have updated as needed in the electronic medical record”    Review of Systems   Constitutional:  Negative for activity change, appetite change, chills, diaphoresis, fatigue, fever and unexpected weight change.   HENT:  Negative for congestion, dental problem, drooling, ear discharge, ear pain, facial swelling, hearing loss, mouth sores, nosebleeds, postnasal drip, rhinorrhea, sinus pressure, sinus pain, sneezing, sore throat, tinnitus, trouble swallowing and voice change.    Eyes:  Negative for visual disturbance.   Respiratory:  Negative for cough, chest tightness, shortness of breath and wheezing.    Cardiovascular:  Negative for chest pain, palpitations and leg swelling.   Gastrointestinal:  Negative for abdominal pain, constipation, diarrhea, nausea and vomiting.   Endocrine: Negative for cold intolerance, heat intolerance,

## 2024-02-21 NOTE — ASSESSMENT & PLAN NOTE
Cleared for surgery from me.  Dr Park wants to see patient to clear for surgery, surgery will need to be cancelled Bre spoke to [facility] surgical [place] and Dr Arce would do clearance tomorrow.   labs reviewed from Dr Arce,  ESR, CMP, noted elevated CRP,  RENETTA IFA +, 1/320 she does have appt with Dr Zapata 4/2024 for follow up

## 2024-02-21 NOTE — ASSESSMENT & PLAN NOTE
Cleared for surgery from me.  Dr Park wants to see patient to clear for surgery, surgery will need to be cancelled Bre spoke to [facility] surgical [place] and Dr Arce would do clearance tomorrow. labs reviewed.  METS 4 swims 2 days a week for an hour and 2 water aerobics for an hour or more.

## 2024-02-21 NOTE — TELEPHONE ENCOUNTER
Called and left message on patient voicemail that I was calling to schedule RFA right the left 2 weeks later.  Will wait for return call from patient.

## 2024-02-21 NOTE — TELEPHONE ENCOUNTER
Called and spoke with Candi from [facility] to get prior authorization for CPT codes 30770, 64636 x2 with icd 10 code M47.816.  Per Candi no authorization is required for the cpt codes 70640 and 64636 x2 right then left 2 weeks later.  Reference # [id number].

## 2024-02-23 NOTE — TELEPHONE ENCOUNTER
Called and spoke with the patient and she is scheduled on 2-29-24 for right rfa and 3-14-24 for left rfa.  Patient was informed to not take any ibuprofen 24 hrs before procedure and the surgery center will call her the day before each appointment after 3 pm.  Patient is aware and voiced understanding.

## 2024-02-28 RX ORDER — SODIUM CHLORIDE 0.9 % (FLUSH) 0.9 %
5-40 SYRINGE (ML) INJECTION PRN
Status: CANCELLED | OUTPATIENT
Start: 2024-02-28

## 2024-02-28 RX ORDER — SODIUM CHLORIDE 9 MG/ML
INJECTION, SOLUTION INTRAVENOUS PRN
Status: CANCELLED | OUTPATIENT
Start: 2024-02-28

## 2024-02-28 RX ORDER — SODIUM CHLORIDE 0.9 % (FLUSH) 0.9 %
5-40 SYRINGE (ML) INJECTION EVERY 12 HOURS SCHEDULED
Status: CANCELLED | OUTPATIENT
Start: 2024-02-28

## 2024-02-29 ENCOUNTER — HOSPITAL ENCOUNTER (OUTPATIENT)
Dept: OPERATING ROOM | Age: 59
Setting detail: OUTPATIENT SURGERY
Discharge: HOME | End: 2024-02-29
Attending: PHYSICAL MEDICINE & REHABILITATION
Payer: COMMERCIAL

## 2024-02-29 ENCOUNTER — HOSPITAL ENCOUNTER (OUTPATIENT)
Age: 59
Setting detail: OUTPATIENT SURGERY
Discharge: HOME | End: 2024-02-29
Attending: PHYSICAL MEDICINE & REHABILITATION | Admitting: PHYSICAL MEDICINE & REHABILITATION
Payer: COMMERCIAL

## 2024-02-29 VITALS
HEART RATE: 61 BPM | SYSTOLIC BLOOD PRESSURE: 120 MMHG | DIASTOLIC BLOOD PRESSURE: 70 MMHG | HEIGHT: 64 IN | WEIGHT: 263 LBS | BODY MASS INDEX: 44.9 KG/M2 | RESPIRATION RATE: 16 BRPM | OXYGEN SATURATION: 98 %

## 2024-02-29 DIAGNOSIS — M47.896 OTHER OSTEOARTHRITIS OF SPINE, LUMBAR REGION: ICD-10-CM

## 2024-02-29 PROCEDURE — 2500000003 HC RX 250 WO HCPCS: Performed by: PHYSICAL MEDICINE & REHABILITATION

## 2024-02-29 PROCEDURE — 3600000005 HC SURGERY LEVEL 5 BASE: Performed by: PHYSICAL MEDICINE & REHABILITATION

## 2024-02-29 PROCEDURE — 2709999900 HC NON-CHARGEABLE SUPPLY: Performed by: PHYSICAL MEDICINE & REHABILITATION

## 2024-02-29 PROCEDURE — 7100000011 HC PHASE II RECOVERY - ADDTL 15 MIN: Performed by: PHYSICAL MEDICINE & REHABILITATION

## 2024-02-29 PROCEDURE — 64636 DESTROY L/S FACET JNT ADDL: CPT | Performed by: PHYSICAL MEDICINE & REHABILITATION

## 2024-02-29 PROCEDURE — 64635 DESTROY LUMB/SAC FACET JNT: CPT | Performed by: PHYSICAL MEDICINE & REHABILITATION

## 2024-02-29 PROCEDURE — 7100000010 HC PHASE II RECOVERY - FIRST 15 MIN: Performed by: PHYSICAL MEDICINE & REHABILITATION

## 2024-02-29 RX ORDER — SODIUM CHLORIDE 9 MG/ML
INJECTION, SOLUTION INTRAVENOUS PRN
Status: DISCONTINUED | OUTPATIENT
Start: 2024-02-29 | End: 2024-02-29 | Stop reason: HOSPADM

## 2024-02-29 RX ORDER — SODIUM CHLORIDE 0.9 % (FLUSH) 0.9 %
5-40 SYRINGE (ML) INJECTION PRN
Status: DISCONTINUED | OUTPATIENT
Start: 2024-02-29 | End: 2024-02-29 | Stop reason: HOSPADM

## 2024-02-29 RX ORDER — LIDOCAINE HYDROCHLORIDE 10 MG/ML
INJECTION, SOLUTION EPIDURAL; INFILTRATION; INTRACAUDAL; PERINEURAL PRN
Status: DISCONTINUED | OUTPATIENT
Start: 2024-02-29 | End: 2024-02-29 | Stop reason: ALTCHOICE

## 2024-02-29 RX ORDER — SODIUM CHLORIDE 0.9 % (FLUSH) 0.9 %
5-40 SYRINGE (ML) INJECTION EVERY 12 HOURS SCHEDULED
Status: DISCONTINUED | OUTPATIENT
Start: 2024-02-29 | End: 2024-02-29 | Stop reason: HOSPADM

## 2024-02-29 RX ORDER — LIDOCAINE HYDROCHLORIDE 20 MG/ML
INJECTION, SOLUTION EPIDURAL; INFILTRATION; INTRACAUDAL; PERINEURAL PRN
Status: DISCONTINUED | OUTPATIENT
Start: 2024-02-29 | End: 2024-02-29 | Stop reason: ALTCHOICE

## 2024-02-29 ASSESSMENT — PAIN - FUNCTIONAL ASSESSMENT: PAIN_FUNCTIONAL_ASSESSMENT: 0-10

## 2024-02-29 NOTE — OP NOTE
Yelitza Paz    2/29/2024      PRE-OPERATIVE DIAGNOSIS:  Lumbar spondylosis,  lumbar facet arthropathy, lumbosacral OA.    POST-OPERATIVE DIAGNOSIS:  Lumbar spondylosis, lumbar facet arthropathy, lumbosacral OA.    PROCEDURE:  Fluoroscopic-guided Right  lumbar medial branch neurolysis at  levels L4-5, L5-S1    SURGEON:    Avelina Marina DO    ANESTHESIA:   Local    ESTIMATED BLOOD LOSS:  None.  ______________________________________________________________________    HISTORY & PHYSICAL:   See separate H&P    PROCEDURE:  After confirming written and informed consent, Yelitza was brought to the procedure room and placed in the prone position. Blood pressure, heart rate, O2 saturation, and visual and verbal monitoring were established. A time-out was performed and her name and date of birth, the procedure to be performed as well as the plan for the location of the needle insertion were confirmed.       Fluoroscopy was utilized to delineate the anatomy of the lumbar spine. Surface landmarks were identified as well. After prep and drape, an oblique fluoroscopic view was created to optimize visualization of the junction of the transverse process and the pedicle.      After infiltration of local, # 20-gauge 100-mm 10- mm active tip radiofrequency ablation needle was passed to position the tip at the junction of the superior articular process and the transverse process, along the course of the medial branch. Satisfactory needle placement was confirmed by AP, lateral and oblique projections.     At the sacral ala level, the sacral alar region was visualized and the needle tip was positioned on the sacral ala at the base of the superior articulating process where the medial branch traverses. Satisfactory needle placement was confirmed by AP, lateral and oblique projections. Sensory and motor testing was then performed.  She  then received 0.75 cc of 1% PF xylocaine at each level.       Radiofrequency thermal

## 2024-02-29 NOTE — H&P
Avelina Marina DO  [facility] Physical Medicine and Rehabilitation  [address] [place] [address]  Phone: [phone]  Fax: [phone]    PCP: Sandi Krueger APRN - CNP  Date of visit: 2/29/2024    CC: low back pain       Yelitza Paz is a 58 y.o. female who presents today for lumbar radiofrequency ablation.  No red flag symptoms. Consents to proceed with procedure.     Allergies   Allergen Reactions    Pcn [Penicillins] Hives     Unsure  As a child    Ace Inhibitors Other (See Comments)     unknown       No current facility-administered medications for this encounter.       Past Medical History:   Diagnosis Date    Allergic rhinitis     Anxiety October 2022    Arthritis     Back pain with right-sided radiculopathy 12/21/2023    Back pain with right-sided radiculopathy 12/21/2023    Depression     Headache     Hearing loss Hearing issues for half dozen years or more    HTN (hypertension)     Hyperlipidemia 08/14/2013    Leg pain     Obesity Always been overweight    Osteoarthritis In the last month all my joints hurt    Seasonal allergies     Sleep apnea Very loud snorer and do not feel rested in the morning    Urinary incontinence If I drink caffeine I have a problem going to the bathroom a lot and not much notice       Past Surgical History:   Procedure Laterality Date    CARPAL TUNNEL RELEASE Left 03/08/2022    left wrist carpal tunnel release performed by Vincenzo Luke DO at [facility] OR    CHOLECYSTECTOMY      COLONOSCOPY      DILATION AND CURETTAGE OF UTERUS      JOINT REPLACEMENT  Left and right knee    12/1/21 and 5/26/21    PAIN MANAGEMENT PROCEDURE Bilateral 1/18/2024    bilateral medial branch block to treat L4-5 and L5-S1 with fluoroscopic guidance performed by Avelina Marina DO at [facility] OR    PAIN MANAGEMENT PROCEDURE Bilateral 2/8/2024    bilateral medial branch block with fluoroscopic guidance to treat facet joint L4-5 and L5-S1

## 2024-02-29 NOTE — DISCHARGE INSTRUCTIONS
Post-op instruction Radiofrequency  Dr. Marina    [phone]    You may apply an ice pack wrapped in a towel to the sore area. However, do not use ice longer than 10 minutes at time. You will probably have soreness at the site where the endoscope was inserted.    Keep the surgical site clean and dry at all times.    Remove the dressing in 24 hrs and apply a sterile Band-Aid.     Go home and rest. No driving. When resting, changing positions frequently may help reduce stiffness and soreness.     The following day you may resume normal activities providing you listen to you body. Your body will tell you how active or inactive to be. Remember the rule of thumb “If it hurts, don't do it.” Increase your activity level slowly.    Do not drive a motor vehicle, operate machinery or make important decisions for 24 hour if you had sedation.     Resume normal diet and medications. Resume any blood thinners 24 hours after procedure.    Call your physician if you experience any of the following symptoms: Fever, redness and or swelling at the site, nausea and vomiting, headache, persistent pain, numbness or tingling of legs and/or feet, and bowel or bladder problems.     You may experience increased pain during this time for up to 72 hours after the procedure. It can take up to 6-8 weeks for the procedure to be effective.    Follow up with Dr. Marina or Dr. Cespedes within 3 weeks of the procedure for a post-procedure check-up.

## 2024-03-07 ENCOUNTER — EVALUATION (OUTPATIENT)
Dept: OCCUPATIONAL THERAPY | Age: 59
End: 2024-03-07

## 2024-03-07 DIAGNOSIS — S66.919A STRAIN OF UNSPECIFIED MUSCLE, FASCIA AND TENDON AT WRIST AND HAND LEVEL, UNSPECIFIED HAND, INITIAL ENCOUNTER: Primary | ICD-10-CM

## 2024-03-07 NOTE — PROGRESS NOTES
OCCUPATIONAL THERAPY EVALUATION/Splint Application Only   [facility] PHYSICIANS [place] [facility] OCCUPATIONAL THERAPY   [address] [facility] [address] [place] [address]  Dept: [phone]  Loc: [phone]   [facility] OT Fax: [phone]     Date:  3/7/2024     Patient Name:  Yelitza Paz    :  1965    Restrictions/Precautions:  Thumb EPL transfer protocol, Low fall risk  Diagnosis:  S66.919A Strain of unspecified  muscle, fascia, tendon at the wrist and hand level, unspecified hand       Date of Surgery/Injury: R EPI tendon trransfer to EPL.    Insurance/Certification information:  [facility]  Plan of care signed (Y/N): N  Visit# / total visits: 1     Referring Practitioner:  Dr Branden Arce  Specific Practitioner Orders:PROM, AAROM, AROM, stretching, scar mgmt, strengthening and modalities as needed. Needs right thumb spica splint.      Past Medical History: Pt presents with a Hx of overdoing it end of  with spontaneous rupture of EPL.  L CTR, R TKR, HTN , Low back issue with ablation  Past Surgical History: R extensor proprius indicis tendon transfer to EPL (24)    Reason for Referral: thumb spica splint    Splint Fabricated/Provided: Temporary cast/ splint removed and light stockinette was donned for comfort. A volar wrist/ thumb splint was fabricated to place the thumb in extension and the wrist in neutral. Digits index to small finger exhibit full ROM while in the splint. Addl padding provided to the dorsal splint strap over the index MP. Pt tolerated splinting well.      Education/ Treatment Provided: Patient was provided with verbal education/handout regarding splint care, wear, precautions, and hygiene.     Home exercises initiated with attention to active flexion/ passive extension at the thumb IP, thumb MP and full thumb as tolerated. Pt was reminded to avoid straining during flexion and move only as tolerated. Will review and advance at next session for full OT

## 2024-03-08 ENCOUNTER — TELEPHONE (OUTPATIENT)
Dept: VASCULAR SURGERY | Age: 59
End: 2024-03-08

## 2024-03-08 ENCOUNTER — PREP FOR PROCEDURE (OUTPATIENT)
Dept: VASCULAR SURGERY | Age: 59
End: 2024-03-08

## 2024-03-08 DIAGNOSIS — I87.2 VENOUS INSUFFICIENCY: ICD-10-CM

## 2024-03-08 DIAGNOSIS — I83.891 SYMPTOMATIC VARICOSE VEINS OF RIGHT LOWER EXTREMITY: ICD-10-CM

## 2024-03-08 NOTE — TELEPHONE ENCOUNTER
Pt phoned, she spoke with her insurance company; radiofrequency ablation with stab phlebectomies is a covered benefit.  Scheduled (R) RFA + stabs with Dr. Mayorga at [facility] 5/9/24 at 7:00 a.m; post-op u/s and ov 5/13/24 at 1:00 pm.

## 2024-03-08 NOTE — TELEPHONE ENCOUNTER
No authorization is required for RFA and stab phlebectomies.   Spoke with patient, asked her to check with insurance to see if CPT codes 60465 and 02818 are covered benefits.

## 2024-03-12 ENCOUNTER — TREATMENT (OUTPATIENT)
Dept: OCCUPATIONAL THERAPY | Age: 59
End: 2024-03-12
Payer: COMMERCIAL

## 2024-03-12 DIAGNOSIS — S66.919A STRAIN OF UNSPECIFIED MUSCLE, FASCIA AND TENDON AT WRIST AND HAND LEVEL, UNSPECIFIED HAND, INITIAL ENCOUNTER: Primary | ICD-10-CM

## 2024-03-12 PROCEDURE — 97165 OT EVAL LOW COMPLEX 30 MIN: CPT | Performed by: OCCUPATIONAL THERAPIST

## 2024-03-12 PROCEDURE — 97530 THERAPEUTIC ACTIVITIES: CPT | Performed by: OCCUPATIONAL THERAPIST

## 2024-03-12 NOTE — PROGRESS NOTES
visits.   Certification period From: 3-13-24  To: 6-13-24     I have reviewed the Plan of Care established for skilled therapy services and certify that the services are required and that they will be provided while the patient is under my care.     Practitioner's Comments/Revisions:           Practitioner's Printed Name:  Dr Branden Arce                                   Practitioner's Signature:                                                               Date:      Please review Patient's OT evaluation and if you agree sign/date and fax back to us at our [facility] OT Fax: [phone]. Thank you for your referral!

## 2024-03-13 ENCOUNTER — TREATMENT (OUTPATIENT)
Dept: OCCUPATIONAL THERAPY | Age: 59
End: 2024-03-13
Payer: COMMERCIAL

## 2024-03-13 DIAGNOSIS — S66.919A STRAIN OF UNSPECIFIED MUSCLE, FASCIA AND TENDON AT WRIST AND HAND LEVEL, UNSPECIFIED HAND, INITIAL ENCOUNTER: Primary | ICD-10-CM

## 2024-03-13 PROCEDURE — 97110 THERAPEUTIC EXERCISES: CPT | Performed by: OCCUPATIONAL THERAPIST

## 2024-03-13 PROCEDURE — 97140 MANUAL THERAPY 1/> REGIONS: CPT | Performed by: OCCUPATIONAL THERAPIST

## 2024-03-13 RX ORDER — SODIUM CHLORIDE 0.9 % (FLUSH) 0.9 %
5-40 SYRINGE (ML) INJECTION PRN
Status: CANCELLED | OUTPATIENT
Start: 2024-03-13

## 2024-03-13 RX ORDER — SODIUM CHLORIDE 0.9 % (FLUSH) 0.9 %
5-40 SYRINGE (ML) INJECTION EVERY 12 HOURS SCHEDULED
Status: CANCELLED | OUTPATIENT
Start: 2024-03-13

## 2024-03-13 RX ORDER — SODIUM CHLORIDE 9 MG/ML
INJECTION, SOLUTION INTRAVENOUS PRN
Status: CANCELLED | OUTPATIENT
Start: 2024-03-13

## 2024-03-13 NOTE — PROGRESS NOTES
[] Neuromuscular Re-Ed            [] ADL/IADL re-training    [x] Therapeutic Activity                  [x] Pain Management with/without modalities PRN                 [x] Manual Therapy                      [x] Splinting                                   [x] Scar Management                   []Joint Protection/Training  []Ergonomics                             [] Joint Mobilization                      [] Adaptive Equipment Assessment/Training                             [x] Manual Edema Mobilization   [] Myofascial Release                 [] Energy Conservation/Work Simplification  [] GM/FM Coordination                [] Safety retraining/education per  individual diagnosis/goals  [] Desensitization        Patient Specific Goal: Use hand again                             GOALS (Long term same as Short term):  1) Patient will demonstrate good understanding of home program (exercises/activities/diagnosis/prognosis/goals) with good accuracy.   2) Patient will demonstrate increased active/passive range of motion of their R thumb to at least 3/4 radial extension/ full IP extension for ADL/IADL completion.  3) Patient will demonstrate  /pinch strength of at least 45# / 7# pinch pounds of their R hand for ease of opening tight containers and grasping grocery bags,.   4) Patient to report decreased pain in their affected R hand/ upper extremity from 2-7/10 to 2/10 or less with resistive daily functional use.   5) Increase in fine motor function as evidenced by < 25 sec time to complete 9-hole peg test with  the R hand in order to complete fasteners and write legibly.   6) Patient to report 100% compliance with their splint wear, care, and precautions if needed.   7) Patient will demonstrate a non-tender/non-adherent scar.   8) Patient will decrease QuickDASH score to 25% or less for increased participation in daily functional activities.         TODAY'S TREATMENT     Pain

## 2024-03-14 ENCOUNTER — HOSPITAL ENCOUNTER (OUTPATIENT)
Dept: OPERATING ROOM | Age: 59
Setting detail: OUTPATIENT SURGERY
Discharge: HOME | End: 2024-03-14
Attending: PHYSICAL MEDICINE & REHABILITATION
Payer: COMMERCIAL

## 2024-03-14 ENCOUNTER — HOSPITAL ENCOUNTER (OUTPATIENT)
Age: 59
Setting detail: OUTPATIENT SURGERY
Discharge: HOME | End: 2024-03-14
Attending: PHYSICAL MEDICINE & REHABILITATION | Admitting: PHYSICAL MEDICINE & REHABILITATION
Payer: COMMERCIAL

## 2024-03-14 VITALS
TEMPERATURE: 97.2 F | DIASTOLIC BLOOD PRESSURE: 72 MMHG | HEIGHT: 64 IN | WEIGHT: 263 LBS | SYSTOLIC BLOOD PRESSURE: 129 MMHG | BODY MASS INDEX: 44.9 KG/M2 | OXYGEN SATURATION: 99 % | RESPIRATION RATE: 18 BRPM | HEART RATE: 66 BPM

## 2024-03-14 DIAGNOSIS — M47.816 FACET ARTHROPATHY, LUMBAR: ICD-10-CM

## 2024-03-14 PROCEDURE — 7100000011 HC PHASE II RECOVERY - ADDTL 15 MIN: Performed by: PHYSICAL MEDICINE & REHABILITATION

## 2024-03-14 PROCEDURE — 3600000005 HC SURGERY LEVEL 5 BASE: Performed by: PHYSICAL MEDICINE & REHABILITATION

## 2024-03-14 PROCEDURE — 2709999900 HC NON-CHARGEABLE SUPPLY: Performed by: PHYSICAL MEDICINE & REHABILITATION

## 2024-03-14 PROCEDURE — 7100000010 HC PHASE II RECOVERY - FIRST 15 MIN: Performed by: PHYSICAL MEDICINE & REHABILITATION

## 2024-03-14 PROCEDURE — 64636 DESTROY L/S FACET JNT ADDL: CPT | Performed by: PHYSICAL MEDICINE & REHABILITATION

## 2024-03-14 PROCEDURE — 2500000003 HC RX 250 WO HCPCS: Performed by: PHYSICAL MEDICINE & REHABILITATION

## 2024-03-14 PROCEDURE — 64635 DESTROY LUMB/SAC FACET JNT: CPT | Performed by: PHYSICAL MEDICINE & REHABILITATION

## 2024-03-14 RX ORDER — LIDOCAINE HYDROCHLORIDE 10 MG/ML
INJECTION, SOLUTION EPIDURAL; INFILTRATION; INTRACAUDAL; PERINEURAL PRN
Status: DISCONTINUED | OUTPATIENT
Start: 2024-03-14 | End: 2024-03-14 | Stop reason: ALTCHOICE

## 2024-03-14 RX ORDER — SODIUM CHLORIDE 9 MG/ML
INJECTION, SOLUTION INTRAVENOUS PRN
Status: DISCONTINUED | OUTPATIENT
Start: 2024-03-14 | End: 2024-03-14 | Stop reason: HOSPADM

## 2024-03-14 RX ORDER — SODIUM CHLORIDE 0.9 % (FLUSH) 0.9 %
5-40 SYRINGE (ML) INJECTION PRN
Status: DISCONTINUED | OUTPATIENT
Start: 2024-03-14 | End: 2024-03-14 | Stop reason: HOSPADM

## 2024-03-14 RX ORDER — SODIUM CHLORIDE 0.9 % (FLUSH) 0.9 %
5-40 SYRINGE (ML) INJECTION EVERY 12 HOURS SCHEDULED
Status: DISCONTINUED | OUTPATIENT
Start: 2024-03-14 | End: 2024-03-14 | Stop reason: HOSPADM

## 2024-03-14 RX ORDER — LIDOCAINE HYDROCHLORIDE 20 MG/ML
INJECTION, SOLUTION EPIDURAL; INFILTRATION; INTRACAUDAL; PERINEURAL PRN
Status: DISCONTINUED | OUTPATIENT
Start: 2024-03-14 | End: 2024-03-14 | Stop reason: ALTCHOICE

## 2024-03-14 ASSESSMENT — PAIN - FUNCTIONAL ASSESSMENT
PAIN_FUNCTIONAL_ASSESSMENT: NONE - DENIES PAIN
PAIN_FUNCTIONAL_ASSESSMENT: 0-10
PAIN_FUNCTIONAL_ASSESSMENT: NONE - DENIES PAIN

## 2024-03-14 ASSESSMENT — PAIN DESCRIPTION - DESCRIPTORS: DESCRIPTORS: ACHING

## 2024-03-14 NOTE — DISCHARGE INSTRUCTIONS
surgeon knows about the infection, especially if you saw another doctor about your symptoms.  If your doctor prescribed antibiotics, take them as directed. Do not stop taking them just because you feel better. You need to take the full course of antibiotics.  Ask your doctor if you can take an over-the-counter pain medicine, such as acetaminophen (Tylenol), ibuprofen (Advil, Motrin), or naproxen (Aleve). Be safe with medicines. Read and follow all instructions on the label.  Do not take two or more pain medicines at the same time unless the doctor told you to. Many pain medicines have acetaminophen, which is Tylenol. Too much acetaminophen (Tylenol) can be harmful.  Prop up the area on a pillow anytime you sit or lie down during the next 3 days. Try to keep it above the level of your heart. This will help reduce swelling.  Keep the skin clean and dry.  You may have a bandage over the cut (incision). A bandage helps the incision heal and protects it. Your doctor will tell you how to take care of this. Keep it clean and dry. You may have drainage from the wound.  If your doctor told you how to care for your incision, follow your doctor's instructions. If you did not get instructions, follow this general advice:  Wash around the incision with clean water 2 times a day. Don't use hydrogen peroxide or alcohol, which can slow healing.  When should you call for help?   Call your doctor now or seek immediate medical care if:    You have signs that your infection is getting worse, such as:  Increased pain, swelling, warmth, or redness in the area.  Red streaks leading from the area.  Pus draining from the wound.  A new or higher fever.   Watch closely for changes in your health, and be sure to contact your doctor if you have any problems.  Where can you learn more?  Go to https://www.healthwise.net/patientEd and enter C340 to learn more about \"Infection After Surgery: Care Instructions.\"  Current as of: July 26, 2023

## 2024-03-14 NOTE — H&P
Avelina Marina DO  [facility] Physical Medicine and Rehabilitation  [address] [place] [address]  Phone: [phone]  Fax: [phone]    PCP: Sandi Krueger APRN - CNP  Date of visit: 3/14/2024    CC: low back pain       Yelitza Paz is a 58 y.o. female who presents today for lumbar radiofrequency ablation.  No red flag symptoms. Consents to proceed with procedure.     Allergies   Allergen Reactions    Pcn [Penicillins] Hives     Unsure  As a child    Ace Inhibitors Other (See Comments)     unknown       Current Facility-Administered Medications   Medication Dose Route Frequency Provider Last Rate Last Admin    sodium chloride flush 0.9 % injection 5-40 mL  5-40 mL IntraVENous 2 times per day Avelina Marina DO        sodium chloride flush 0.9 % injection 5-40 mL  5-40 mL IntraVENous PRN Avelina Marina DO        0.9 % sodium chloride infusion   IntraVENous PRN Avelina Marina DO           Past Medical History:   Diagnosis Date    Allergic rhinitis     Anxiety October 2022    Arthritis     Back pain with right-sided radiculopathy 12/21/2023    Back pain with right-sided radiculopathy 12/21/2023    Depression     Headache     Hearing loss Hearing issues for half dozen years or more    HTN (hypertension)     Hyperlipidemia 08/14/2013    Leg pain     Obesity Always been overweight    Osteoarthritis In the last month all my joints hurt    Seasonal allergies     Sleep apnea Very loud snorer and do not feel rested in the morning    Urinary incontinence If I drink caffeine I have a problem going to the bathroom a lot and not much notice       Past Surgical History:   Procedure Laterality Date    CARPAL TUNNEL RELEASE Left 03/08/2022    left wrist carpal tunnel release performed by Vincenzo Luke DO at [facility] OR    CHOLECYSTECTOMY      COLONOSCOPY      DILATION AND CURETTAGE OF UTERUS      JOINT REPLACEMENT  Left and right knee    12/1/21

## 2024-03-14 NOTE — OP NOTE
ablation was then performed at 80 degree Celsius for 90 seconds. Yelitza was comfortable throughout the ablation. No complications were noted.    In summary, medial branch neurolysis were performed at the following levels; left L4-5, L5-S1. Negative aspiration was noted prior to each injection. The needle was removed intact and dressings were applied.    Yelitza was transferred to the recovery area. She was monitored, reassessed and discharged after an appropriate observatory period. No complications were noted.    Yelitza will follow up in the office as scheduled. She was encouraged to call with questions, concerns or if worsening of symptoms occurs.      Avelina Marina DO, FAAPMR   Board Certified Physical Medicine and Rehabilitation

## 2024-03-20 ENCOUNTER — TREATMENT (OUTPATIENT)
Dept: OCCUPATIONAL THERAPY | Age: 59
End: 2024-03-20

## 2024-03-20 DIAGNOSIS — S66.919A STRAIN OF UNSPECIFIED MUSCLE, FASCIA AND TENDON AT WRIST AND HAND LEVEL, UNSPECIFIED HAND, INITIAL ENCOUNTER: Primary | ICD-10-CM

## 2024-03-20 NOTE — PROGRESS NOTES
OCCUPATIONAL THERAPY DAILY NOTE  [facility] PHYSICIANS [place] SPECIALTY CARE [facility] OCCUPATIONAL THERAPY   [place] [address]  Dept: [phone]  Loc: [phone]   [facility] OT Fax: [phone]      Date:  3/20/2024  Initial Evaluation Date: 3-12-24     Evaluating Therapist: Gabriela Nicholson OT    Patient Name:  Yelitza Paz    :  1965    Restrictions/Precautions:  Thumb EPL transfer protocol, Low fall risk  Diagnosis:  S66.919A Strain of unspecified  muscle, fascia, tendon at the wrist and hand level, unspecified hand                                                   Date of Surgery/Injury: R EPI tendon trransfer to EPL.(24)     Insurance/Certification information:  [facility]  Plan of care signed (Y/N): N  Visit# / total visits: Eval+ 3  / up to 16 visits     Referring Practitioner:  Dr Branden Arce  Specific Practitioner Orders:PROM, AAROM, AROM, stretching, scar mgmt, strengthening and modalities as needed.     Assessment of current deficits   [] Functional mobility             [x] ADLs           [x] Strength                  [] Cognition   [] Functional transfers           [x] IADLs          [] Safety Awareness  [] Endurance   [x] Fine Motor Coordination    [] Balance      [] Vision/perception    [] Sensation     [] Gross Motor Coordination [x] ROM           [x] Pain                        [x] Edema          [x] Scar Adhesion/Skin Integrity      OT PLAN OF CARE   OT POC based on physician orders, patient diagnosis and results of clinical assessment     Frequency/Duration: 1-2x/ week x 6-8 weeks     Specific OT Treatment to include:   [x] Instruction in HEP                   Modalities:  [x] Therapeutic Exercise                 [x] Ultrasound               [] Electrical Stimulation/Attended  [x] PROM/Stretching                    [x] Fluidotherapy          [x]  Paraffin                   [x] AAROM  [x] AROM                 [] Iontophoresis:   [x] Tendon Glides

## 2024-03-22 PROBLEM — Z01.818 PRE-OPERATIVE EXAM: Status: RESOLVED | Noted: 2024-02-21 | Resolved: 2024-03-22

## 2024-03-28 ENCOUNTER — TREATMENT (OUTPATIENT)
Dept: OCCUPATIONAL THERAPY | Age: 59
End: 2024-03-28

## 2024-03-28 DIAGNOSIS — S66.919A STRAIN OF UNSPECIFIED MUSCLE, FASCIA AND TENDON AT WRIST AND HAND LEVEL, UNSPECIFIED HAND, INITIAL ENCOUNTER: Primary | ICD-10-CM

## 2024-03-28 NOTE — PROGRESS NOTES
OCCUPATIONAL THERAPY DAILY NOTE  [facility] PHYSICIANS [place] SPECIALTY CARE [facility] OCCUPATIONAL THERAPY   [place] [address]  Dept: [phone]  Loc: [phone]   [facility] OT Fax: [phone]      Date:  3/28/2024  Initial Evaluation Date: 3-12-24     Evaluating Therapist: Gabriela Nicholson OT    Patient Name:  Yelitza Paz    :  1965    Restrictions/Precautions:  Thumb EPL transfer protocol, Low fall risk  Diagnosis:  S66.919A Strain of unspecified  muscle, fascia, tendon at the wrist and hand level, unspecified hand                                                   Date of Surgery/Injury: R EPI tendon trransfer to EPL.(24)     Insurance/Certification information:  [facility]  Plan of care signed (Y/N): N  Visit# / total visits: Eval+ 3  / up to 16 visits     Referring Practitioner:  Dr Branden Arce  Specific Practitioner Orders:PROM, AAROM, AROM, stretching, scar mgmt, strengthening and modalities as needed.     Assessment of current deficits   [] Functional mobility             [x] ADLs           [x] Strength                  [] Cognition   [] Functional transfers           [x] IADLs          [] Safety Awareness  [] Endurance   [x] Fine Motor Coordination    [] Balance      [] Vision/perception    [] Sensation     [] Gross Motor Coordination [x] ROM           [x] Pain                        [x] Edema          [x] Scar Adhesion/Skin Integrity      OT PLAN OF CARE   OT POC based on physician orders, patient diagnosis and results of clinical assessment     Frequency/Duration: 1-2x/ week x 6-8 weeks     Specific OT Treatment to include:   [x] Instruction in HEP                   Modalities:  [x] Therapeutic Exercise                 [x] Ultrasound               [] Electrical Stimulation/Attended  [x] PROM/Stretching                    [x] Fluidotherapy          [x]  Paraffin                   [x] AAROM  [x] AROM                 [] Iontophoresis:   [x] Tendon Glides

## 2024-04-05 ENCOUNTER — TREATMENT (OUTPATIENT)
Dept: OCCUPATIONAL THERAPY | Age: 59
End: 2024-04-05
Payer: COMMERCIAL

## 2024-04-05 DIAGNOSIS — S66.919A STRAIN OF UNSPECIFIED MUSCLE, FASCIA AND TENDON AT WRIST AND HAND LEVEL, UNSPECIFIED HAND, INITIAL ENCOUNTER: Primary | ICD-10-CM

## 2024-04-05 PROCEDURE — 97140 MANUAL THERAPY 1/> REGIONS: CPT | Performed by: OCCUPATIONAL THERAPIST

## 2024-04-05 PROCEDURE — 97530 THERAPEUTIC ACTIVITIES: CPT | Performed by: OCCUPATIONAL THERAPIST

## 2024-04-05 PROCEDURE — 97022 WHIRLPOOL THERAPY: CPT | Performed by: OCCUPATIONAL THERAPIST

## 2024-04-05 PROCEDURE — 97110 THERAPEUTIC EXERCISES: CPT | Performed by: OCCUPATIONAL THERAPIST

## 2024-04-05 NOTE — PROGRESS NOTES
OCCUPATIONAL THERAPY DAILY NOTE  [facility] PHYSICIANS [place] SPECIALTY CARE [facility] OCCUPATIONAL THERAPY   [place] [address]  Dept: [phone]  Loc: [phone]   [facility] OT Fax: [phone]      Date:  2024  Initial Evaluation Date: 3-12-24     Evaluating Therapist: Gabriela Nicholson OT    Patient Name:  Yelitza Paz    :  1965    Restrictions/Precautions:  Thumb EPL transfer protocol, Low fall risk  Diagnosis:  S66.919A Strain of unspecified  muscle, fascia, tendon at the wrist and hand level, unspecified hand                                                   Date of Surgery/Injury: R EPI tendon trransfer to EPL.(24)     Insurance/Certification information:  [facility]  Plan of care signed (Y/N): N  Visit# / total visits: Eval+ 4  / up to 16 visits     Referring Practitioner:  Dr Branden Arce  Specific Practitioner Orders:PROM, AAROM, AROM, stretching, scar mgmt, strengthening and modalities as needed.     Assessment of current deficits   [] Functional mobility             [x] ADLs           [x] Strength                  [] Cognition   [] Functional transfers           [x] IADLs          [] Safety Awareness  [] Endurance   [x] Fine Motor Coordination    [] Balance      [] Vision/perception    [] Sensation     [] Gross Motor Coordination [x] ROM           [x] Pain                        [x] Edema          [x] Scar Adhesion/Skin Integrity      OT PLAN OF CARE   OT POC based on physician orders, patient diagnosis and results of clinical assessment     Frequency/Duration: 1-2x/ week x 6-8 weeks     Specific OT Treatment to include:   [x] Instruction in HEP                   Modalities:  [x] Therapeutic Exercise                 [x] Ultrasound               [] Electrical Stimulation/Attended  [x] PROM/Stretching                    [x] Fluidotherapy          [x]  Paraffin                   [x] AAROM  [x] AROM                 [] Iontophoresis:   [x] Tendon Glides

## 2024-04-10 ENCOUNTER — TREATMENT (OUTPATIENT)
Dept: OCCUPATIONAL THERAPY | Age: 59
End: 2024-04-10
Payer: COMMERCIAL

## 2024-04-10 DIAGNOSIS — S66.919A STRAIN OF UNSPECIFIED MUSCLE, FASCIA AND TENDON AT WRIST AND HAND LEVEL, UNSPECIFIED HAND, INITIAL ENCOUNTER: Primary | ICD-10-CM

## 2024-04-10 PROCEDURE — 97018 PARAFFIN BATH THERAPY: CPT | Performed by: OCCUPATIONAL THERAPIST

## 2024-04-10 PROCEDURE — 97110 THERAPEUTIC EXERCISES: CPT | Performed by: OCCUPATIONAL THERAPIST

## 2024-04-10 PROCEDURE — 97140 MANUAL THERAPY 1/> REGIONS: CPT | Performed by: OCCUPATIONAL THERAPIST

## 2024-04-10 PROCEDURE — 97530 THERAPEUTIC ACTIVITIES: CPT | Performed by: OCCUPATIONAL THERAPIST

## 2024-04-10 NOTE — PROGRESS NOTES
modification D/c - splint cut back to permit ^ wrist ROM.           Assessment/Comments:Status is updated today to show good improvements in all areas. Continued OT POC remains appropriate. Will transition into progressive strengthening with continued RO thumb/ wrist to further enhance hand use.     Thumb AROM  4-10-24    IP 50*      MP 45*    Opp  intact    Radial extension  55*  (L- 75*)    Palmar ABD  45*  (L- 53*)       Wrist AROM Flexion- 35*  Extension- 58*       -Rehab Potential: Good   -Patient Response to Treatment: Pt is happy with her progress today.,     Patient. Education:  [x] Plans/Goals, Risks/Benefits discussed  [x] Home exercise program  Method of Education: [x] Verbal  [x] Demo  [] Written  Comprehension of Education:  [x] Verbalizes understanding.  [x] Demonstrates understanding.  [] Needs Review.  [] Demonstrates/verbalizes understanding of HEP/Ed previously given.      Time In:1300       Time Out: 1355    CODE  Minutes  Units   61820 Fluidotherapy     25085 Paraffin 10 1   25722 Ultrasound     46795 Electrical Stim - Attended     54857 Iontophoresis     02056 Therapeutic Ex 15 1   05808 Therapeutic Activity 20 1   87219 Neuromuscular Re-Ed     18909 Manual Therapy 10 1   88887 ADL/COMP Tech Train     52640 Orthotic Management/Training      Other: MH                  Total  55 4       Plan: OT 1-2x/week for up to 16 sessions    [x]  Continue Plan of care with focus on scar mgmt, edema control, ROM and progressive functional / strengthening activity. : Treatment covered based on POC and graduated to patient's progress. Pt education continues at each visit to obtain maximum benefits from skilled OT intervention.  []  Alter Plan of care:   []  Discharge:      Gabriela Nicholson OT /MARISA  [id number]

## 2024-04-18 ENCOUNTER — TREATMENT (OUTPATIENT)
Dept: OCCUPATIONAL THERAPY | Age: 59
End: 2024-04-18

## 2024-04-18 DIAGNOSIS — S66.919A STRAIN OF UNSPECIFIED MUSCLE, FASCIA AND TENDON AT WRIST AND HAND LEVEL, UNSPECIFIED HAND, INITIAL ENCOUNTER: Primary | ICD-10-CM

## 2024-04-18 NOTE — PROGRESS NOTES
OCCUPATIONAL THERAPY DAILY NOTE/ UPDATE  [facility] PHYSICIANS [place] SPECIALTY CARE [facility] OCCUPATIONAL THERAPY   [place] [address]  [place] [address]  Dept: [phone]  Loc: [phone]   [facility] OT Fax: [phone]      Date:  4/10/2024  Initial Evaluation Date: 3-12-24     Evaluating Therapist: Gabriela Nicholson OT    Patient Name:  Yelitza Paz    :  1965    Restrictions/Precautions:  Thumb EPL transfer protocol, Low fall risk  Diagnosis:  S66.919A Strain of unspecified  muscle, fascia, tendon at the wrist and hand level, unspecified hand                                                   Date of Surgery/Injury: R EPI tendon trransfer to EPL.(24)     Insurance/Certification information:  [facility]  Plan of care signed (Y/N): Y  Visit# / total visits: Eval+ 6  / up to 16 visits     Referring Practitioner:  Dr Branden Arce  Specific Practitioner Orders:PROM, AAROM, AROM, stretching, scar mgmt, strengthening and modalities as needed.     Assessment of current deficits   [] Functional mobility             [x] ADLs           [x] Strength                  [] Cognition   [] Functional transfers           [x] IADLs          [] Safety Awareness  [] Endurance   [x] Fine Motor Coordination    [] Balance      [] Vision/perception    [] Sensation     [] Gross Motor Coordination [x] ROM           [x] Pain                        [x] Edema          [x] Scar Adhesion/Skin Integrity      OT PLAN OF CARE   OT POC based on physician orders, patient diagnosis and results of clinical assessment     Frequency/Duration: 1-2x/ week x 6-8 weeks     Specific OT Treatment to include:   [x] Instruction in HEP                   Modalities:  [x] Therapeutic Exercise                 [x] Ultrasound               [] Electrical Stimulation/Attended  [x] PROM/Stretching                    [x] Fluidotherapy          [x]  Paraffin                   [x] AAROM  [x] AROM                 [] Iontophoresis:   [x] Tendon Glides

## 2024-04-23 NOTE — TELEPHONE ENCOUNTER
Patient is requesting refill on cymbalta 60 mg 1 capsule qd sent 1-17-24 #30 2 refills.  Patient last visit 2-1-24 no return visit scheduled.

## 2024-04-25 ENCOUNTER — TREATMENT (OUTPATIENT)
Dept: OCCUPATIONAL THERAPY | Age: 59
End: 2024-04-25

## 2024-04-25 DIAGNOSIS — S66.919A STRAIN OF UNSPECIFIED MUSCLE, FASCIA AND TENDON AT WRIST AND HAND LEVEL, UNSPECIFIED HAND, INITIAL ENCOUNTER: Primary | ICD-10-CM

## 2024-04-25 NOTE — TELEPHONE ENCOUNTER
Patient left message requesting 90 days of Duloxetine 60 mg be sent to [facility]. Last office visit  2/1/24 and no follow up . Please advise.

## 2024-04-25 NOTE — PROGRESS NOTES
OCCUPATIONAL THERAPY DAILY NOTE/ DISCHARGE  [facility] PHYSICIANS [place] SPECIALTY CARE [facility] OCCUPATIONAL THERAPY   [place] [address]  [place] [address]  Dept: [phone]  Loc: [phone]   [facility] OT Fax: [phone]      Date:  2024  Initial Evaluation Date: 3-12-24     Evaluating Therapist: Gabriela Nicholson OT    Patient Name:  Yelitza Paz    :  1965    Restrictions/Precautions:  Thumb EPL transfer protocol, Low fall risk  Diagnosis:  S66.919A Strain of unspecified  muscle, fascia, tendon at the wrist and hand level, unspecified hand                                                   Date of Surgery/Injury: R EPI tendon trransfer to EPL.(24)     Insurance/Certification information:  [facility]  Plan of care signed (Y/N): Y  Visit# / total visits: Eval+ 7  / up to 16 visits     Referring Practitioner:  Dr Branden Arce  Specific Practitioner Orders:PROM, AAROM, AROM, stretching, scar mgmt, strengthening and modalities as needed.     Assessment of current deficits   [] Functional mobility             [x] ADLs           [x] Strength                  [] Cognition   [] Functional transfers           [x] IADLs          [] Safety Awareness  [] Endurance   [x] Fine Motor Coordination    [] Balance      [] Vision/perception    [] Sensation     [] Gross Motor Coordination [x] ROM           [x] Pain                        [x] Edema          [x] Scar Adhesion/Skin Integrity      OT PLAN OF CARE   OT POC based on physician orders, patient diagnosis and results of clinical assessment     Frequency/Duration: 1-2x/ week x 6-8 weeks     Specific OT Treatment to include:   [x] Instruction in HEP                   Modalities:  [x] Therapeutic Exercise                 [x] Ultrasound               [] Electrical Stimulation/Attended  [x] PROM/Stretching                    [x] Fluidotherapy          [x]  Paraffin                   [x] AAROM  [x] AROM                 [] Iontophoresis:   [x] Tendon

## 2024-04-26 DIAGNOSIS — I83.891 SYMPTOMATIC VARICOSE VEINS OF RIGHT LOWER EXTREMITY: Primary | ICD-10-CM

## 2024-04-26 RX ORDER — DULOXETIN HYDROCHLORIDE 60 MG/1
CAPSULE, DELAYED RELEASE ORAL DAILY
Qty: 30 CAPSULE | Refills: 2 | Status: SHIPPED | OUTPATIENT
Start: 2024-04-26

## 2024-04-26 RX ORDER — DULOXETIN HYDROCHLORIDE 60 MG/1
60 CAPSULE, DELAYED RELEASE ORAL DAILY
Qty: 30 CAPSULE | Refills: 2 | Status: SHIPPED | OUTPATIENT
Start: 2024-04-26 | End: 2024-07-25

## 2024-05-06 RX ORDER — HYDROXYCHLOROQUINE SULFATE 200 MG/1
200 TABLET, FILM COATED ORAL DAILY
COMMUNITY

## 2024-05-06 NOTE — PROGRESS NOTES
[facility] PRE-ADMISSION TESTING INSTRUCTIONS    The Preadmission Testing patient is instructed accordingly using the following criteria (check applicable):    ARRIVAL INSTRUCTIONS:  [x] Parking the day of Surgery is located in the Main Entrance lot.  Upon entering the door, make an immediate right to the surgery reception desk    [x] Bring photo ID and insurance card    [x] Bring in a copy of Living will or Durable Power of  papers.    [x] Please be sure to arrange for a responsible adult to provide transportation to and from the hospital    [x] Please arrange for a responsible adult to be with you for the 24 hour period post procedure due to having anesthesia    [x] If you awake am of surgery not feeling well or have temperature >100 please call [phone]    GENERAL INSTRUCTIONS:    [x] No solid foods after midnight, may have up to 8oz of water until 4 hours prior to surgery. No gum, no candy, no mints. NPO time:       [x] You may brush your teeth, do not swallow any toothpaste    [x] Take medications as instructed     [x] Stop herbal supplements and vitamins 5 days prior to procedure    [x] Follow preop dosing of blood thinners per physician instructions    [] Take 1/2 dose of evening insulin, but no insulin after midnight    [] No oral diabetic medications after midnight    [] If diabetic and have low blood sugar or feel symptomatic, take 1-2oz apple juice only    [] Bring inhalers day of surgery    [] Bring urine specimen day of surgery    [x] Shower or bath with soap, lather and rinse well, AM of Surgery, no lotion, powders or creams to surgical site    [] Follow bowel prep as instructed per surgeon    [x] No tobacco products within 24 hours of surgery     [x] No alcohol or illegal drug use, marijuana included, within 24 hours of surgery.    [x] Jewelry, body piercing's, eyeglasses, contact lenses and dentures are not permitted into surgery (bring cases)      [x] Please do

## 2024-05-07 ENCOUNTER — HOSPITAL ENCOUNTER (OUTPATIENT)
Dept: PREADMISSION TESTING | Age: 59
Discharge: HOME | End: 2024-05-07
Payer: COMMERCIAL

## 2024-05-07 LAB
ABO + RH BLD: NORMAL
ANION GAP SERPL CALCULATED.3IONS-SCNC: 8 MMOL/L (ref 7–16)
ARM BAND NUMBER: NORMAL
BLOOD BANK SAMPLE EXPIRATION: NORMAL
BLOOD GROUP ANTIBODIES SERPL: NEGATIVE
BUN SERPL-MCNC: 14 MG/DL (ref 6–20)
CALCIUM SERPL-MCNC: 9.1 MG/DL (ref 8.6–10.2)
CHLORIDE SERPL-SCNC: 105 MMOL/L (ref 98–107)
CO2 SERPL-SCNC: 27 MMOL/L (ref 22–29)
CREAT SERPL-MCNC: 0.9 MG/DL (ref 0.5–1)
ERYTHROCYTE [DISTWIDTH] IN BLOOD BY AUTOMATED COUNT: 13.4 % (ref 11.5–15)
GFR, ESTIMATED: 79 ML/MIN/1.73M2
GLUCOSE SERPL-MCNC: 93 MG/DL (ref 74–99)
HCT VFR BLD AUTO: 40 % (ref 34–48)
HGB BLD-MCNC: 12.5 G/DL (ref 11.5–15.5)
INR PPP: 1
MCH RBC QN AUTO: 30.6 PG (ref 26–35)
MCHC RBC AUTO-ENTMCNC: 31.3 G/DL (ref 32–34.5)
MCV RBC AUTO: 97.8 FL (ref 80–99.9)
PLATELET # BLD AUTO: 249 K/UL (ref 130–450)
PMV BLD AUTO: 10 FL (ref 7–12)
POTASSIUM SERPL-SCNC: 4.4 MMOL/L (ref 3.5–5)
PROTHROMBIN TIME: 11.2 SEC (ref 9.3–12.4)
RBC # BLD AUTO: 4.09 M/UL (ref 3.5–5.5)
SODIUM SERPL-SCNC: 140 MMOL/L (ref 132–146)
WBC OTHER # BLD: 5.6 K/UL (ref 4.5–11.5)

## 2024-05-07 PROCEDURE — 85610 PROTHROMBIN TIME: CPT

## 2024-05-07 PROCEDURE — 86850 RBC ANTIBODY SCREEN: CPT

## 2024-05-07 PROCEDURE — 86901 BLOOD TYPING SEROLOGIC RH(D): CPT

## 2024-05-07 PROCEDURE — 85027 COMPLETE CBC AUTOMATED: CPT

## 2024-05-07 PROCEDURE — 86900 BLOOD TYPING SEROLOGIC ABO: CPT

## 2024-05-07 PROCEDURE — 36415 COLL VENOUS BLD VENIPUNCTURE: CPT

## 2024-05-07 PROCEDURE — 80048 BASIC METABOLIC PNL TOTAL CA: CPT

## 2024-05-08 ENCOUNTER — ANESTHESIA EVENT (OUTPATIENT)
Dept: OPERATING ROOM | Age: 59
End: 2024-05-08
Payer: COMMERCIAL

## 2024-05-08 ASSESSMENT — LIFESTYLE VARIABLES: SMOKING_STATUS: 0

## 2024-05-08 NOTE — ANESTHESIA PRE PROCEDURE
Evaluation  Patient summary reviewed   no history of anesthetic complications:   Airway: Mallampati: III  TM distance: >3 FB   Neck ROM: full  Mouth opening: > = 3 FB   Dental: normal exam         Pulmonary:normal exam    (+)     sleep apnea: on CPAP,           (-) not a current smoker                           Cardiovascular:  Exercise tolerance: good (>4 METS)  (+) hypertension:      ECG reviewed               Beta Blocker:  Dose within 24 Hrs         Neuro/Psych:   (+) headaches:depression/anxiety             GI/Hepatic/Renal:   (+) morbid obesity          Endo/Other:    (+) : arthritis: rheumatoid..                 Abdominal:             Vascular:           ROS comment: Varicose veins. Other Findings:             Anesthesia Plan      MAC     ASA 3       Induction: intravenous.    MIPS: Postoperative opioids intended and Prophylactic antiemetics administered.  Anesthetic plan and risks discussed with patient.                        Rosie Tran DO   5/8/2024

## 2024-05-09 ENCOUNTER — HOSPITAL ENCOUNTER (OUTPATIENT)
Dept: ULTRASOUND IMAGING | Age: 59
Setting detail: OUTPATIENT SURGERY
Discharge: HOME | End: 2024-05-11
Attending: SURGERY
Payer: COMMERCIAL

## 2024-05-09 ENCOUNTER — ANESTHESIA (OUTPATIENT)
Dept: OPERATING ROOM | Age: 59
End: 2024-05-09
Payer: COMMERCIAL

## 2024-05-09 ENCOUNTER — HOSPITAL ENCOUNTER (OUTPATIENT)
Age: 59
Setting detail: OUTPATIENT SURGERY
Discharge: HOME | End: 2024-05-09
Attending: SURGERY | Admitting: SURGERY
Payer: COMMERCIAL

## 2024-05-09 VITALS
TEMPERATURE: 96.8 F | DIASTOLIC BLOOD PRESSURE: 73 MMHG | RESPIRATION RATE: 20 BRPM | HEIGHT: 64 IN | WEIGHT: 250 LBS | HEART RATE: 66 BPM | OXYGEN SATURATION: 98 % | BODY MASS INDEX: 42.68 KG/M2 | SYSTOLIC BLOOD PRESSURE: 123 MMHG

## 2024-05-09 DIAGNOSIS — I87.2 VENOUS INSUFFICIENCY: ICD-10-CM

## 2024-05-09 DIAGNOSIS — I83.891 SYMPTOMATIC VARICOSE VEINS OF RIGHT LOWER EXTREMITY: ICD-10-CM

## 2024-05-09 PROCEDURE — 6370000000 HC RX 637 (ALT 250 FOR IP): Performed by: STUDENT IN AN ORGANIZED HEALTH CARE EDUCATION/TRAINING PROGRAM

## 2024-05-09 PROCEDURE — C1894 INTRO/SHEATH, NON-LASER: HCPCS | Performed by: SURGERY

## 2024-05-09 PROCEDURE — 7100000010 HC PHASE II RECOVERY - FIRST 15 MIN: Performed by: SURGERY

## 2024-05-09 PROCEDURE — 3600000002 HC SURGERY LEVEL 2 BASE: Performed by: SURGERY

## 2024-05-09 PROCEDURE — 88304 TISSUE EXAM BY PATHOLOGIST: CPT

## 2024-05-09 PROCEDURE — 6360000002 HC RX W HCPCS: Performed by: PHYSICIAN ASSISTANT

## 2024-05-09 PROCEDURE — 2580000003 HC RX 258: Performed by: SURGERY

## 2024-05-09 PROCEDURE — 7100000011 HC PHASE II RECOVERY - ADDTL 15 MIN: Performed by: SURGERY

## 2024-05-09 PROCEDURE — 3700000001 HC ADD 15 MINUTES (ANESTHESIA): Performed by: SURGERY

## 2024-05-09 PROCEDURE — 3700000000 HC ANESTHESIA ATTENDED CARE: Performed by: SURGERY

## 2024-05-09 PROCEDURE — 6360000002 HC RX W HCPCS: Performed by: SURGERY

## 2024-05-09 PROCEDURE — C1769 GUIDE WIRE: HCPCS | Performed by: SURGERY

## 2024-05-09 PROCEDURE — C1888 ENDOVAS NON-CARDIAC ABL CATH: HCPCS | Performed by: SURGERY

## 2024-05-09 PROCEDURE — 3600000012 HC SURGERY LEVEL 2 ADDTL 15MIN: Performed by: SURGERY

## 2024-05-09 PROCEDURE — 37765 STAB PHLEB VEINS XTR 10-20: CPT | Performed by: SURGERY

## 2024-05-09 PROCEDURE — 2580000003 HC RX 258: Performed by: PHYSICIAN ASSISTANT

## 2024-05-09 PROCEDURE — A4217 STERILE WATER/SALINE, 500 ML: HCPCS | Performed by: SURGERY

## 2024-05-09 PROCEDURE — 2709999900 HC NON-CHARGEABLE SUPPLY: Performed by: SURGERY

## 2024-05-09 PROCEDURE — 6360000002 HC RX W HCPCS: Performed by: STUDENT IN AN ORGANIZED HEALTH CARE EDUCATION/TRAINING PROGRAM

## 2024-05-09 PROCEDURE — 36475 ENDOVENOUS RF 1ST VEIN: CPT | Performed by: SURGERY

## 2024-05-09 PROCEDURE — 76998 US GUIDE INTRAOP: CPT

## 2024-05-09 PROCEDURE — 2500000003 HC RX 250 WO HCPCS: Performed by: SURGERY

## 2024-05-09 RX ORDER — SODIUM CHLORIDE 0.9 % (FLUSH) 0.9 %
5-40 SYRINGE (ML) INJECTION EVERY 12 HOURS SCHEDULED
Status: DISCONTINUED | OUTPATIENT
Start: 2024-05-09 | End: 2024-05-09 | Stop reason: HOSPADM

## 2024-05-09 RX ORDER — FENTANYL CITRATE 50 UG/ML
INJECTION, SOLUTION INTRAMUSCULAR; INTRAVENOUS PRN
Status: DISCONTINUED | OUTPATIENT
Start: 2024-05-09 | End: 2024-05-09 | Stop reason: SDUPTHER

## 2024-05-09 RX ORDER — OXYCODONE HYDROCHLORIDE AND ACETAMINOPHEN 5; 325 MG/1; MG/1
1 TABLET ORAL EVERY 6 HOURS PRN
Qty: 28 TABLET | Refills: 0 | Status: SHIPPED | OUTPATIENT
Start: 2024-05-09 | End: 2024-05-16

## 2024-05-09 RX ORDER — FENTANYL CITRATE 50 UG/ML
50 INJECTION, SOLUTION INTRAMUSCULAR; INTRAVENOUS EVERY 5 MIN PRN
Status: DISCONTINUED | OUTPATIENT
Start: 2024-05-09 | End: 2024-05-09 | Stop reason: HOSPADM

## 2024-05-09 RX ORDER — PROCHLORPERAZINE EDISYLATE 5 MG/ML
5 INJECTION INTRAMUSCULAR; INTRAVENOUS
Status: DISCONTINUED | OUTPATIENT
Start: 2024-05-09 | End: 2024-05-09 | Stop reason: HOSPADM

## 2024-05-09 RX ORDER — SODIUM CHLORIDE 0.9 % (FLUSH) 0.9 %
5-40 SYRINGE (ML) INJECTION PRN
Status: DISCONTINUED | OUTPATIENT
Start: 2024-05-09 | End: 2024-05-09 | Stop reason: HOSPADM

## 2024-05-09 RX ORDER — SODIUM CHLORIDE 9 MG/ML
INJECTION, SOLUTION INTRAVENOUS PRN
Status: DISCONTINUED | OUTPATIENT
Start: 2024-05-09 | End: 2024-05-09 | Stop reason: HOSPADM

## 2024-05-09 RX ORDER — SODIUM CHLORIDE 9 MG/ML
INJECTION, SOLUTION INTRAVENOUS CONTINUOUS
Status: DISCONTINUED | OUTPATIENT
Start: 2024-05-09 | End: 2024-05-09 | Stop reason: HOSPADM

## 2024-05-09 RX ORDER — OXYCODONE HYDROCHLORIDE 5 MG/1
5 TABLET ORAL
Status: COMPLETED | OUTPATIENT
Start: 2024-05-09 | End: 2024-05-09

## 2024-05-09 RX ORDER — CEFAZOLIN 2 G/1
INJECTION, POWDER, FOR SOLUTION INTRAMUSCULAR; INTRAVENOUS
Status: DISCONTINUED
Start: 2024-05-09 | End: 2024-05-09 | Stop reason: HOSPADM

## 2024-05-09 RX ORDER — PROPOFOL 10 MG/ML
INJECTION, EMULSION INTRAVENOUS CONTINUOUS PRN
Status: DISCONTINUED | OUTPATIENT
Start: 2024-05-09 | End: 2024-05-09 | Stop reason: SDUPTHER

## 2024-05-09 RX ORDER — MIDAZOLAM HYDROCHLORIDE 1 MG/ML
INJECTION INTRAMUSCULAR; INTRAVENOUS PRN
Status: DISCONTINUED | OUTPATIENT
Start: 2024-05-09 | End: 2024-05-09 | Stop reason: SDUPTHER

## 2024-05-09 RX ORDER — NALOXONE HYDROCHLORIDE 0.4 MG/ML
INJECTION, SOLUTION INTRAMUSCULAR; INTRAVENOUS; SUBCUTANEOUS PRN
Status: DISCONTINUED | OUTPATIENT
Start: 2024-05-09 | End: 2024-05-09 | Stop reason: HOSPADM

## 2024-05-09 RX ADMIN — PROPOFOL 100 MCG/KG/MIN: 10 INJECTION, EMULSION INTRAVENOUS at 07:04

## 2024-05-09 RX ADMIN — FENTANYL CITRATE 50 MCG: 50 INJECTION, SOLUTION INTRAMUSCULAR; INTRAVENOUS at 07:37

## 2024-05-09 RX ADMIN — SODIUM CHLORIDE: 9 INJECTION, SOLUTION INTRAVENOUS at 06:58

## 2024-05-09 RX ADMIN — Medication 500 ML: at 07:33

## 2024-05-09 RX ADMIN — MIDAZOLAM 2 MG: 1 INJECTION INTRAMUSCULAR; INTRAVENOUS at 06:58

## 2024-05-09 RX ADMIN — FENTANYL CITRATE 50 MCG: 50 INJECTION, SOLUTION INTRAMUSCULAR; INTRAVENOUS at 07:15

## 2024-05-09 RX ADMIN — OXYCODONE 5 MG: 5 TABLET ORAL at 09:00

## 2024-05-09 RX ADMIN — WATER 2000 MG: 1 INJECTION INTRAMUSCULAR; INTRAVENOUS; SUBCUTANEOUS at 07:10

## 2024-05-09 ASSESSMENT — PAIN DESCRIPTION - ORIENTATION
ORIENTATION: RIGHT
ORIENTATION: RIGHT

## 2024-05-09 ASSESSMENT — PAIN - FUNCTIONAL ASSESSMENT
PAIN_FUNCTIONAL_ASSESSMENT: 0-10
PAIN_FUNCTIONAL_ASSESSMENT: NONE - DENIES PAIN

## 2024-05-09 ASSESSMENT — PAIN SCALES - GENERAL
PAINLEVEL_OUTOF10: 4
PAINLEVEL_OUTOF10: 4

## 2024-05-09 ASSESSMENT — PAIN DESCRIPTION - DESCRIPTORS
DESCRIPTORS: BURNING;DISCOMFORT
DESCRIPTORS: BURNING;DISCOMFORT

## 2024-05-09 ASSESSMENT — PAIN DESCRIPTION - LOCATION
LOCATION: LEG
LOCATION: LEG

## 2024-05-09 NOTE — H&P
Vascular Surgery History & Physical Exam      Chief Complaint: Symptomatic varicose veins    HISTORY OF PRESENT ILLNESS:                The patient is a 58 y.o. female who presents to the hospital for elective treatment of symptomatic varicose veins of the right lower extremity.  The patient denies any problems since last seen in the office.    IMPRESSION:  Symptomatic varicose veins of the right lower extremity.  Active Hospital Problems    Diagnosis     Venous insufficiency [I87.2]     Symptomatic varicose veins of right lower extremity [I83.891]        PLAN:  Radiofrequency ablation of the right great saphenous vein with stab phlebectomies of varicose branches.    I reviewed the procedure with the patient.  I discussed the risks, benefits, and alternatives of the procedure.  The patient understands and consents.  All questions were answered.    Patient Active Problem List   Diagnosis Code    Primary osteoarthritis of right knee M17.11    Primary osteoarthritis of left knee M17.12    Primary osteoarthritis of one knee, left M17.12    Carpal tunnel syndrome of left wrist G56.02    Hyperglycemia R73.9    Hyperlipidemia E78.5    Morbid obesity (HCC) E66.01    Major depression F32.9    Somatic dysfunction of back M99.09    Tingling of both feet R20.2    Primary hypertension I10    Varicose veins of right lower extremity with pain I83.811    Sleep-disordered breathing G47.30    Back pain with right-sided radiculopathy M54.10    Facet arthropathy, lumbar M47.816    Strain of unspecified muscle, fascia and tendon at wrist and hand level, unspecified hand, initial encounter S66.919A    Sleep apnea G47.30    Venous insufficiency I87.2    Symptomatic varicose veins of right lower extremity I83.891       Past Medical History:   Diagnosis Date    Allergic rhinitis     Anxiety October 2022    Arthritis     Back pain with right-sided radiculopathy 12/21/2023    Back pain with right-sided radiculopathy 12/21/2023    Depression

## 2024-05-09 NOTE — ANESTHESIA POSTPROCEDURE EVALUATION
Department of Anesthesiology  Postprocedure Note    Patient: Yelitza Paz  MRN: [id number]  YOB: 1965  Date of evaluation: 5/9/2024    Procedure Summary       Date: 05/09/24 Room / Location: [address]    Anesthesia Start: 0658 Anesthesia Stop: 0823    Procedure: RADIOFREQUENCY ABLATION RIGHT GREATER SAPHENOUS VEINS WITH STAB PHLEBECTOMIES WITH ULTRASOUND (Right: Leg Lower) Diagnosis:       Venous insufficiency      Symptomatic varicose veins of right lower extremity      (Venous insufficiency [I87.2])      (Symptomatic varicose veins of right lower extremity [I83.891])    Surgeons: José Miguel Mayorga MD Responsible Provider: Rosie Tran DO    Anesthesia Type: MAC ASA Status: 3            Anesthesia Type: MAC    Reena Phase I: Reena Score: 10    Reena Phase II: Reena Score: 10    Anesthesia Post Evaluation    Patient location during evaluation: PACU  Patient participation: complete - patient participated  Level of consciousness: awake and alert  Nausea & Vomiting: no vomiting and no nausea  Cardiovascular status: hemodynamically stable  Respiratory status: acceptable and spontaneous ventilation  Hydration status: stable  Pain management: adequate    No notable events documented.

## 2024-05-09 NOTE — DISCHARGE INSTRUCTIONS
[facility] AMBULATORY PROCEDURE DISCHARGE INSTRUCTIONS  You may be drowsy or lightheaded after receiving sedation or anesthesia.    A responsible person should be with you for the next 24 hours.    Please follow the instructions checked below:    DIET INSTRUCTIONS:  [x]Start with light diet and progress to your normal diet as you feel like eating. If you experience nausea or repeated episodes of vomiting which persist beyond 12-24 hours, notify your doctor.  []Other     ACTIVITY INSTRUCTIONS:  [x]Rest today. Increase activity as tolerated    [x]Elevate operative limb   [x]No heavy lifting or strenuous activity     [x]No driving for 2 days  []Other     WOUND/DRESSING INSTRUCTIONS:  Always ensure you and your care giver clean hands before and after caring for the wound.  [x]May shower  - after bandage removed    [x]May bathe - after bandage reomved     []Derma bond dressing-Do not apply lotion, gel, or liquid to wound while the derma bond is in place.   [x]Other   - Remove ACE bandage and gauze wrap 48 hrs after surgery.  Leave Steri-strips on.  OK to shower/bathe.  Steri-strips will begin to fall off in 4-5 days.      MEDICATION INSTRUCTIONS:    [x]Prescriptions sent with you.  Use as directed.  When taking pain medications, you may experience dizziness or drowsiness.  Do not drink alcohol or drive when taking these medications.  [x]You may take a non-prescription “headache remedy”, preferably one that does not contain aspirin.      Other Instructions:        FOLLOW-UP CARE:  [x]Call the office at [phone] for follow-up appointment for this or next Tuesday.  Watch for these significant complications.  Call physician if they or any other problems occur:  Fever over 101°    Redness, swelling, hardness or warmth at the operative site  Unrelieved nausea    Foul smelling or cloudy drainage at the operative site   Unrelieved pain    Blood soaked dressing. (Some oozing may be normal)    José Miguel CARRILLO

## 2024-05-09 NOTE — OP NOTE
Operative Note      Patient: Yelitza Paz  YOB: 1965  MRN: [id number]    Date of Procedure: 5/9/2024    Pre-Op Diagnosis Codes:     * Venous insufficiency [I87.2]     * Symptomatic varicose veins of right lower extremity [I83.891] painful    Post-Op Diagnosis: Same       Procedure(s):  RADIOFREQUENCY ABLATION RIGHT GREATER SAPHENOUS VEINS WITH STAB PHLEBECTOMIES WITH ULTRASOUND x 20 incisions    Surgeon(s):  José Miguel Mayorga MD    Assistant:   Surgical Assistant: Faustina Lopez, APRSIGRID - CNP    Anesthesia: Monitor Anesthesia Care    Estimated Blood Loss (mL): less than 100     Complications: None    Specimens:   ID Type Source Tests Collected by Time Destination   A : VERICOSE VEINS RIGHT LOWER EXTREMITY Tissue Tissue SURGICAL PATHOLOGY José Miguel Mayorga MD 5/9/2024 0751        Implants:  * No implants in log *      Drains: * No LDAs found *    Findings:  Infection Present At Time Of Surgery (PATOS) (choose all levels that have infection present):  No infection present  Other Findings:      DESCRIPTION OF PROCEDURE: The patient was identified and the procedure was confirmed. The right leg was prepped and draped in the usual sterile fashion.    Then, 1% lidocaine mixed with 0.25% Marcaine was used for local anesthesia. Under ultrasound guidance, the great saphenous vein was percutaneously entered at the level of the mid calf, and a 7-Venezuelan sheath was inserted into the vein. The radiofrequency ablation catheter was inserted through the sheath and advanced through the vein.  The tip was positioned 2 cm from the saphenofemoral junction under ultrasound guidance. Tumescent anesthesia was then injected over the length of the vein to ensure 2 cm of distance from the vein to the skin. Radiofrequency ablation was then performed for a total of 7 cycles, 2 cycles in the first segment. The catheter and sheath were removed and pressure was held for hemostasis.     Next, an 11-blade was used to

## 2024-05-13 ENCOUNTER — HOSPITAL ENCOUNTER (OUTPATIENT)
Dept: CARDIOLOGY | Age: 59
Discharge: HOME | End: 2024-05-15
Attending: SURGERY
Payer: COMMERCIAL

## 2024-05-13 ENCOUNTER — OFFICE VISIT (OUTPATIENT)
Dept: VASCULAR SURGERY | Age: 59
End: 2024-05-13

## 2024-05-13 VITALS — WEIGHT: 250 LBS | BODY MASS INDEX: 42.91 KG/M2

## 2024-05-13 DIAGNOSIS — I83.891 SYMPTOMATIC VARICOSE VEINS OF RIGHT LOWER EXTREMITY: ICD-10-CM

## 2024-05-13 DIAGNOSIS — I83.891 SYMPTOMATIC VARICOSE VEINS OF RIGHT LOWER EXTREMITY: Primary | ICD-10-CM

## 2024-05-13 DIAGNOSIS — I83.813 VARICOSE VEINS OF BILATERAL LOWER EXTREMITIES WITH PAIN: ICD-10-CM

## 2024-05-13 LAB — SURGICAL PATHOLOGY REPORT: NORMAL

## 2024-05-13 PROCEDURE — 93971 EXTREMITY STUDY: CPT

## 2024-05-13 PROCEDURE — 99024 POSTOP FOLLOW-UP VISIT: CPT | Performed by: SURGERY

## 2024-05-13 PROCEDURE — 93971 EXTREMITY STUDY: CPT | Performed by: SURGERY

## 2024-05-13 NOTE — PROGRESS NOTES
5/13/2024    Yelitza EDX Brandi  1965 5/9/24 R GSV ablation, stab phlebectomy     Patient returns for post operative evaluation status post.  Has some expected soreness.  Overall feeling well and pain is controlled.      Physical Exam:    Gen Awake and alert  Right LE  incisions are healing without evidence of infection.  No new swelling.  Expected bruising  Some fullness in medial thigh - suspect either her gsv or thrombosed varicosity    A/P s/p R GSV ablation, stab phlebectomy  Us negative for dvt, R GSV thrombosed as expected after ablation  I reviewed with the patient that normal activities can be resumed as tolerated.  Call with any problems related to incisions  Continued use of compression stockings lifetime  Replacement compression stockings every 6 months and stretched out  Call with increased swelling, pain, problems with legs  New script for pantyhose 20-30 mm hg   She has know significant reflux of the left gsv  She will fu in 2 months to discuss L LE   If she is doing well and not having significant issues with L LE she will call and cancel apptonyt    José Miguel Mayorga MD

## 2024-05-23 ENCOUNTER — OFFICE VISIT (OUTPATIENT)
Dept: FAMILY MEDICINE CLINIC | Age: 59
End: 2024-05-23
Payer: COMMERCIAL

## 2024-05-23 VITALS
BODY MASS INDEX: 44.39 KG/M2 | DIASTOLIC BLOOD PRESSURE: 76 MMHG | RESPIRATION RATE: 16 BRPM | HEIGHT: 64 IN | SYSTOLIC BLOOD PRESSURE: 126 MMHG | HEART RATE: 73 BPM | WEIGHT: 260 LBS | OXYGEN SATURATION: 98 % | TEMPERATURE: 97 F

## 2024-05-23 DIAGNOSIS — F32.1 CURRENT MODERATE EPISODE OF MAJOR DEPRESSIVE DISORDER WITHOUT PRIOR EPISODE (HCC): ICD-10-CM

## 2024-05-23 DIAGNOSIS — R73.01 IMPAIRED FASTING GLUCOSE: ICD-10-CM

## 2024-05-23 DIAGNOSIS — Z13.31 POSITIVE DEPRESSION SCREENING: ICD-10-CM

## 2024-05-23 DIAGNOSIS — M54.10 BACK PAIN WITH RIGHT-SIDED RADICULOPATHY: ICD-10-CM

## 2024-05-23 DIAGNOSIS — I10 PRIMARY HYPERTENSION: Primary | ICD-10-CM

## 2024-05-23 PROBLEM — R76.8 ELEVATED ANTINUCLEAR ANTIBODY (ANA) LEVEL: Status: ACTIVE | Noted: 2024-05-23

## 2024-05-23 PROBLEM — G47.30 SLEEP-DISORDERED BREATHING: Status: RESOLVED | Noted: 2023-09-14 | Resolved: 2024-05-23

## 2024-05-23 PROBLEM — M99.09 SOMATIC DYSFUNCTION OF BACK: Status: RESOLVED | Noted: 2023-06-08 | Resolved: 2024-05-23

## 2024-05-23 PROCEDURE — 3074F SYST BP LT 130 MM HG: CPT | Performed by: NURSE PRACTITIONER

## 2024-05-23 PROCEDURE — 3078F DIAST BP <80 MM HG: CPT | Performed by: NURSE PRACTITIONER

## 2024-05-23 PROCEDURE — 99214 OFFICE O/P EST MOD 30 MIN: CPT | Performed by: NURSE PRACTITIONER

## 2024-05-23 RX ORDER — CLOBETASOL PROPIONATE 0.5 MG/G
0.05 OINTMENT TOPICAL 2 TIMES DAILY
COMMUNITY

## 2024-05-23 RX ORDER — ATENOLOL 50 MG/1
50 TABLET ORAL NIGHTLY
Qty: 90 TABLET | Refills: 1 | Status: SHIPPED | OUTPATIENT
Start: 2024-05-23

## 2024-05-23 RX ORDER — METHOTREXATE 2.5 MG/1
2.5 TABLET ORAL
COMMUNITY
Start: 2024-05-02

## 2024-05-23 RX ORDER — CELECOXIB 200 MG/1
200 CAPSULE ORAL DAILY
COMMUNITY
Start: 2024-05-06

## 2024-05-23 ASSESSMENT — ENCOUNTER SYMPTOMS
VOICE CHANGE: 0
VOMITING: 0
WHEEZING: 0
BACK PAIN: 1
DIARRHEA: 0
NAUSEA: 0
COUGH: 0
SORE THROAT: 0
SHORTNESS OF BREATH: 0
SINUS PAIN: 0
TROUBLE SWALLOWING: 0
SINUS PRESSURE: 0
COLOR CHANGE: 0
CHEST TIGHTNESS: 0
RHINORRHEA: 0
CONSTIPATION: 0
FACIAL SWELLING: 0
ABDOMINAL PAIN: 0

## 2024-05-23 ASSESSMENT — PATIENT HEALTH QUESTIONNAIRE - PHQ9
6. FEELING BAD ABOUT YOURSELF - OR THAT YOU ARE A FAILURE OR HAVE LET YOURSELF OR YOUR FAMILY DOWN: SEVERAL DAYS
SUM OF ALL RESPONSES TO PHQ QUESTIONS 1-9: 10
SUM OF ALL RESPONSES TO PHQ QUESTIONS 1-9: 10
10. IF YOU CHECKED OFF ANY PROBLEMS, HOW DIFFICULT HAVE THESE PROBLEMS MADE IT FOR YOU TO DO YOUR WORK, TAKE CARE OF THINGS AT HOME, OR GET ALONG WITH OTHER PEOPLE: SOMEWHAT DIFFICULT
1. LITTLE INTEREST OR PLEASURE IN DOING THINGS: SEVERAL DAYS
8. MOVING OR SPEAKING SO SLOWLY THAT OTHER PEOPLE COULD HAVE NOTICED. OR THE OPPOSITE, BEING SO FIGETY OR RESTLESS THAT YOU HAVE BEEN MOVING AROUND A LOT MORE THAN USUAL: NOT AT ALL
5. POOR APPETITE OR OVEREATING: MORE THAN HALF THE DAYS
4. FEELING TIRED OR HAVING LITTLE ENERGY: SEVERAL DAYS
2. FEELING DOWN, DEPRESSED OR HOPELESS: SEVERAL DAYS
3. TROUBLE FALLING OR STAYING ASLEEP: NEARLY EVERY DAY
SUM OF ALL RESPONSES TO PHQ9 QUESTIONS 1 & 2: 2
7. TROUBLE CONCENTRATING ON THINGS, SUCH AS READING THE NEWSPAPER OR WATCHING TELEVISION: SEVERAL DAYS
9. THOUGHTS THAT YOU WOULD BE BETTER OFF DEAD, OR OF HURTING YOURSELF: NOT AT ALL
SUM OF ALL RESPONSES TO PHQ QUESTIONS 1-9: 10
SUM OF ALL RESPONSES TO PHQ QUESTIONS 1-9: 10

## 2024-05-23 NOTE — ASSESSMENT & PLAN NOTE
well controlled, no significant side effects from medication noted.Continue atenolol 50 mg daily labs reviewed:CMP, CBCD, Lipids,fasting labs ordered for 2 weeks   -Discussed taking medication as directed every day around the same time. Do not double up if skipped or missed doses.   -Discussed exercising daily 30 minutes 5 times a week for 150 minutes weekly to help with stress reduction and weight reduction if needed.  -Discussed low sodium diet.  -Discussed limiting caffeine consumption and tobacco cessation and the effects they have on the heart and blood pressure.

## 2024-05-23 NOTE — ASSESSMENT & PLAN NOTE
Borderline controlled depends on the day, denies any suicidal ideations. She was changed from Celexa to Cymbalta due to her back pain and Dr Cespedes felt this would help. Discussed weaning down on the Cymbalta as she isn't sure it is helping with her pain and she declines at this time. Offered referral to psychiatry for adjunctive therapy and she will discuss with her  so at this time continue the Cymbalta 60 mg daily.

## 2024-05-23 NOTE — PROGRESS NOTES
OFFICE PROGRESS NOTE  [facility]  [address] [place] [address]  Dept: [phone]   Chief Complaint   Patient presents with    Anxiety    Depression    Health Maintenance       ASSESSMENT/PLAN   1. Primary hypertension  Assessment & Plan:  well controlled, no significant side effects from medication noted.Continue atenolol 50 mg daily labs reviewed:CMP, CBCD, Lipids,fasting labs ordered for 2 weeks   -Discussed taking medication as directed every day around the same time. Do not double up if skipped or missed doses.   -Discussed exercising daily 30 minutes 5 times a week for 150 minutes weekly to help with stress reduction and weight reduction if needed.  -Discussed low sodium diet.  -Discussed limiting caffeine consumption and tobacco cessation and the effects they have on the heart and blood pressure.  Orders:  -     atenolol (TENORMIN) 50 MG tablet; Take 1 tablet by mouth at bedtime, Disp-90 tablet, R-1Normal  -     CBC with Auto Differential; Future  -     Comprehensive Metabolic Panel; Future  -     Lipid Panel; Future  2. Current moderate episode of major depressive disorder without prior episode (HCC)  Assessment & Plan:  Borderline controlled depends on the day, denies any suicidal ideations. She was changed from Celexa to Cymbalta due to her back pain and Dr Cespedes felt this would help. Discussed weaning down on the Cymbalta as she isn't sure it is helping with her pain and she declines at this time. Offered referral to psychiatry for adjunctive therapy and she will discuss with her  so at this time continue the Cymbalta 60 mg daily.   3. Positive depression screening  4. Back pain with right-sided radiculopathy  Assessment & Plan:  Make appt with Dr Cespedes for follow up  5. Impaired fasting glucose  -     Hemoglobin A1C; Future       Reviewed labs: CMP,  Reviewed notes from   Reviewed radiology     Discussed weight loss, Discussed exercising

## 2024-05-24 ENCOUNTER — OFFICE VISIT (OUTPATIENT)
Dept: PHYSICAL MEDICINE AND REHAB | Age: 59
End: 2024-05-24
Payer: COMMERCIAL

## 2024-05-24 ENCOUNTER — TELEPHONE (OUTPATIENT)
Dept: PHYSICAL MEDICINE AND REHAB | Age: 59
End: 2024-05-24

## 2024-05-24 VITALS
BODY MASS INDEX: 44.39 KG/M2 | HEART RATE: 66 BPM | WEIGHT: 260 LBS | SYSTOLIC BLOOD PRESSURE: 112 MMHG | HEIGHT: 64 IN | DIASTOLIC BLOOD PRESSURE: 71 MMHG

## 2024-05-24 DIAGNOSIS — M47.816 FACET ARTHROPATHY, LUMBAR: Primary | ICD-10-CM

## 2024-05-24 DIAGNOSIS — N39.492 POSTURAL URINARY INCONTINENCE: ICD-10-CM

## 2024-05-24 DIAGNOSIS — N39.492 POSTURAL URINARY INCONTINENCE: Primary | ICD-10-CM

## 2024-05-24 PROCEDURE — 3074F SYST BP LT 130 MM HG: CPT | Performed by: PHYSICAL MEDICINE & REHABILITATION

## 2024-05-24 PROCEDURE — 3078F DIAST BP <80 MM HG: CPT | Performed by: PHYSICAL MEDICINE & REHABILITATION

## 2024-05-24 PROCEDURE — 99214 OFFICE O/P EST MOD 30 MIN: CPT | Performed by: PHYSICAL MEDICINE & REHABILITATION

## 2024-05-24 RX ORDER — CITALOPRAM 40 MG/1
40 TABLET ORAL DAILY
Qty: 90 TABLET | Refills: 0 | Status: SHIPPED | OUTPATIENT
Start: 2024-05-24 | End: 2024-08-22

## 2024-05-24 NOTE — TELEPHONE ENCOUNTER
Patient called back and states she does not want the referral for gyno but fir a urologist , female preference but does not matter. She is asking if you give her the referral who you recommend. Please advise.

## 2024-05-24 NOTE — PROGRESS NOTES
Valery Cespedes D.O.  [facility]  [address] [place] [address]  Phone: [phone]  Fax: [phone]    Chief Complaint   Patient presents with    Back Pain       An independent historian was not needed.    Interval history: Patient had bilateral L4-5, L5-S1 RFAs in February and March and she reports she feels no better.  She feels no improvement in her pain and also feels her mood is worse with Cymbalta.   Patient reports that over the last few weeks when she stands up to go to the bathroom she is leaking urine without knowing it.  She has the urge to go to the bathroom but does not make it in time. The incontinence is coming and going.  No bowel incontinence.     Symptoms have been worsening.      Today, the location of pain is low back pain radiating to the right leg and the severity is Pain Score:   5.     The quality is stiff, numb/tingling.      The frequency is episodic daily.     Alleviating factors include: resting    Exacerbating factors include:  gardening, prolonged sitting    Functional status:   ADL: Self-care  Mobility: independence Device: None    Exercise: never    Red flags: Not present: history of cancer, pain awakening patient from sleep, night sweats, fevers, unintentional weight loss, immunospuression, saddle anesthesia, new bowel dysfunction, and osteoporosis.       Associated symptoms: Not present: falls, subjective weakness, hematuria, nausea, vomiting or rash. Present: paresthesias    Data reviewed: MBB procedure note.     Past medical, surgical, family, social history, allergies and medications were otherwise reviewed in Epic.      Physical Exam:   Blood pressure 112/71, pulse 66, height 1.626 m (5' 4\"), weight 117.9 kg (260 lb), not currently breastfeeding.   General: No apparent distress.   Habitus: Body mass index is 44.63 kg/m². Class III obesity (BMI = 40.0-49.9)   MSK: Lumbar paraspinal tenderness is present bilaterally worse on

## 2024-05-24 NOTE — TELEPHONE ENCOUNTER
Called patient to find out which gynecologist she wanted referral to go to  will await call back from patient.

## 2024-05-28 ENCOUNTER — TELEPHONE (OUTPATIENT)
Dept: PHYSICAL MEDICINE AND REHAB | Age: 59
End: 2024-05-28

## 2024-05-28 ENCOUNTER — PREP FOR PROCEDURE (OUTPATIENT)
Dept: PHYSICAL MEDICINE AND REHAB | Age: 59
End: 2024-05-28

## 2024-05-28 NOTE — TELEPHONE ENCOUNTER
Called and spoke to Jackelin RAHMAN [employer] insurance and no prior auth required for CPT codes 31005,30566, 91915  call reference number [id number]

## 2024-05-29 RX ORDER — SODIUM CHLORIDE 0.9 % (FLUSH) 0.9 %
5-40 SYRINGE (ML) INJECTION EVERY 12 HOURS SCHEDULED
Status: CANCELLED | OUTPATIENT
Start: 2024-05-29

## 2024-05-29 RX ORDER — SODIUM CHLORIDE 0.9 % (FLUSH) 0.9 %
5-40 SYRINGE (ML) INJECTION PRN
Status: CANCELLED | OUTPATIENT
Start: 2024-05-29

## 2024-05-29 RX ORDER — SODIUM CHLORIDE 9 MG/ML
INJECTION, SOLUTION INTRAVENOUS PRN
Status: CANCELLED | OUTPATIENT
Start: 2024-05-29

## 2024-05-30 ENCOUNTER — HOSPITAL ENCOUNTER (OUTPATIENT)
Dept: OPERATING ROOM | Age: 59
Setting detail: OUTPATIENT SURGERY
Discharge: HOME | End: 2024-05-30
Attending: PHYSICAL MEDICINE & REHABILITATION
Payer: COMMERCIAL

## 2024-05-30 ENCOUNTER — HOSPITAL ENCOUNTER (OUTPATIENT)
Age: 59
Setting detail: OUTPATIENT SURGERY
Discharge: HOME | End: 2024-05-30
Attending: PHYSICAL MEDICINE & REHABILITATION | Admitting: PHYSICAL MEDICINE & REHABILITATION
Payer: COMMERCIAL

## 2024-05-30 VITALS
HEART RATE: 61 BPM | HEIGHT: 64 IN | TEMPERATURE: 98.6 F | OXYGEN SATURATION: 97 % | BODY MASS INDEX: 44.39 KG/M2 | SYSTOLIC BLOOD PRESSURE: 119 MMHG | WEIGHT: 260 LBS | DIASTOLIC BLOOD PRESSURE: 56 MMHG | RESPIRATION RATE: 16 BRPM

## 2024-05-30 DIAGNOSIS — M47.816 FACET ARTHROPATHY, LUMBAR: ICD-10-CM

## 2024-05-30 PROCEDURE — 6360000002 HC RX W HCPCS: Performed by: PHYSICAL MEDICINE & REHABILITATION

## 2024-05-30 PROCEDURE — 2709999900 HC NON-CHARGEABLE SUPPLY: Performed by: PHYSICAL MEDICINE & REHABILITATION

## 2024-05-30 PROCEDURE — 64493 INJ PARAVERT F JNT L/S 1 LEV: CPT | Performed by: PHYSICAL MEDICINE & REHABILITATION

## 2024-05-30 PROCEDURE — 7100000011 HC PHASE II RECOVERY - ADDTL 15 MIN: Performed by: PHYSICAL MEDICINE & REHABILITATION

## 2024-05-30 PROCEDURE — 64495 INJ PARAVERT F JNT L/S 3 LEV: CPT | Performed by: PHYSICAL MEDICINE & REHABILITATION

## 2024-05-30 PROCEDURE — 2500000003 HC RX 250 WO HCPCS: Performed by: PHYSICAL MEDICINE & REHABILITATION

## 2024-05-30 PROCEDURE — 3600000005 HC SURGERY LEVEL 5 BASE: Performed by: PHYSICAL MEDICINE & REHABILITATION

## 2024-05-30 PROCEDURE — 64494 INJ PARAVERT F JNT L/S 2 LEV: CPT | Performed by: PHYSICAL MEDICINE & REHABILITATION

## 2024-05-30 PROCEDURE — 7100000010 HC PHASE II RECOVERY - FIRST 15 MIN: Performed by: PHYSICAL MEDICINE & REHABILITATION

## 2024-05-30 RX ORDER — SODIUM CHLORIDE 9 MG/ML
INJECTION, SOLUTION INTRAVENOUS PRN
Status: DISCONTINUED | OUTPATIENT
Start: 2024-05-30 | End: 2024-05-30 | Stop reason: HOSPADM

## 2024-05-30 RX ORDER — SODIUM CHLORIDE 0.9 % (FLUSH) 0.9 %
5-40 SYRINGE (ML) INJECTION PRN
Status: DISCONTINUED | OUTPATIENT
Start: 2024-05-30 | End: 2024-05-30 | Stop reason: HOSPADM

## 2024-05-30 RX ORDER — LIDOCAINE HYDROCHLORIDE 10 MG/ML
INJECTION, SOLUTION EPIDURAL; INFILTRATION; INTRACAUDAL; PERINEURAL PRN
Status: DISCONTINUED | OUTPATIENT
Start: 2024-05-30 | End: 2024-05-30 | Stop reason: ALTCHOICE

## 2024-05-30 RX ORDER — BUPIVACAINE HYDROCHLORIDE 2.5 MG/ML
INJECTION, SOLUTION EPIDURAL; INFILTRATION; INTRACAUDAL PRN
Status: DISCONTINUED | OUTPATIENT
Start: 2024-05-30 | End: 2024-05-30 | Stop reason: ALTCHOICE

## 2024-05-30 RX ORDER — SODIUM CHLORIDE 0.9 % (FLUSH) 0.9 %
5-40 SYRINGE (ML) INJECTION EVERY 12 HOURS SCHEDULED
Status: DISCONTINUED | OUTPATIENT
Start: 2024-05-30 | End: 2024-05-30 | Stop reason: HOSPADM

## 2024-05-30 ASSESSMENT — PAIN - FUNCTIONAL ASSESSMENT
PAIN_FUNCTIONAL_ASSESSMENT: 0-10

## 2024-05-30 ASSESSMENT — PAIN DESCRIPTION - DESCRIPTORS: DESCRIPTORS: ACHING

## 2024-05-30 NOTE — OP NOTE
Yelitza Paz    5/30/2024     CHIEF COMPLAINT:  Low back pain.    PRE-OPERATIVE DIAGNOSIS:  Lumbar spondylosis, lumbar facet syndrome, lumbosacral osteoarthritis     POST-OPERATIVE DIAGNOSIS:  Lumbar spondylosis, lumbar facet syndrome, lumbosacral osteoarthritis     PROCEDURE:  # 1 Fluoroscopic guided lumbar medial branch blocks at  levels: medial branch level: T12, L1, L2, L3 Joint: Bilateral L1-2, L2-3, L3-4    SURGEON:    Avelina Marina, DO    ANESTHESIA:   Local.    ESTIMATED BLOOD LOSS:  None.  ______________________________________________________________________  HISTORY & PHYSICAL: See separate H&P.     PROCEDURE:  After confirming written and informed consent, Yelitza Paz was brought to the procedure room and placed in the prone position.  Blood pressure, heart rate, O2 saturation, and visual and verbal monitoring were established.   A time-out was performed and Yelitza’s name, date of birth, the procedure to be performed, as well as the plan for the location of the needle insertion were confirmed.      Fluoroscopy was utilized to delineate the anatomy of the lumbosacral spine. Surface landmarks were identified as well.  After prep and drape, an oblique fluoroscopic view was created to optimize visualization of the junction of the transverse process and the pedicle.    After infiltration of local, a #22-gauge, 3.5-inch regional block needle was passed to position the tip at the junction of the superior articular process and the transverse process, along the course of the medial branch.  Satisfactory needle placement was confirmed by AP and oblique projections.At each level the patient received 0.75 cc of 0.25% Marcaine.    The above procedure was performed at each of the following levels: Bilateral L1-2, L2-3, L3-4.    Negative aspiration was noted prior to each injection.  The needles were removed intact and Band-Aids were applied.    Yelitza was transferred to the recovery area.  She

## 2024-05-30 NOTE — DISCHARGE INSTRUCTIONS
Post-op instruction Block  Dr. Marina  [phone]      Rest 12-24 hours following procedure. DO NOT DRIVE till the following day.    Keep dressing clean and dry. Do not bathe, shower, or sit in hot tub the day of procedure .You may remove the dressing following A.M.    You may return to work/school tomorrow.    Drink extra fluids for the next 24 hours.    Resume your regular diet.    Resume previously prescribed medications. Resume Coumadin, Plavix, NSAIDS, Ibuprofen products 24 hours after procedure.    You need to have a responsible adult stay with you this evening.    If you experience any discomfort relating to this procedure, you may take Tylenol 2 tablets every 4 hrs as needed for pain and/or apply ice to the affected area.    Please follow up with Dr. Marina or Dr. Cespedes. If you were a hip injection follow up with your orthopedic Doctor.    If you experience any unusual symptoms or problems, call your physician’s answering service at [phone] or go directly to [facility]’s Emergency Department.                   Infection After Surgery: Care Instructions  Overview  After surgery, an infection is always possible. It doesn't mean that the surgery didn't go well.  Because an infection can be serious, your doctor has taken steps to manage it.  Your doctor checked the infection and cleaned it if necessary. Your doctor may have made an opening in the area so that the pus can drain out. You may have gauze in the cut so that the area will stay open and keep draining. You may need antibiotics.  You will need to follow up with your doctor to make sure the infection has gone away.  Follow-up care is a key part of your treatment and safety. Be sure to make and go to all appointments, and call your doctor if you are having problems. It's also a good idea to know your test results and keep a list of the medicines you take.  How can you care for yourself at home?  Make sure your

## 2024-05-30 NOTE — H&P
Avelina Marina DO  [facility]  [address] [place] [address]  Phone: [phone]  Fax: [phone]    PCP: Sandi Krueger APRN - CNP  Date of visit: 5/30/2024    CC: low back pain       Yelitza Paz is a 58 y.o. female who presents today for medial branch blocks.  No red flag symptoms. Consents to proceed with procedure.     Allergies   Allergen Reactions    Pcn [Penicillins] Hives     Unsure  As a child    Ace Inhibitors Other (See Comments)     unknown       Current Facility-Administered Medications   Medication Dose Route Frequency Provider Last Rate Last Admin    sodium chloride flush 0.9 % injection 5-40 mL  5-40 mL IntraVENous 2 times per day Avelina Marina DO        sodium chloride flush 0.9 % injection 5-40 mL  5-40 mL IntraVENous PRN Avelina Marina DO        0.9 % sodium chloride infusion   IntraVENous PRN Avelina Marina DO           Past Medical History:   Diagnosis Date    Allergic rhinitis     Anxiety October 2022    Arthritis     Back pain with right-sided radiculopathy 12/21/2023    Back pain with right-sided radiculopathy 12/21/2023    Depression     Headache     Hearing loss Hearing issues for half dozen years or more    HTN (hypertension)     Hyperlipidemia 08/14/2013    Leg pain     Obesity Always been overweight    Osteoarthritis In the last month all my joints hurt    Seasonal allergies     Sleep apnea Very loud snorer and do not feel rested in the morning    CPAP    Urinary incontinence If I drink caffeine I have a problem going to the bathroom a lot and not much notice       Past Surgical History:   Procedure Laterality Date    CARPAL TUNNEL RELEASE Left 03/08/2022    left wrist carpal tunnel release performed by Vincenzo Luke DO at [facility] OR    CHOLECYSTECTOMY      COLONOSCOPY      DILATION AND CURETTAGE OF UTERUS      HAND TENDON SURGERY Right 2024    Dr Arce    JOINT

## 2024-06-03 ENCOUNTER — TELEPHONE (OUTPATIENT)
Dept: PHYSICAL MEDICINE AND REHAB | Age: 59
End: 2024-06-03

## 2024-06-03 NOTE — TELEPHONE ENCOUNTER
Patient had her 1st MBB on 5-30-24    Medial Branch Block Pain Diary    Procedure Date: 5-30-24    Pre-procedure pain level: 6   Post-procedure pain level: 6    Using the 0-10 pain scale, (zero being no pain and 10 being extreme pain) rate your pain every 30 minutes for the next four hours while doing these activities.  Bending  6  Walking  4  Stretching  4  Lifting   6      For the next four days after your injection, please rate your pain level once per day using the 0-10 pain scale.   Day 2: 6   Day 3: 6   Day 4: 6   Day 5: 6    Which of the following words describe your current pain:  [x] Aching  [x] Dull  [x] Constant  [] Numbing  [] Coldness  [] Burning [] Sharp  [] Stinging  [] Cramping  [] Radiating [] Stabbing  [] Tingling      Please advise.

## 2024-06-04 NOTE — PROGRESS NOTES
Valery Cespedes D.O.  [facility]  [address] [place] [address]  Phone: [phone]  Fax: [phone]    Chief Complaint   Patient presents with    Back Pain     F/u MBB wanted to be seen, 5/30/24       An independent historian was not needed.    Interval history: Patient had bilateral L1-2, L2-3, L3-4 MBB on 5/30/24 with Dr. Marina and had 0% relief.    She reports the pain feels lower down in her low back and into the buttock now and is not radiating. The muscle relaxer helped her back pain at hs.     Symptoms have been worsening.      Today, the location of pain is low back and left greater than right buttock and the severity is Pain Score:   5.     The quality is stiff, numb/tingling.      The frequency is episodic daily.     Alleviating factors include: resting    Exacerbating factors include:  gardening, prolonged sitting    Functional status:   ADL: Self-care  Mobility: independence Device: None    Exercise: never    Red flags: Not present: history of cancer, pain awakening patient from sleep, night sweats, fevers, unintentional weight loss, immunospuression, saddle anesthesia, new bowel dysfunction, and osteoporosis.       Associated symptoms: Not present: falls, subjective weakness, hematuria, nausea, vomiting or rash. Present: paresthesias    Data reviewed: MBB procedure note.     Past medical, surgical, family, social history, allergies and medications were otherwise reviewed in Epic.      Physical Exam:   Blood pressure 132/79, pulse 69, height 1.626 m (5' 4\"), weight 117.5 kg (259 lb), not currently breastfeeding.   General: No apparent distress.   Habitus: Body mass index is 44.46 kg/m². Class III obesity (BMI = 40.0-49.9)   MSK: Lumbar paraspinal tenderness is present bilaterally worse on right. SLR is negative. AROM is full but pain increases in lumbar extension. Tender left greater than right SI joint. +L Gillet.    Neurologic:  No

## 2024-06-05 DIAGNOSIS — R73.01 IMPAIRED FASTING GLUCOSE: ICD-10-CM

## 2024-06-05 DIAGNOSIS — I10 PRIMARY HYPERTENSION: ICD-10-CM

## 2024-06-05 LAB
ALBUMIN: 4.4 G/DL (ref 3.5–5.2)
ALT SERPL-CCNC: 21 U/L (ref 0–32)
ANION GAP SERPL CALCULATED.3IONS-SCNC: 12 MMOL/L (ref 7–16)
AST SERPL-CCNC: 29 U/L (ref 0–31)
BASOPHILS ABSOLUTE: 0.03 K/UL (ref 0–0.2)
BASOPHILS RELATIVE PERCENT: 1 % (ref 0–2)
BILIRUB SERPL-MCNC: 0.6 MG/DL (ref 0–1.2)
BUN BLDV-MCNC: 15 MG/DL (ref 6–20)
CALCIUM SERPL-MCNC: 9.2 MG/DL (ref 8.6–10.2)
CHLORIDE BLD-SCNC: 102 MMOL/L (ref 98–107)
CHOLESTEROL, TOTAL: 156 MG/DL
CO2: 26 MMOL/L (ref 22–29)
CREAT SERPL-MCNC: 0.9 MG/DL (ref 0.5–1)
EOSINOPHILS ABSOLUTE: 0.19 K/UL (ref 0.05–0.5)
EOSINOPHILS RELATIVE PERCENT: 3 % (ref 0–6)
GFR, ESTIMATED: 79 ML/MIN/1.73M2
GLUCOSE BLD-MCNC: 79 MG/DL (ref 74–99)
HBA1C MFR BLD: 6 % (ref 4–5.6)
HCT VFR BLD CALC: 38.4 % (ref 34–48)
HDLC SERPL-MCNC: 46 MG/DL
HEMOGLOBIN: 11.8 G/DL (ref 11.5–15.5)
IMMATURE GRANULOCYTES %: 0 % (ref 0–5)
IMMATURE GRANULOCYTES ABSOLUTE: <0.03 K/UL (ref 0–0.58)
LDL CHOLESTEROL: 98 MG/DL
LYMPHOCYTES ABSOLUTE: 2.02 K/UL (ref 1.5–4)
LYMPHOCYTES RELATIVE PERCENT: 36 % (ref 20–42)
MCH RBC QN AUTO: 30.8 PG (ref 26–35)
MCHC RBC AUTO-ENTMCNC: 30.7 G/DL (ref 32–34.5)
MCV RBC AUTO: 100.3 FL (ref 80–99.9)
MONOCYTES ABSOLUTE: 0.48 K/UL (ref 0.1–0.95)
MONOCYTES RELATIVE PERCENT: 9 % (ref 2–12)
NEUTROPHILS ABSOLUTE: 2.84 K/UL (ref 1.8–7.3)
NEUTROPHILS RELATIVE PERCENT: 51 % (ref 43–80)
PDW BLD-RTO: 14.4 % (ref 11.5–15)
PLATELET # BLD: 269 K/UL (ref 130–450)
PMV BLD AUTO: 10 FL (ref 7–12)
POTASSIUM SERPL-SCNC: 4.5 MMOL/L (ref 3.5–5)
RBC # BLD: 3.83 M/UL (ref 3.5–5.5)
SODIUM BLD-SCNC: 140 MMOL/L (ref 132–146)
TOTAL PROTEIN: 7.6 G/DL (ref 6.4–8.3)
TRIGL SERPL-MCNC: 58 MG/DL
VLDLC SERPL CALC-MCNC: 12 MG/DL
WBC # BLD: 5.6 K/UL (ref 4.5–11.5)

## 2024-06-14 DIAGNOSIS — Z79.2 PROPHYLACTIC ANTIBIOTIC: ICD-10-CM

## 2024-06-14 RX ORDER — CEPHALEXIN 500 MG/1
2000 CAPSULE ORAL DAILY PRN
Qty: 4 CAPSULE | Refills: 3 | Status: SHIPPED | OUTPATIENT
Start: 2024-06-14

## 2024-06-17 ENCOUNTER — OFFICE VISIT (OUTPATIENT)
Dept: SLEEP CENTER | Age: 59
End: 2024-06-17
Payer: COMMERCIAL

## 2024-06-17 VITALS
HEIGHT: 64 IN | HEART RATE: 81 BPM | WEIGHT: 259.7 LBS | SYSTOLIC BLOOD PRESSURE: 109 MMHG | OXYGEN SATURATION: 95 % | DIASTOLIC BLOOD PRESSURE: 62 MMHG | BODY MASS INDEX: 44.34 KG/M2 | RESPIRATION RATE: 16 BRPM

## 2024-06-17 DIAGNOSIS — G47.33 OBSTRUCTIVE SLEEP APNEA: Primary | ICD-10-CM

## 2024-06-17 PROCEDURE — 3078F DIAST BP <80 MM HG: CPT | Performed by: NURSE PRACTITIONER

## 2024-06-17 PROCEDURE — 99213 OFFICE O/P EST LOW 20 MIN: CPT | Performed by: NURSE PRACTITIONER

## 2024-06-17 PROCEDURE — 3074F SYST BP LT 130 MM HG: CPT | Performed by: NURSE PRACTITIONER

## 2024-06-17 ASSESSMENT — SLEEP AND FATIGUE QUESTIONNAIRES
HOW LIKELY ARE YOU TO NOD OFF OR FALL ASLEEP WHEN YOU ARE A PASSENGER IN A CAR FOR AN HOUR WITHOUT A BREAK: HIGH CHANCE OF DOZING
HOW LIKELY ARE YOU TO NOD OFF OR FALL ASLEEP WHILE SITTING INACTIVE IN A PUBLIC PLACE: WOULD NEVER DOZE
HOW LIKELY ARE YOU TO NOD OFF OR FALL ASLEEP IN A CAR, WHILE STOPPED FOR A FEW MINUTES IN TRAFFIC: WOULD NEVER DOZE
HOW LIKELY ARE YOU TO NOD OFF OR FALL ASLEEP WHILE LYING DOWN TO REST IN THE AFTERNOON WHEN CIRCUMSTANCES PERMIT: HIGH CHANCE OF DOZING
ESS TOTAL SCORE: 15
HOW LIKELY ARE YOU TO NOD OFF OR FALL ASLEEP WHILE SITTING AND TALKING TO SOMEONE: WOULD NEVER DOZE
HOW LIKELY ARE YOU TO NOD OFF OR FALL ASLEEP WHILE SITTING AND READING: HIGH CHANCE OF DOZING
HOW LIKELY ARE YOU TO NOD OFF OR FALL ASLEEP WHILE SITTING QUIETLY AFTER LUNCH WITHOUT ALCOHOL: HIGH CHANCE OF DOZING
HOW LIKELY ARE YOU TO NOD OFF OR FALL ASLEEP WHILE WATCHING TV: HIGH CHANCE OF DOZING

## 2024-06-17 NOTE — PROGRESS NOTES
[facility]    Patient Name: Yelitza Paz  Age: 58 y.o.   : 1965    Date of Visit: 24        Review of Last Visit Summary:    The patient was last seen on 2024 for  Obstructive Sleep Apnea.    Interim History:     Yelitza Paz is a 58 y.o. female that  has a past medical history of Allergic rhinitis, Anxiety (2022), Arthritis, Back pain with right-sided radiculopathy (2023), Back pain with right-sided radiculopathy (2023), Depression, Headache, Hearing loss (Hearing issues for half dozen years or more), HTN (hypertension), Hyperlipidemia (2013), Leg pain, Obesity (Always been overweight), Osteoarthritis (In the last month all my joints hurt), Seasonal allergies, Sleep apnea (Very loud snorer and do not feel rested in the morning), and Urinary incontinence (If I drink caffeine I have a problem going to the bathroom a lot and not much notice). She presents in follow up to Sleep Clinic to review CPAP adherence and efficacy.     Interval Events:    -Started AUTO CPAP in 2024 following HST showing severe JUNG, AHI of 46. She is continuing to adjust to CPAP,  notes significant benefit with use.   -This last month, she has been experiencing increased back pain, so she will take the CPAP off after 3 to 4 hours and transfer sleep to the recliner chair.  She notes when she sleeps on her side the mask leaks more.  -Uses a fullface mask.  -Cleans CPAP regularly.  Receives supplies regularly.  -Notes benefit with use.  In terms of her quality of sleep and energy.  -Notes need for increased humidity.    DME:[facility]    Sleep Study History: 23- HST- wt 244 lbs- AHI 46, REM RDI 62, supine RDI 73, SpO2 chelle 80%, T<88% was 8.3% of total O2 recording time.    Sleep History:  Patient presents to sleep medicine for management of JUNG.  She recently had a home sleep study in December showing a severe degree of JUNG, AHI 46.  This was her first sleep study.

## 2024-06-24 ENCOUNTER — OFFICE VISIT (OUTPATIENT)
Dept: PHYSICAL MEDICINE AND REHAB | Age: 59
End: 2024-06-24
Payer: COMMERCIAL

## 2024-06-24 VITALS
BODY MASS INDEX: 44.22 KG/M2 | WEIGHT: 259 LBS | SYSTOLIC BLOOD PRESSURE: 132 MMHG | HEART RATE: 69 BPM | HEIGHT: 64 IN | DIASTOLIC BLOOD PRESSURE: 79 MMHG

## 2024-06-24 DIAGNOSIS — M53.3 SACROILIAC JOINT DYSFUNCTION OF LEFT SIDE: Primary | ICD-10-CM

## 2024-06-24 PROCEDURE — 99214 OFFICE O/P EST MOD 30 MIN: CPT | Performed by: PHYSICAL MEDICINE & REHABILITATION

## 2024-06-24 PROCEDURE — 3078F DIAST BP <80 MM HG: CPT | Performed by: PHYSICAL MEDICINE & REHABILITATION

## 2024-06-24 PROCEDURE — 3075F SYST BP GE 130 - 139MM HG: CPT | Performed by: PHYSICAL MEDICINE & REHABILITATION

## 2024-06-24 RX ORDER — CYCLOBENZAPRINE HCL 10 MG
10 TABLET ORAL NIGHTLY PRN
Qty: 90 TABLET | Refills: 3 | Status: SHIPPED | OUTPATIENT
Start: 2024-06-24 | End: 2025-03-21

## 2024-06-26 ENCOUNTER — TELEPHONE (OUTPATIENT)
Dept: PHYSICAL MEDICINE AND REHAB | Age: 59
End: 2024-06-26

## 2024-06-26 NOTE — TELEPHONE ENCOUNTER
----- Message from Valery Cespedes DO sent at 6/26/2024  3:07 PM EDT -----  Reviewed test abnormal, inform patient that it showed degenerative changes in SI joints. We can proceed with injection that we discussed if she wants.

## 2024-06-26 NOTE — TELEPHONE ENCOUNTER
Called patient with x ray results  would like to proceed with SI joint injections, scheduled left SI joint injection tomorrow June 27 2024

## 2024-06-27 ENCOUNTER — OFFICE VISIT (OUTPATIENT)
Dept: PHYSICAL MEDICINE AND REHAB | Age: 59
End: 2024-06-27
Payer: COMMERCIAL

## 2024-06-27 VITALS
BODY MASS INDEX: 44.46 KG/M2 | HEART RATE: 76 BPM | SYSTOLIC BLOOD PRESSURE: 113 MMHG | TEMPERATURE: 97.5 F | WEIGHT: 259 LBS | DIASTOLIC BLOOD PRESSURE: 72 MMHG

## 2024-06-27 DIAGNOSIS — M53.3 SACROILIAC JOINT DYSFUNCTION OF LEFT SIDE: ICD-10-CM

## 2024-06-27 DIAGNOSIS — M79.609 PAIN IN EXTREMITY, UNSPECIFIED EXTREMITY: Primary | ICD-10-CM

## 2024-06-27 PROCEDURE — 20552 NJX 1/MLT TRIGGER POINT 1/2: CPT | Performed by: PHYSICAL MEDICINE & REHABILITATION

## 2024-06-27 RX ORDER — TRIAMCINOLONE ACETONIDE 40 MG/ML
40 INJECTION, SUSPENSION INTRA-ARTICULAR; INTRAMUSCULAR ONCE
Status: COMPLETED | OUTPATIENT
Start: 2024-06-27 | End: 2024-06-27

## 2024-06-27 RX ORDER — LIDOCAINE HYDROCHLORIDE 10 MG/ML
3 INJECTION, SOLUTION EPIDURAL; INFILTRATION; INTRACAUDAL; PERINEURAL ONCE
Status: SHIPPED | OUTPATIENT
Start: 2024-06-27

## 2024-06-27 RX ORDER — LIDOCAINE HYDROCHLORIDE 10 MG/ML
3 INJECTION, SOLUTION INFILTRATION; PERINEURAL ONCE
Status: COMPLETED | OUTPATIENT
Start: 2024-06-27 | End: 2024-06-27

## 2024-06-27 RX ADMIN — TRIAMCINOLONE ACETONIDE 40 MG: 40 INJECTION, SUSPENSION INTRA-ARTICULAR; INTRAMUSCULAR at 10:34

## 2024-06-27 RX ADMIN — LIDOCAINE HYDROCHLORIDE 3 ML: 10 INJECTION, SOLUTION INFILTRATION; PERINEURAL at 10:36

## 2024-06-27 NOTE — PROGRESS NOTES
VANNA Smith    Ordering Provider: Valery Cespedes, DO    Patient Supplied?: No              triamcinolone acetonide (KENALOG-40) injection 40 mg       Admin Date  06/27/2024  10:34 Action  Given Dose  40 mg Route  IntraMUSCular Site  Other Administered By  Sarah Bass MA    Ordering Provider: Valery Cespedes, DO    Patient Supplied?: No

## 2024-07-09 ENCOUNTER — TELEPHONE (OUTPATIENT)
Dept: VASCULAR SURGERY | Age: 59
End: 2024-07-09

## 2024-07-20 DIAGNOSIS — I10 PRIMARY HYPERTENSION: ICD-10-CM

## 2024-07-22 RX ORDER — METHOTREXATE 2.5 MG/1
TABLET ORAL
Qty: 76 TABLET | OUTPATIENT
Start: 2024-07-22

## 2024-07-22 RX ORDER — ATENOLOL 50 MG/1
50 TABLET ORAL NIGHTLY
Qty: 90 TABLET | Refills: 1 | OUTPATIENT
Start: 2024-07-22

## 2024-07-23 RX ORDER — DULOXETIN HYDROCHLORIDE 60 MG/1
CAPSULE, DELAYED RELEASE ORAL DAILY
Qty: 90 CAPSULE | OUTPATIENT
Start: 2024-07-23

## 2025-01-02 RX ORDER — CELECOXIB 200 MG/1
CAPSULE ORAL DAILY
Qty: 90 CAPSULE | Refills: 1 | OUTPATIENT
Start: 2025-01-02

## 2025-01-03 RX ORDER — HYDROXYCHLOROQUINE SULFATE 200 MG/1
400 TABLET, FILM COATED ORAL DAILY
Qty: 180 TABLET | OUTPATIENT
Start: 2025-01-03

## 2025-01-03 RX ORDER — METHOTREXATE 2.5 MG/1
TABLET ORAL
Qty: 96 TABLET | OUTPATIENT
Start: 2025-01-03

## 2025-01-23 DIAGNOSIS — I10 PRIMARY HYPERTENSION: ICD-10-CM

## 2025-01-23 RX ORDER — ATENOLOL 50 MG/1
50 TABLET ORAL
Qty: 90 TABLET | Refills: 0 | Status: SHIPPED | OUTPATIENT
Start: 2025-01-23

## 2025-01-23 NOTE — TELEPHONE ENCOUNTER
Name of Medication(s) Requested:  Requested Prescriptions     Pending Prescriptions Disp Refills    atenolol (TENORMIN) 50 MG tablet [Pharmacy Med Name: ATENOLOL 50 MG TABLET] 90 tablet 0     Sig: TAKE 1 TABLET BY MOUTH EVERYDAY AT BEDTIME       Medication is on current medication list Yes    Dosage and directions were verified? Yes    Quantity verified: 90 day supply     Pharmacy Verified?  Yes    Last Appointment:  5/23/2024    Future appts:  Future Appointments   Date Time Provider Department Center   2/13/2025  9:30 AM Gracie Gilmore MD [place] [facility]   6/17/2025 12:40 PM Kandi Khoury, APRN - CNP [facility] SLE[facility]        (If no appt send self scheduling link. .REFILLAPPT)  Scheduling request sent?     [] Yes  [x] No    Does patient need updated?  [] Yes  [x] No

## 2025-01-24 PROBLEM — R76.12 POSITIVE QUANTIFERON-TB GOLD TEST: Status: ACTIVE | Noted: 2025-01-24

## 2025-01-31 ENCOUNTER — HOSPITAL ENCOUNTER (OUTPATIENT)
Age: 60
Discharge: HOME | End: 2025-01-31
Payer: COMMERCIAL

## 2025-01-31 LAB
ALBUMIN SERPL-MCNC: 4.1 G/DL (ref 3.5–5.2)
ALP SERPL-CCNC: 57 U/L (ref 35–104)
ALT SERPL-CCNC: 38 U/L (ref 0–32)
ANION GAP SERPL CALCULATED.3IONS-SCNC: 7 MMOL/L (ref 7–16)
AST SERPL-CCNC: 33 U/L (ref 0–31)
BASOPHILS # BLD: 0.04 K/UL (ref 0–0.2)
BASOPHILS NFR BLD: 1 % (ref 0–2)
BILIRUB SERPL-MCNC: 0.3 MG/DL (ref 0–1.2)
BUN SERPL-MCNC: 15 MG/DL (ref 6–20)
CALCIUM SERPL-MCNC: 9.3 MG/DL (ref 8.6–10.2)
CHLORIDE SERPL-SCNC: 105 MMOL/L (ref 98–107)
CO2 SERPL-SCNC: 29 MMOL/L (ref 22–29)
CREAT SERPL-MCNC: 1.2 MG/DL (ref 0.5–1)
EOSINOPHIL # BLD: 0.26 K/UL (ref 0.05–0.5)
EOSINOPHILS RELATIVE PERCENT: 4 % (ref 0–6)
ERYTHROCYTE [DISTWIDTH] IN BLOOD BY AUTOMATED COUNT: 15 % (ref 11.5–15)
GFR, ESTIMATED: 53 ML/MIN/1.73M2
GLUCOSE SERPL-MCNC: 85 MG/DL (ref 74–99)
HCT VFR BLD AUTO: 37.9 % (ref 34–48)
HGB BLD-MCNC: 11.9 G/DL (ref 11.5–15.5)
IMM GRANULOCYTES # BLD AUTO: <0.03 K/UL (ref 0–0.58)
IMM GRANULOCYTES NFR BLD: 0 % (ref 0–5)
LYMPHOCYTES NFR BLD: 2.29 K/UL (ref 1.5–4)
LYMPHOCYTES RELATIVE PERCENT: 34 % (ref 20–42)
MCH RBC QN AUTO: 32.2 PG (ref 26–35)
MCHC RBC AUTO-ENTMCNC: 31.4 G/DL (ref 32–34.5)
MCV RBC AUTO: 102.7 FL (ref 80–99.9)
MONOCYTES NFR BLD: 0.28 K/UL (ref 0.1–0.95)
MONOCYTES NFR BLD: 4 % (ref 2–12)
NEUTROPHILS NFR BLD: 58 % (ref 43–80)
NEUTS SEG NFR BLD: 3.88 K/UL (ref 1.8–7.3)
PLATELET # BLD AUTO: 269 K/UL (ref 130–450)
PMV BLD AUTO: 9.7 FL (ref 7–12)
POTASSIUM SERPL-SCNC: 4.6 MMOL/L (ref 3.5–5)
PROT SERPL-MCNC: 7.4 G/DL (ref 6.4–8.3)
RBC # BLD AUTO: 3.69 M/UL (ref 3.5–5.5)
SODIUM SERPL-SCNC: 141 MMOL/L (ref 132–146)
WBC OTHER # BLD: 6.8 K/UL (ref 4.5–11.5)

## 2025-01-31 PROCEDURE — 85025 COMPLETE CBC W/AUTO DIFF WBC: CPT

## 2025-01-31 PROCEDURE — 80053 COMPREHEN METABOLIC PANEL: CPT

## 2025-01-31 PROCEDURE — 36415 COLL VENOUS BLD VENIPUNCTURE: CPT

## 2025-02-10 SDOH — HEALTH STABILITY: PHYSICAL HEALTH: ON AVERAGE, HOW MANY MINUTES DO YOU ENGAGE IN EXERCISE AT THIS LEVEL?: 60 MIN

## 2025-02-10 SDOH — HEALTH STABILITY: PHYSICAL HEALTH: ON AVERAGE, HOW MANY DAYS PER WEEK DO YOU ENGAGE IN MODERATE TO STRENUOUS EXERCISE (LIKE A BRISK WALK)?: 3 DAYS

## 2025-02-10 ASSESSMENT — ENCOUNTER SYMPTOMS
APNEA: 0
SINUS PAIN: 0
TROUBLE SWALLOWING: 0
EYE DISCHARGE: 0
SHORTNESS OF BREATH: 0
ABDOMINAL DISTENTION: 0
COUGH: 0
ABDOMINAL PAIN: 0
BLOOD IN STOOL: 0
BACK PAIN: 0
EYE ITCHING: 0
SORE THROAT: 0
WHEEZING: 0
NAUSEA: 0
CONSTIPATION: 0
DIARRHEA: 0
PHOTOPHOBIA: 0
VOMITING: 0

## 2025-02-10 NOTE — PROGRESS NOTES
Yelitza Paz (:  1965) is a 59 y.o. female,New patient, here for evaluation of the following chief complaint(s):  New Patient (Med refills )      Subjective   SUBJECTIVE/OBJECTIVE:  HPI:  New patient    Review of Systems   Constitutional:  Negative for activity change, appetite change, fever and unexpected weight change.   HENT:  Negative for congestion, ear pain, hearing loss, sinus pain, sore throat, tinnitus and trouble swallowing.    Eyes:  Negative for photophobia, discharge, itching and visual disturbance.   Respiratory:  Negative for apnea, cough, shortness of breath and wheezing.    Cardiovascular:  Negative for chest pain, palpitations and leg swelling.   Gastrointestinal:  Negative for abdominal distention, abdominal pain, blood in stool, constipation, diarrhea, nausea and vomiting.   Endocrine: Negative for cold intolerance, polydipsia and polyuria.   Genitourinary:  Negative for difficulty urinating, dysuria, frequency and pelvic pain.   Musculoskeletal:  Negative for arthralgias, back pain, joint swelling, myalgias, neck pain and neck stiffness.   Skin:  Negative for rash and wound.   Neurological:  Negative for dizziness, tremors, syncope, light-headedness and headaches.     CBC with Differential:    Lab Results   Component Value Date/Time    WBC 6.8 2025 02:44 PM    RBC 3.69 2025 02:44 PM    HGB 11.9 2025 02:44 PM    HCT 37.9 2025 02:44 PM     2025 02:44 PM    .7 2025 02:44 PM    MCH 32.2 2025 02:44 PM    MCHC 31.4 2025 02:44 PM    RDW 15.0 2025 02:44 PM    LYMPHOPCT 34 2025 02:44 PM    MONOPCT 4 2025 02:44 PM    EOSPCT 4 2025 02:44 PM    BASOPCT 1 2025 02:44 PM    MONOSABS 0.28 2025 02:44 PM    LYMPHSABS 2.29 2025 02:44 PM    EOSABS 0.26 2025 02:44 PM    BASOSABS 0.04 2025 02:44 PM     CMP:    Lab Results   Component Value Date/Time     2025 02:44 PM    K 4.6

## 2025-02-13 ENCOUNTER — OFFICE VISIT (OUTPATIENT)
Dept: PRIMARY CARE CLINIC | Age: 60
End: 2025-02-13
Payer: COMMERCIAL

## 2025-02-13 VITALS
SYSTOLIC BLOOD PRESSURE: 126 MMHG | DIASTOLIC BLOOD PRESSURE: 86 MMHG | OXYGEN SATURATION: 96 % | HEART RATE: 66 BPM | TEMPERATURE: 97.2 F | BODY MASS INDEX: 42.63 KG/M2 | WEIGHT: 249.7 LBS | HEIGHT: 64 IN

## 2025-02-13 DIAGNOSIS — E78.2 MIXED HYPERLIPIDEMIA: ICD-10-CM

## 2025-02-13 DIAGNOSIS — A15.0 TB (PULMONARY TUBERCULOSIS): ICD-10-CM

## 2025-02-13 DIAGNOSIS — I10 PRIMARY HYPERTENSION: Primary | ICD-10-CM

## 2025-02-13 DIAGNOSIS — E11.65 TYPE 2 DIABETES MELLITUS WITH HYPERGLYCEMIA, WITHOUT LONG-TERM CURRENT USE OF INSULIN (HCC): ICD-10-CM

## 2025-02-13 DIAGNOSIS — F32.9 REACTIVE DEPRESSION: ICD-10-CM

## 2025-02-13 DIAGNOSIS — N18.31 STAGE 3A CHRONIC KIDNEY DISEASE (HCC): ICD-10-CM

## 2025-02-13 DIAGNOSIS — E66.01 MORBID (SEVERE) OBESITY DUE TO EXCESS CALORIES: ICD-10-CM

## 2025-02-13 DIAGNOSIS — Z12.31 ENCOUNTER FOR SCREENING MAMMOGRAM FOR MALIGNANT NEOPLASM OF BREAST: ICD-10-CM

## 2025-02-13 PROBLEM — M17.12 PRIMARY OSTEOARTHRITIS OF ONE KNEE, LEFT: Status: RESOLVED | Noted: 2021-12-01 | Resolved: 2025-02-13

## 2025-02-13 PROBLEM — R20.2 TINGLING OF BOTH FEET: Status: RESOLVED | Noted: 2023-06-08 | Resolved: 2025-02-13

## 2025-02-13 PROBLEM — S66.919A: Status: RESOLVED | Noted: 2024-02-21 | Resolved: 2025-02-13

## 2025-02-13 PROCEDURE — 99214 OFFICE O/P EST MOD 30 MIN: CPT | Performed by: INTERNAL MEDICINE

## 2025-02-13 PROCEDURE — 3079F DIAST BP 80-89 MM HG: CPT | Performed by: INTERNAL MEDICINE

## 2025-02-13 PROCEDURE — 3074F SYST BP LT 130 MM HG: CPT | Performed by: INTERNAL MEDICINE

## 2025-02-13 RX ORDER — ATENOLOL 50 MG/1
50 TABLET ORAL
Qty: 90 TABLET | Refills: 0 | Status: SHIPPED | OUTPATIENT
Start: 2025-02-13

## 2025-02-13 RX ORDER — RIFAMPIN 300 MG/1
300 CAPSULE ORAL 2 TIMES DAILY
COMMUNITY
Start: 2025-02-10

## 2025-02-13 RX ORDER — HYDROXYCHLOROQUINE SULFATE 200 MG/1
400 TABLET, FILM COATED ORAL DAILY
COMMUNITY
Start: 2025-01-20

## 2025-02-13 RX ORDER — CITALOPRAM HYDROBROMIDE 40 MG/1
40 TABLET ORAL DAILY
Qty: 90 TABLET | Refills: 0 | Status: SHIPPED | OUTPATIENT
Start: 2025-02-13 | End: 2025-05-14

## 2025-02-13 SDOH — ECONOMIC STABILITY: FOOD INSECURITY: WITHIN THE PAST 12 MONTHS, YOU WORRIED THAT YOUR FOOD WOULD RUN OUT BEFORE YOU GOT MONEY TO BUY MORE.: NEVER TRUE

## 2025-02-13 SDOH — ECONOMIC STABILITY: FOOD INSECURITY: WITHIN THE PAST 12 MONTHS, THE FOOD YOU BOUGHT JUST DIDN'T LAST AND YOU DIDN'T HAVE MONEY TO GET MORE.: NEVER TRUE

## 2025-02-13 ASSESSMENT — PATIENT HEALTH QUESTIONNAIRE - PHQ9
9. THOUGHTS THAT YOU WOULD BE BETTER OFF DEAD, OR OF HURTING YOURSELF: NOT AT ALL
8. MOVING OR SPEAKING SO SLOWLY THAT OTHER PEOPLE COULD HAVE NOTICED. OR THE OPPOSITE, BEING SO FIGETY OR RESTLESS THAT YOU HAVE BEEN MOVING AROUND A LOT MORE THAN USUAL: NOT AT ALL
1. LITTLE INTEREST OR PLEASURE IN DOING THINGS: NOT AT ALL
5. POOR APPETITE OR OVEREATING: NOT AT ALL
2. FEELING DOWN, DEPRESSED OR HOPELESS: NOT AT ALL
SUM OF ALL RESPONSES TO PHQ QUESTIONS 1-9: 3
6. FEELING BAD ABOUT YOURSELF - OR THAT YOU ARE A FAILURE OR HAVE LET YOURSELF OR YOUR FAMILY DOWN: NOT AT ALL
3. TROUBLE FALLING OR STAYING ASLEEP: NEARLY EVERY DAY
7. TROUBLE CONCENTRATING ON THINGS, SUCH AS READING THE NEWSPAPER OR WATCHING TELEVISION: NOT AT ALL
SUM OF ALL RESPONSES TO PHQ QUESTIONS 1-9: 3
10. IF YOU CHECKED OFF ANY PROBLEMS, HOW DIFFICULT HAVE THESE PROBLEMS MADE IT FOR YOU TO DO YOUR WORK, TAKE CARE OF THINGS AT HOME, OR GET ALONG WITH OTHER PEOPLE: NOT DIFFICULT AT ALL
4. FEELING TIRED OR HAVING LITTLE ENERGY: NOT AT ALL
SUM OF ALL RESPONSES TO PHQ9 QUESTIONS 1 & 2: 0

## 2025-02-26 ENCOUNTER — HOSPITAL ENCOUNTER (OUTPATIENT)
Age: 60
Discharge: HOME | End: 2025-02-26

## 2025-02-26 DIAGNOSIS — E11.65 TYPE 2 DIABETES MELLITUS WITH HYPERGLYCEMIA, WITHOUT LONG-TERM CURRENT USE OF INSULIN (HCC): ICD-10-CM

## 2025-02-26 DIAGNOSIS — E78.2 MIXED HYPERLIPIDEMIA: ICD-10-CM

## 2025-02-26 DIAGNOSIS — N18.31 STAGE 3A CHRONIC KIDNEY DISEASE (HCC): ICD-10-CM

## 2025-02-26 LAB
ALBUMIN SERPL-MCNC: 4.3 G/DL (ref 3.5–5.2)
ALP SERPL-CCNC: 74 U/L (ref 35–104)
ALT SERPL-CCNC: 37 U/L (ref 0–32)
ANION GAP SERPL CALCULATED.3IONS-SCNC: 10 MMOL/L (ref 7–16)
AST SERPL-CCNC: 30 U/L (ref 0–31)
BASOPHILS # BLD: 0.05 K/UL (ref 0–0.2)
BASOPHILS NFR BLD: 1 % (ref 0–2)
BILIRUB SERPL-MCNC: 0.2 MG/DL (ref 0–1.2)
BUN SERPL-MCNC: 14 MG/DL (ref 6–20)
CALCIUM SERPL-MCNC: 9.5 MG/DL (ref 8.6–10.2)
CHLORIDE SERPL-SCNC: 101 MMOL/L (ref 98–107)
CHOLEST SERPL-MCNC: 172 MG/DL
CO2 SERPL-SCNC: 29 MMOL/L (ref 22–29)
CREAT SERPL-MCNC: 1 MG/DL (ref 0.5–1)
EOSINOPHIL # BLD: 0.19 K/UL (ref 0.05–0.5)
EOSINOPHILS RELATIVE PERCENT: 3 % (ref 0–6)
ERYTHROCYTE [DISTWIDTH] IN BLOOD BY AUTOMATED COUNT: 14.7 % (ref 11.5–15)
GFR, ESTIMATED: 69 ML/MIN/1.73M2
GLUCOSE SERPL-MCNC: 97 MG/DL (ref 74–99)
HBA1C MFR BLD: 5.9 % (ref 4–5.6)
HCT VFR BLD AUTO: 41.4 % (ref 34–48)
HDLC SERPL-MCNC: 43 MG/DL
HGB BLD-MCNC: 13.5 G/DL (ref 11.5–15.5)
IMM GRANULOCYTES # BLD AUTO: <0.03 K/UL (ref 0–0.58)
IMM GRANULOCYTES NFR BLD: 0 % (ref 0–5)
LDLC SERPL CALC-MCNC: 108 MG/DL
LYMPHOCYTES NFR BLD: 2.07 K/UL (ref 1.5–4)
LYMPHOCYTES RELATIVE PERCENT: 29 % (ref 20–42)
MCH RBC QN AUTO: 32.5 PG (ref 26–35)
MCHC RBC AUTO-ENTMCNC: 32.6 G/DL (ref 32–34.5)
MCV RBC AUTO: 99.8 FL (ref 80–99.9)
MONOCYTES NFR BLD: 0.86 K/UL (ref 0.1–0.95)
MONOCYTES NFR BLD: 12 % (ref 2–12)
NEUTROPHILS NFR BLD: 55 % (ref 43–80)
NEUTS SEG NFR BLD: 3.96 K/UL (ref 1.8–7.3)
PLATELET # BLD AUTO: 320 K/UL (ref 130–450)
PMV BLD AUTO: 9.2 FL (ref 7–12)
POTASSIUM SERPL-SCNC: 4.6 MMOL/L (ref 3.5–5)
PROT SERPL-MCNC: 8.3 G/DL (ref 6.4–8.3)
RBC # BLD AUTO: 4.15 M/UL (ref 3.5–5.5)
SODIUM SERPL-SCNC: 140 MMOL/L (ref 132–146)
T4 FREE SERPL-MCNC: 1.3 NG/DL (ref 0.9–1.7)
TRIGL SERPL-MCNC: 106 MG/DL
TSH SERPL DL<=0.05 MIU/L-ACNC: 2.56 UIU/ML (ref 0.27–4.2)
VLDLC SERPL CALC-MCNC: 21 MG/DL
WBC OTHER # BLD: 7.2 K/UL (ref 4.5–11.5)

## 2025-02-26 PROCEDURE — 84439 ASSAY OF FREE THYROXINE: CPT

## 2025-02-26 PROCEDURE — 84443 ASSAY THYROID STIM HORMONE: CPT

## 2025-02-26 PROCEDURE — 80053 COMPREHEN METABOLIC PANEL: CPT

## 2025-02-26 PROCEDURE — 80061 LIPID PANEL: CPT

## 2025-02-26 PROCEDURE — 83036 HEMOGLOBIN GLYCOSYLATED A1C: CPT

## 2025-02-26 PROCEDURE — 36415 COLL VENOUS BLD VENIPUNCTURE: CPT

## 2025-02-26 PROCEDURE — 85025 COMPLETE CBC W/AUTO DIFF WBC: CPT

## 2025-04-03 ENCOUNTER — HOSPITAL ENCOUNTER (OUTPATIENT)
Age: 60
Discharge: HOME | End: 2025-04-03
Payer: COMMERCIAL

## 2025-04-03 LAB
ALBUMIN SERPL-MCNC: 4.2 G/DL (ref 3.5–5.2)
ALP SERPL-CCNC: 75 U/L (ref 35–104)
ALT SERPL-CCNC: 32 U/L (ref 0–32)
ANION GAP SERPL CALCULATED.3IONS-SCNC: 10 MMOL/L (ref 7–16)
AST SERPL-CCNC: 36 U/L (ref 0–31)
BASOPHILS # BLD: 0.03 K/UL (ref 0–0.2)
BASOPHILS NFR BLD: 1 % (ref 0–2)
BILIRUB SERPL-MCNC: 0.3 MG/DL (ref 0–1.2)
BUN SERPL-MCNC: 10 MG/DL (ref 6–20)
CALCIUM SERPL-MCNC: 9.6 MG/DL (ref 8.6–10.2)
CHLORIDE SERPL-SCNC: 102 MMOL/L (ref 98–107)
CO2 SERPL-SCNC: 28 MMOL/L (ref 22–29)
CREAT SERPL-MCNC: 0.9 MG/DL (ref 0.5–1)
EOSINOPHIL # BLD: 0.16 K/UL (ref 0.05–0.5)
EOSINOPHILS RELATIVE PERCENT: 3 % (ref 0–6)
ERYTHROCYTE [DISTWIDTH] IN BLOOD BY AUTOMATED COUNT: 14.7 % (ref 11.5–15)
GFR, ESTIMATED: 77 ML/MIN/1.73M2
GLUCOSE SERPL-MCNC: 91 MG/DL (ref 74–99)
HCT VFR BLD AUTO: 40.6 % (ref 34–48)
HGB BLD-MCNC: 12.9 G/DL (ref 11.5–15.5)
IMM GRANULOCYTES # BLD AUTO: <0.03 K/UL (ref 0–0.58)
IMM GRANULOCYTES NFR BLD: 0 % (ref 0–5)
LYMPHOCYTES NFR BLD: 1.85 K/UL (ref 1.5–4)
LYMPHOCYTES RELATIVE PERCENT: 37 % (ref 20–42)
MCH RBC QN AUTO: 32.1 PG (ref 26–35)
MCHC RBC AUTO-ENTMCNC: 31.8 G/DL (ref 32–34.5)
MCV RBC AUTO: 101 FL (ref 80–99.9)
MONOCYTES NFR BLD: 0.29 K/UL (ref 0.1–0.95)
MONOCYTES NFR BLD: 6 % (ref 2–12)
NEUTROPHILS NFR BLD: 53 % (ref 43–80)
NEUTS SEG NFR BLD: 2.68 K/UL (ref 1.8–7.3)
PLATELET # BLD AUTO: 252 K/UL (ref 130–450)
PMV BLD AUTO: 9.5 FL (ref 7–12)
POTASSIUM SERPL-SCNC: 4.4 MMOL/L (ref 3.5–5)
PROT SERPL-MCNC: 7.4 G/DL (ref 6.4–8.3)
RBC # BLD AUTO: 4.02 M/UL (ref 3.5–5.5)
SODIUM SERPL-SCNC: 140 MMOL/L (ref 132–146)
WBC OTHER # BLD: 5 K/UL (ref 4.5–11.5)

## 2025-04-03 PROCEDURE — 85025 COMPLETE CBC W/AUTO DIFF WBC: CPT

## 2025-04-03 PROCEDURE — 80053 COMPREHEN METABOLIC PANEL: CPT

## 2025-04-03 PROCEDURE — 36415 COLL VENOUS BLD VENIPUNCTURE: CPT

## 2025-05-06 ENCOUNTER — HOSPITAL ENCOUNTER (OUTPATIENT)
Age: 60
Discharge: HOME | End: 2025-05-06

## 2025-05-06 LAB
ALBUMIN SERPL-MCNC: 4.4 G/DL (ref 3.5–5.2)
ALP SERPL-CCNC: 81 U/L (ref 35–104)
ALT SERPL-CCNC: 29 U/L (ref 0–32)
ANION GAP SERPL CALCULATED.3IONS-SCNC: 12 MMOL/L (ref 7–16)
AST SERPL-CCNC: 28 U/L (ref 0–31)
BASOPHILS # BLD: 0.03 K/UL (ref 0–0.2)
BASOPHILS NFR BLD: 1 % (ref 0–2)
BILIRUB SERPL-MCNC: 0.4 MG/DL (ref 0–1.2)
BUN SERPL-MCNC: 13 MG/DL (ref 6–20)
CALCIUM SERPL-MCNC: 9.5 MG/DL (ref 8.6–10.2)
CHLORIDE SERPL-SCNC: 104 MMOL/L (ref 98–107)
CO2 SERPL-SCNC: 26 MMOL/L (ref 22–29)
CREAT SERPL-MCNC: 0.9 MG/DL (ref 0.5–1)
CRP SERPL HS-MCNC: 6.9 MG/L (ref 0–5)
EOSINOPHIL # BLD: 0.15 K/UL (ref 0.05–0.5)
EOSINOPHILS RELATIVE PERCENT: 2 % (ref 0–6)
ERYTHROCYTE [DISTWIDTH] IN BLOOD BY AUTOMATED COUNT: 14.1 % (ref 11.5–15)
ERYTHROCYTE [SEDIMENTATION RATE] IN BLOOD BY WESTERGREN METHOD: 20 MM/HR (ref 0–20)
GFR, ESTIMATED: 80 ML/MIN/1.73M2
GLUCOSE SERPL-MCNC: 122 MG/DL (ref 74–99)
HCT VFR BLD AUTO: 40.2 % (ref 34–48)
HGB BLD-MCNC: 12.9 G/DL (ref 11.5–15.5)
IMM GRANULOCYTES # BLD AUTO: <0.03 K/UL (ref 0–0.58)
IMM GRANULOCYTES NFR BLD: 0 % (ref 0–5)
LYMPHOCYTES NFR BLD: 2.09 K/UL (ref 1.5–4)
LYMPHOCYTES RELATIVE PERCENT: 32 % (ref 20–42)
MCH RBC QN AUTO: 32.5 PG (ref 26–35)
MCHC RBC AUTO-ENTMCNC: 32.1 G/DL (ref 32–34.5)
MCV RBC AUTO: 101.3 FL (ref 80–99.9)
MONOCYTES NFR BLD: 0.49 K/UL (ref 0.1–0.95)
MONOCYTES NFR BLD: 7 % (ref 2–12)
NEUTROPHILS NFR BLD: 58 % (ref 43–80)
NEUTS SEG NFR BLD: 3.8 K/UL (ref 1.8–7.3)
PLATELET # BLD AUTO: 262 K/UL (ref 130–450)
PMV BLD AUTO: 9.7 FL (ref 7–12)
POTASSIUM SERPL-SCNC: 4.5 MMOL/L (ref 3.5–5)
PROT SERPL-MCNC: 7.7 G/DL (ref 6.4–8.3)
RBC # BLD AUTO: 3.97 M/UL (ref 3.5–5.5)
SODIUM SERPL-SCNC: 142 MMOL/L (ref 132–146)
WBC OTHER # BLD: 6.6 K/UL (ref 4.5–11.5)

## 2025-05-06 PROCEDURE — 36415 COLL VENOUS BLD VENIPUNCTURE: CPT

## 2025-05-06 PROCEDURE — 85025 COMPLETE CBC W/AUTO DIFF WBC: CPT

## 2025-05-06 PROCEDURE — 86140 C-REACTIVE PROTEIN: CPT

## 2025-05-06 PROCEDURE — 85652 RBC SED RATE AUTOMATED: CPT

## 2025-05-06 PROCEDURE — 80053 COMPREHEN METABOLIC PANEL: CPT

## 2025-05-07 DIAGNOSIS — E11.65 TYPE 2 DIABETES MELLITUS WITH HYPERGLYCEMIA, WITHOUT LONG-TERM CURRENT USE OF INSULIN (HCC): ICD-10-CM

## 2025-05-07 DIAGNOSIS — N18.31 STAGE 3A CHRONIC KIDNEY DISEASE (HCC): Primary | ICD-10-CM

## 2025-05-07 DIAGNOSIS — E66.01 MORBID (SEVERE) OBESITY DUE TO EXCESS CALORIES (HCC): ICD-10-CM

## 2025-05-07 DIAGNOSIS — I10 PRIMARY HYPERTENSION: ICD-10-CM

## 2025-05-07 DIAGNOSIS — E78.2 MIXED HYPERLIPIDEMIA: ICD-10-CM

## 2025-05-27 ENCOUNTER — HOSPITAL ENCOUNTER (OUTPATIENT)
Dept: MAMMOGRAPHY | Age: 60
Discharge: HOME | End: 2025-05-29
Payer: COMMERCIAL

## 2025-05-27 VITALS — BODY MASS INDEX: 42.03 KG/M2 | WEIGHT: 245 LBS

## 2025-05-27 DIAGNOSIS — Z12.31 ENCOUNTER FOR SCREENING MAMMOGRAM FOR MALIGNANT NEOPLASM OF BREAST: ICD-10-CM

## 2025-05-27 LAB
ALBUMIN: 4.1 G/DL (ref 3.5–5.2)
ALP BLD-CCNC: 68 U/L (ref 35–104)
ALT SERPL-CCNC: 32 U/L (ref 0–35)
ANION GAP SERPL CALCULATED.3IONS-SCNC: 9 MMOL/L (ref 7–16)
AST SERPL-CCNC: 30 U/L (ref 0–35)
BASOPHILS ABSOLUTE: 0.03 K/UL (ref 0–0.2)
BASOPHILS RELATIVE PERCENT: 1 % (ref 0–2)
BILIRUB SERPL-MCNC: 0.2 MG/DL (ref 0–1.2)
BUN BLDV-MCNC: 11 MG/DL (ref 6–20)
CALCIUM SERPL-MCNC: 9.2 MG/DL (ref 8.6–10)
CHLORIDE BLD-SCNC: 106 MMOL/L (ref 98–107)
CHOLESTEROL, TOTAL: 160 MG/DL
CO2: 27 MMOL/L (ref 22–29)
CREAT SERPL-MCNC: 0.7 MG/DL (ref 0.5–1)
EOSINOPHILS ABSOLUTE: 0.15 K/UL (ref 0.05–0.5)
EOSINOPHILS RELATIVE PERCENT: 4 % (ref 0–6)
GFR, ESTIMATED: >90 ML/MIN/1.73M2
GLUCOSE BLD-MCNC: 96 MG/DL (ref 74–99)
HBA1C MFR BLD: 6 % (ref 4–5.6)
HCT VFR BLD CALC: 39.4 % (ref 34–48)
HDLC SERPL-MCNC: 49 MG/DL
HEMOGLOBIN: 12.3 G/DL (ref 11.5–15.5)
IMMATURE GRANULOCYTES %: 0 % (ref 0–5)
IMMATURE GRANULOCYTES ABSOLUTE: <0.03 K/UL (ref 0–0.58)
LDL CHOLESTEROL: 102 MG/DL
LYMPHOCYTES ABSOLUTE: 1.5 K/UL (ref 1.5–4)
LYMPHOCYTES RELATIVE PERCENT: 35 % (ref 20–42)
MCH RBC QN AUTO: 32.6 PG (ref 26–35)
MCHC RBC AUTO-ENTMCNC: 31.2 G/DL (ref 32–34.5)
MCV RBC AUTO: 104.5 FL (ref 80–99.9)
MONOCYTES ABSOLUTE: 0.43 K/UL (ref 0.1–0.95)
MONOCYTES RELATIVE PERCENT: 10 % (ref 2–12)
NEUTROPHILS ABSOLUTE: 2.17 K/UL (ref 1.8–7.3)
NEUTROPHILS RELATIVE PERCENT: 51 % (ref 43–80)
PDW BLD-RTO: 14.2 % (ref 11.5–15)
PLATELET # BLD: 249 K/UL (ref 130–450)
PMV BLD AUTO: 10.6 FL (ref 7–12)
POTASSIUM SERPL-SCNC: 4.7 MMOL/L (ref 3.5–5.1)
RBC # BLD: 3.77 M/UL (ref 3.5–5.5)
SODIUM BLD-SCNC: 142 MMOL/L (ref 136–145)
T4 FREE: 0.8 NG/DL (ref 0.9–1.7)
TOTAL PROTEIN: 7.3 G/DL (ref 6.4–8.3)
TRIGL SERPL-MCNC: 47 MG/DL
TSH SERPL DL<=0.05 MIU/L-ACNC: 1.56 UIU/ML (ref 0.27–4.2)
VLDLC SERPL CALC-MCNC: 9 MG/DL
WBC # BLD: 4.3 K/UL (ref 4.5–11.5)

## 2025-05-27 PROCEDURE — 77063 BREAST TOMOSYNTHESIS BI: CPT

## 2025-05-29 ENCOUNTER — OFFICE VISIT (OUTPATIENT)
Dept: PRIMARY CARE CLINIC | Age: 60
End: 2025-05-29
Payer: COMMERCIAL

## 2025-05-29 VITALS
BODY MASS INDEX: 40.97 KG/M2 | SYSTOLIC BLOOD PRESSURE: 124 MMHG | WEIGHT: 240 LBS | HEART RATE: 69 BPM | RESPIRATION RATE: 18 BRPM | TEMPERATURE: 97.3 F | OXYGEN SATURATION: 96 % | DIASTOLIC BLOOD PRESSURE: 78 MMHG | HEIGHT: 64 IN

## 2025-05-29 DIAGNOSIS — I10 PRIMARY HYPERTENSION: ICD-10-CM

## 2025-05-29 DIAGNOSIS — E78.2 MIXED HYPERLIPIDEMIA: ICD-10-CM

## 2025-05-29 DIAGNOSIS — Z00.00 PHYSICAL EXAM, ANNUAL: ICD-10-CM

## 2025-05-29 DIAGNOSIS — E03.9 ACQUIRED HYPOTHYROIDISM: Primary | ICD-10-CM

## 2025-05-29 DIAGNOSIS — I01.1 RHEUMATOID AORTITIS: ICD-10-CM

## 2025-05-29 DIAGNOSIS — E11.65 TYPE 2 DIABETES MELLITUS WITH HYPERGLYCEMIA, WITHOUT LONG-TERM CURRENT USE OF INSULIN (HCC): ICD-10-CM

## 2025-05-29 DIAGNOSIS — F32.9 REACTIVE DEPRESSION: ICD-10-CM

## 2025-05-29 DIAGNOSIS — H53.8 BLURRY VISION, BILATERAL: ICD-10-CM

## 2025-05-29 PROCEDURE — 3074F SYST BP LT 130 MM HG: CPT | Performed by: INTERNAL MEDICINE

## 2025-05-29 PROCEDURE — 99396 PREV VISIT EST AGE 40-64: CPT | Performed by: INTERNAL MEDICINE

## 2025-05-29 PROCEDURE — 3078F DIAST BP <80 MM HG: CPT | Performed by: INTERNAL MEDICINE

## 2025-05-29 RX ORDER — CITALOPRAM HYDROBROMIDE 40 MG/1
40 TABLET ORAL DAILY
Qty: 90 TABLET | Refills: 0 | Status: SHIPPED | OUTPATIENT
Start: 2025-05-29 | End: 2025-08-27

## 2025-05-29 RX ORDER — ATENOLOL 50 MG/1
50 TABLET ORAL
Qty: 90 TABLET | Refills: 0 | Status: SHIPPED | OUTPATIENT
Start: 2025-05-29

## 2025-05-29 ASSESSMENT — ENCOUNTER SYMPTOMS
ABDOMINAL DISTENTION: 0
DIARRHEA: 0
CONSTIPATION: 0
PHOTOPHOBIA: 0
NAUSEA: 0
SHORTNESS OF BREATH: 0
BACK PAIN: 0
BLOOD IN STOOL: 0
WHEEZING: 0
SINUS PAIN: 0
TROUBLE SWALLOWING: 0
ABDOMINAL PAIN: 0
EYE ITCHING: 0
APNEA: 0
SORE THROAT: 0
VOMITING: 0
COUGH: 0
EYE DISCHARGE: 0

## 2025-05-29 NOTE — PROGRESS NOTES
Yelitza Paz (:  1965) is a 59 y.o. female,Established patient, here for evaluation of the following chief complaint(s):  Annual Exam (Patient is here for yearly Physical-patient has lab results. Patient states she has some brain fog, and possible RA or lupus and is taking new medication. )      Subjective   SUBJECTIVE/OBJECTIVE:  HPI:  Annual exam    Review of Systems   Constitutional:  Negative for activity change, appetite change, fever and unexpected weight change.   HENT:  Negative for congestion, ear pain, hearing loss, sinus pain, sore throat, tinnitus and trouble swallowing.    Eyes:  Negative for photophobia, discharge, itching and visual disturbance.   Respiratory:  Negative for apnea, cough, shortness of breath and wheezing.    Cardiovascular:  Negative for chest pain, palpitations and leg swelling.   Gastrointestinal:  Negative for abdominal distention, abdominal pain, blood in stool, constipation, diarrhea, nausea and vomiting.   Endocrine: Negative for cold intolerance, polydipsia and polyuria.   Genitourinary:  Negative for difficulty urinating, dysuria, frequency and pelvic pain.   Musculoskeletal:  Negative for arthralgias, back pain, joint swelling, myalgias, neck pain and neck stiffness.   Skin:  Negative for rash and wound.   Neurological:  Negative for dizziness, tremors, syncope, light-headedness and headaches.     CBC with Differential:    Lab Results   Component Value Date/Time    WBC 4.3 2025 08:04 AM    RBC 3.77 2025 08:04 AM    HGB 12.3 2025 08:04 AM    HCT 39.4 2025 08:04 AM     2025 08:04 AM    .5 2025 08:04 AM    MCH 32.6 2025 08:04 AM    MCHC 31.2 2025 08:04 AM    RDW 14.2 2025 08:04 AM    LYMPHOPCT 35 2025 08:04 AM    MONOPCT 10 2025 08:04 AM    EOSPCT 4 2025 08:04 AM    BASOPCT 1 2025 08:04 AM    MONOSABS 0.43 2025 08:04 AM    LYMPHSABS 1.50 2025 08:04 AM    EOSABS

## 2025-06-26 RX ORDER — METHOTREXATE 2.5 MG/1
TABLET ORAL
Qty: 96 TABLET | Refills: 1 | OUTPATIENT
Start: 2025-06-26

## 2025-08-24 DIAGNOSIS — F32.9 REACTIVE DEPRESSION: ICD-10-CM

## 2025-08-25 RX ORDER — CITALOPRAM HYDROBROMIDE 40 MG/1
40 TABLET ORAL DAILY
Qty: 90 TABLET | Refills: 0 | Status: SHIPPED | OUTPATIENT
Start: 2025-08-25

## 2025-09-02 ENCOUNTER — OFFICE VISIT (OUTPATIENT)
Dept: PRIMARY CARE CLINIC | Age: 60
End: 2025-09-02
Payer: COMMERCIAL

## 2025-09-02 VITALS
HEIGHT: 64 IN | OXYGEN SATURATION: 97 % | SYSTOLIC BLOOD PRESSURE: 130 MMHG | WEIGHT: 233 LBS | DIASTOLIC BLOOD PRESSURE: 80 MMHG | TEMPERATURE: 97.3 F | HEART RATE: 76 BPM | BODY MASS INDEX: 39.78 KG/M2

## 2025-09-02 DIAGNOSIS — H10.33 ACUTE BACTERIAL CONJUNCTIVITIS OF BOTH EYES: Primary | ICD-10-CM

## 2025-09-02 DIAGNOSIS — H66.003 NON-RECURRENT ACUTE SUPPURATIVE OTITIS MEDIA OF BOTH EARS WITHOUT SPONTANEOUS RUPTURE OF TYMPANIC MEMBRANES: ICD-10-CM

## 2025-09-02 PROCEDURE — 3079F DIAST BP 80-89 MM HG: CPT | Performed by: INTERNAL MEDICINE

## 2025-09-02 PROCEDURE — 99213 OFFICE O/P EST LOW 20 MIN: CPT | Performed by: INTERNAL MEDICINE

## 2025-09-02 PROCEDURE — 3075F SYST BP GE 130 - 139MM HG: CPT | Performed by: INTERNAL MEDICINE

## 2025-09-02 RX ORDER — ERYTHROMYCIN 5 MG/G
OINTMENT OPHTHALMIC
Qty: 3.5 G | Refills: 0 | Status: SHIPPED | OUTPATIENT
Start: 2025-09-02 | End: 2025-09-12

## 2025-09-02 RX ORDER — CIPROFLOXACIN HYDROCHLORIDE 3.5 MG/ML
1 SOLUTION/ DROPS TOPICAL
Qty: 5 ML | Refills: 0 | Status: SHIPPED | OUTPATIENT
Start: 2025-09-02 | End: 2025-09-12

## 2025-09-02 RX ORDER — AZITHROMYCIN 250 MG/1
TABLET, FILM COATED ORAL
Qty: 6 TABLET | Refills: 0 | Status: SHIPPED | OUTPATIENT
Start: 2025-09-02 | End: 2025-09-12

## 2025-09-02 ASSESSMENT — ENCOUNTER SYMPTOMS
NAUSEA: 0
BACK PAIN: 0
ABDOMINAL DISTENTION: 0
EYE REDNESS: 1
BLOOD IN STOOL: 0
SORE THROAT: 0
EYE ITCHING: 1
PHOTOPHOBIA: 0
ABDOMINAL PAIN: 0
EYE DISCHARGE: 0
WHEEZING: 0
CONSTIPATION: 0
DIARRHEA: 0
TROUBLE SWALLOWING: 0
SHORTNESS OF BREATH: 0
VOMITING: 0
SINUS PAIN: 0
COUGH: 0
APNEA: 0

## (undated) DEVICE — APPLICATOR MEDICATED 26 CC SOLUTION HI LT ORNG CHLORAPREP

## (undated) DEVICE — TOWEL OR BLUEE 16X26IN ST 8 PACK ORB08 16X26ORTWL

## (undated) DEVICE — NDL CNTR 40CT FM MAG: Brand: MEDLINE INDUSTRIES, INC.

## (undated) DEVICE — PACK,EXTREMITY,ORTHOMAX,5/CS: Brand: MEDLINE

## (undated) DEVICE — BLADE SAW SAG NAR THCK LNG 12.5MM

## (undated) DEVICE — MARKER,SKIN,WI/RULER AND LABELS: Brand: MEDLINE

## (undated) DEVICE — CANNULA IV 18GA L15IN BLNT FILL LUERLOCK HUB MJCT

## (undated) DEVICE — GLOVE ORANGE PI 8   MSG9080

## (undated) DEVICE — NEEDLE HYPO 18GA L1.5IN PNK POLYPR HUB S STL REG BVL STR

## (undated) DEVICE — SUTURE SUTTAPE L40IN DIA1.3MM NONABSORBABLE WHT BLU L26.5MM AR7500

## (undated) DEVICE — 3M™ STERI-DRAPE™ U-DRAPE 1015: Brand: STERI-DRAPE™

## (undated) DEVICE — TOWEL,OR,DSP,ST,BLUE,STD,6/PK,12PK/CS: Brand: MEDLINE

## (undated) DEVICE — GOWN SURG XL SMS FAB NONREINFORCED RAGLAN SLV HK LOOP CLSR

## (undated) DEVICE — GLOVE ORANGE PI 7   MSG9070

## (undated) DEVICE — CVD CANNULA

## (undated) DEVICE — HANDPIECE SET WITH BONE CLEANING TIP: Brand: INTERPULSE

## (undated) DEVICE — 3 ML SYRINGE LUER-LOCK TIP: Brand: MONOJECT

## (undated) DEVICE — SHEET SUPPORT

## (undated) DEVICE — GARMENT,MEDLINE,DVT,INT,CALF,MED, GEN2: Brand: MEDLINE

## (undated) DEVICE — CHLORAPREP 26ML ORANGE

## (undated) DEVICE — NON-DEHP CATHETER EXTENSION SET, MALE LUER LOCK ADAPTER

## (undated) DEVICE — NEEDLE EPI 18GA L3.5IN S STL MOD TUOHY PNT THN WALL W/O WNG

## (undated) DEVICE — GEL US 20GM NONIRRITATING OVERWRAPPED FILE PCH TRNSMIT

## (undated) DEVICE — COVER,TABLE,60X90,STERILE: Brand: MEDLINE

## (undated) DEVICE — SYRINGE IRRIG 60ML SFT PLIABLE BLB EZ TO GRP 1 HND USE W/

## (undated) DEVICE — SOLUTION IV IRRIG 500ML 0.9% SODIUM CHL 2F7123

## (undated) DEVICE — ENCORE® LATEX TEXTURED SIZE 6.5, STERILE LATEX POWDER-FREE SURGICAL GLOVE: Brand: ENCORE

## (undated) DEVICE — 6 ML SYRINGE LUER-LOCK TIP: Brand: MONOJECT

## (undated) DEVICE — GAUZE,SPONGE,4"X4",12PLY,STERILE,LF,2'S: Brand: MEDLINE

## (undated) DEVICE — Z DISCONTINUED USE 2516375 APPLICATOR MEDICATED 3 CC CLR STRL CHLORAPREP

## (undated) DEVICE — SPONGE LAP W18XL18IN WHT COT 4 PLY FLD STRUNG RADPQ DISP ST

## (undated) DEVICE — GAUZE,SPONGE,4"X4",16PLY,XRAY,STRL,LF: Brand: MEDLINE

## (undated) DEVICE — BIT DRL L145MM DIA3.2MM ST QUIK CPL W/O STP REUSE

## (undated) DEVICE — TIBURON EXTREMITY SHEET: Brand: CONVERTORS

## (undated) DEVICE — NEEDLE HYPO 18GA L1.5IN PNK POLYPR HUB S STL THN WALL FILL

## (undated) DEVICE — 3M™ RED DOT™ MONITORING ELECTRODE WITH FOAM TAPE AND STICKY GEL 2560, 50/BAG, 20/CASE, 72/PLT: Brand: RED DOT™

## (undated) DEVICE — Z DISCONTINUED USE 2275686 GLOVE SURG SZ 8 L12IN FNGR THK13MIL WHT ISOLEX POLYISOPRENE

## (undated) DEVICE — ELECTRODE PT RET AD L9FT HI MOIST COND ADH HYDRGEL CORDED

## (undated) DEVICE — BANDAGE ADH W1XL3IN NAT FAB WVN FLX DURABLE N ADH PD SEAL

## (undated) DEVICE — 1810 FOAM BLOCK NEEDLE COUNTER: Brand: DEVON

## (undated) DEVICE — SYRINGE MED 50ML LUERLOCK TIP

## (undated) DEVICE — BANDAGE,GAUZE,BULKEE II,4.5"X4.1YD,STRL: Brand: MEDLINE

## (undated) DEVICE — COVER,LIGHT HANDLE,FLX,1/PK: Brand: MEDLINE INDUSTRIES, INC.

## (undated) DEVICE — SET MAJOR INSTR ORTHO

## (undated) DEVICE — SOLUTION IRRIG 1000ML 09% SOD CHL USP PIC PLAS CONTAINER

## (undated) DEVICE — NEEDLE FLTR 18GA L1.5IN MEM THK5UM BLNT DISP

## (undated) DEVICE — SOLUTION IV 500ML 0.9% SOD CHL PH 5 INJ USP VIAFLX PLAS

## (undated) DEVICE — BANDAGE COMPR L W4INXL11YD 100% COT WVN E DBL LEN CLP CLSR

## (undated) DEVICE — GUIDEWIRE ORTH L230MM DIA2.5MM S STL SPADE PNT TIP

## (undated) DEVICE — NEEDLE SPNL 22GA L3.5IN BLK HUB S STL REG WALL FIT STYL

## (undated) DEVICE — NEEDLE HYPO 25GA L1.5IN BLU POLYPR HUB S STL REG BVL STR

## (undated) DEVICE — SHEET DRAPE FULL 70X100

## (undated) DEVICE — DRAPE SHEET, X-LARGE: Brand: CONVERTORS

## (undated) DEVICE — 20 ML SYRINGE REGULAR TIP: Brand: MONOJECT

## (undated) DEVICE — PENCIL ES L3M BTTN SWCH HOLSTER W/ BLDE ELECTRD EDGE

## (undated) DEVICE — STANDARD HYPODERMIC NEEDLE,POLYPROPYLENE HUB: Brand: MONOJECT

## (undated) DEVICE — PITCHER PT 1200ML W HNDL CSR WRP

## (undated) DEVICE — 4-PORT MANIFOLD: Brand: NEPTUNE 2

## (undated) DEVICE — T4 HOOD

## (undated) DEVICE — HOOK LOCK LATEX FREE ELASTIC BANDAGE 3INX5YD

## (undated) DEVICE — SYRINGE MED 10ML POLYPR LUERSLIP TIP FLAT TOP W/O SFTY DISP

## (undated) DEVICE — SOLUTION SCRB 32OZ 7.5% POVIDONE IOD BTL GENTLE EFFECTIVE

## (undated) DEVICE — DRAPE,REIN 53X77,STERILE: Brand: MEDLINE

## (undated) DEVICE — INTRODUCER SHTH 7FR L11CM CLSR FAST SET

## (undated) DEVICE — CATHETER ABLAT 7FR L60CM HEAT ELEMENT L7CM 0.025IN

## (undated) DEVICE — 18 GA N.G. KIT, 10 PACK: Brand: SITE-RITE

## (undated) DEVICE — PEN: MARKING STD 100/CS: Brand: MEDICAL ACTION INDUSTRIES

## (undated) DEVICE — GOWN,SIRUS,POLYRNF,BRTHSLV,XLN/XL,20/CS: Brand: MEDLINE

## (undated) DEVICE — GAUZE,SPONGE,4"X4",16PLY,STRL,LF,10/TRAY: Brand: MEDLINE

## (undated) DEVICE — GLOVE SURG 7.5 LTX TRIFLEX WIDE FINGER PWDR

## (undated) DEVICE — NEEDLE SPNL 22GA L5IN BLK HUB S STL W/ QNCKE PNT W/OUT

## (undated) DEVICE — ELECTRODE SURG MPLR NEUT SELF ADH PT PLT MULTIGEN

## (undated) DEVICE — INTENDED FOR TISSUE SEPARATION, AND OTHER PROCEDURES THAT REQUIRE A SHARP SURGICAL BLADE TO PUNCTURE OR CUT.: Brand: BARD-PARKER ® STAINLESS STEEL BLADES

## (undated) DEVICE — BASIC PACK: Brand: CONVERTORS

## (undated) DEVICE — SOLUTION IV 1000ML 0.9% SOD CHL PH 5 INJ USP VIAFLX PLAS

## (undated) DEVICE — Z DISCONTINUED APPLICATOR SURG PREP 0.35OZ 2% CHG 70% ISO ALC W/ HI LT

## (undated) DEVICE — DRESSING GZ XRFRM 4X4(25/BX 6BX/CS)

## (undated) DEVICE — BLADE,STAINLESS-STEEL,15,STRL,DISPOSABLE: Brand: MEDLINE

## (undated) DEVICE — DOUBLE BASIN SET: Brand: MEDLINE INDUSTRIES, INC.

## (undated) DEVICE — 3M™ IOBAN™ 2 ANTIMICROBIAL INCISE DRAPE 6650EZ: Brand: IOBAN™ 2

## (undated) DEVICE — NEEDLE SPNL WEISS LNG 18 GAX5 IN MOD TUOHY PT TW PERISAFE

## (undated) DEVICE — TUBING INFLTR PMP TUMESCENT

## (undated) DEVICE — Z DISCONTINUED USE 2132709 NEEDLE HYPO 18GA L1.5IN PNK POLYPR HUB S STL THN WALL FILL

## (undated) DEVICE — Device

## (undated) DEVICE — MEDI-VAC YANKAUER SUCTION HANDLE: Brand: CARDINAL HEALTH

## (undated) DEVICE — SWABSTCK, BENZOIN TINCTURE, 1/PK, STRL: Brand: APLICARE

## (undated) DEVICE — Z CONVERTED USE 2275207 CLOTH PREP W7.5XL7.5IN 2% CHG SKIN ALC AND RNS FREE

## (undated) DEVICE — BANDAGE COMPR W6INXL12FT SMOOTH FOR LIMB EXSANG ESMARCH

## (undated) DEVICE — STRIP,CLOSURE,WOUND,MEDI-STRIP,1/2X4: Brand: MEDLINE

## (undated) DEVICE — 1 ML TUBERCULIN SYRINGE,DETACHABLE NEEDLE: Brand: MONOJECT

## (undated) DEVICE — GUIDEWIRE URO L150CM DIA0.035IN TAPR 3CM NIT HYDRPHLC ANG

## (undated) DEVICE — 3M™ IOBAN™ 2 ANTIMICROBIAL INCISE DRAPE 6640EZ: Brand: IOBAN™ 2

## (undated) DEVICE — SYRINGE, LUER LOCK, 5ML: Brand: MEDLINE

## (undated) DEVICE — GOWN,SIRUS,FABRNF,L,20/CS: Brand: MEDLINE

## (undated) DEVICE — DECANTER: Brand: UNBRANDED

## (undated) DEVICE — Z DISCONTINUED USE 2272124 DRAPE SURG XL N INVASIVE 2 LAYR DISP

## (undated) DEVICE — SOLUTION IV IRRIG POUR BRL 0.9% SODIUM CHL 2F7124

## (undated) DEVICE — BLADE,STAINLESS-STEEL,11,STRL,DISPOSABLE: Brand: MEDLINE

## (undated) DEVICE — BANDAGE COMPR W6INXL5YD SELF ADH COHESIVE CO FLX

## (undated) DEVICE — PACK,UNIV, II AURORA: Brand: MEDLINE

## (undated) DEVICE — SPONGE LAP W18XL18IN WHT COT 4 PLY FLD STRUNG RADPQ DISP ST 2 PER PACK

## (undated) DEVICE — HANDLE CVR PATENTED RETENTION DISC STRL LIGHT SHLD

## (undated) DEVICE — COVER HNDL LT DISP

## (undated) DEVICE — TUBING, SUCTION, 1/4" X 10', STRAIGHT: Brand: MEDLINE

## (undated) DEVICE — SUTURE NONABSORBABLE MONOFILAMENT 4-0 PS-2 18 IN BLU PROLENE 8682H

## (undated) DEVICE — NEEDLE SPNL L3.5IN PNK HUB S STL REG WALL FIT STYL W/ QNCKE

## (undated) DEVICE — GOWN,SIRUS,FABRNF,XL,20/CS: Brand: MEDLINE

## (undated) DEVICE — CONVERTORS STOCKINETTE: Brand: CONVERTORS

## (undated) DEVICE — GAUZE,SPONGE,4"X4",8PLY,STRL,LF,10/TRAY: Brand: MEDLINE

## (undated) DEVICE — PACK PROCEDURE SURG GEN CUST